# Patient Record
Sex: MALE | Race: BLACK OR AFRICAN AMERICAN | NOT HISPANIC OR LATINO | Employment: UNEMPLOYED | URBAN - METROPOLITAN AREA
[De-identification: names, ages, dates, MRNs, and addresses within clinical notes are randomized per-mention and may not be internally consistent; named-entity substitution may affect disease eponyms.]

---

## 2022-01-21 ENCOUNTER — HOSPITAL ENCOUNTER (INPATIENT)
Facility: CLINIC | Age: 32
LOS: 1 days | Discharge: HOME | End: 2022-01-23
Attending: EMERGENCY MEDICINE | Admitting: INTERNAL MEDICINE
Payer: COMMERCIAL

## 2022-01-21 DIAGNOSIS — U07.1 LAB TEST POSITIVE FOR DETECTION OF COVID-19 VIRUS: ICD-10-CM

## 2022-01-21 DIAGNOSIS — I50.20 HEART FAILURE WITH REDUCED EJECTION FRACTION, NYHA CLASS I (H): ICD-10-CM

## 2022-01-21 LAB
ALBUMIN SERPL-MCNC: 2.6 G/DL (ref 3.4–5)
ALP SERPL-CCNC: 119 U/L (ref 40–150)
ALT SERPL W P-5'-P-CCNC: 60 U/L (ref 0–70)
ANION GAP SERPL CALCULATED.3IONS-SCNC: 8 MMOL/L (ref 3–14)
AST SERPL W P-5'-P-CCNC: 35 U/L (ref 0–45)
BASE EXCESS BLDV CALC-SCNC: 4.9 MMOL/L (ref -7.7–1.9)
BASOPHILS # BLD AUTO: 0.1 10E3/UL (ref 0–0.2)
BASOPHILS NFR BLD AUTO: 1 %
BILIRUB SERPL-MCNC: 0.9 MG/DL (ref 0.2–1.3)
BUN SERPL-MCNC: 23 MG/DL (ref 7–30)
CALCIUM SERPL-MCNC: 8.6 MG/DL (ref 8.5–10.1)
CHLORIDE BLD-SCNC: 103 MMOL/L (ref 94–109)
CO2 SERPL-SCNC: 29 MMOL/L (ref 20–32)
CREAT SERPL-MCNC: 1.67 MG/DL (ref 0.66–1.25)
EOSINOPHIL # BLD AUTO: 0 10E3/UL (ref 0–0.7)
EOSINOPHIL NFR BLD AUTO: 0 %
ERYTHROCYTE [DISTWIDTH] IN BLOOD BY AUTOMATED COUNT: 15.8 % (ref 10–15)
GFR SERPL CREATININE-BSD FRML MDRD: 56 ML/MIN/1.73M2
GLUCOSE BLD-MCNC: 109 MG/DL (ref 70–99)
HCO3 BLDV-SCNC: 30 MMOL/L (ref 21–28)
HCT VFR BLD AUTO: 39.6 % (ref 40–53)
HGB BLD-MCNC: 11.8 G/DL (ref 13.3–17.7)
IMM GRANULOCYTES # BLD: 0 10E3/UL
IMM GRANULOCYTES NFR BLD: 0 %
LACTATE SERPL-SCNC: 1.5 MMOL/L (ref 0.7–2)
LYMPHOCYTES # BLD AUTO: 2.8 10E3/UL (ref 0.8–5.3)
LYMPHOCYTES NFR BLD AUTO: 35 %
MCH RBC QN AUTO: 22.4 PG (ref 26.5–33)
MCHC RBC AUTO-ENTMCNC: 29.8 G/DL (ref 31.5–36.5)
MCV RBC AUTO: 75 FL (ref 78–100)
MONOCYTES # BLD AUTO: 1 10E3/UL (ref 0–1.3)
MONOCYTES NFR BLD AUTO: 13 %
NEUTROPHILS # BLD AUTO: 4 10E3/UL (ref 1.6–8.3)
NEUTROPHILS NFR BLD AUTO: 51 %
NRBC # BLD AUTO: 0 10E3/UL
NRBC BLD AUTO-RTO: 1 /100
NT-PROBNP SERPL-MCNC: 3984 PG/ML (ref 0–450)
O2/TOTAL GAS SETTING VFR VENT: 96 %
PCO2 BLDV: 45 MM HG (ref 40–50)
PH BLDV: 7.43 [PH] (ref 7.32–7.43)
PLATELET # BLD AUTO: 300 10E3/UL (ref 150–450)
PO2 BLDV: 60 MM HG (ref 25–47)
POTASSIUM BLD-SCNC: 3.4 MMOL/L (ref 3.4–5.3)
PROT SERPL-MCNC: 6.8 G/DL (ref 6.8–8.8)
RBC # BLD AUTO: 5.26 10E6/UL (ref 4.4–5.9)
SODIUM SERPL-SCNC: 140 MMOL/L (ref 133–144)
TROPONIN I SERPL HS-MCNC: 141 NG/L
TSH SERPL DL<=0.005 MIU/L-ACNC: 1.71 MU/L (ref 0.4–4)
WBC # BLD AUTO: 7.9 10E3/UL (ref 4–11)

## 2022-01-21 PROCEDURE — 83880 ASSAY OF NATRIURETIC PEPTIDE: CPT | Performed by: STUDENT IN AN ORGANIZED HEALTH CARE EDUCATION/TRAINING PROGRAM

## 2022-01-21 PROCEDURE — C9803 HOPD COVID-19 SPEC COLLECT: HCPCS

## 2022-01-21 PROCEDURE — 85025 COMPLETE CBC W/AUTO DIFF WBC: CPT | Performed by: STUDENT IN AN ORGANIZED HEALTH CARE EDUCATION/TRAINING PROGRAM

## 2022-01-21 PROCEDURE — 96376 TX/PRO/DX INJ SAME DRUG ADON: CPT

## 2022-01-21 PROCEDURE — 99292 CRITICAL CARE ADDL 30 MIN: CPT

## 2022-01-21 PROCEDURE — 99291 CRITICAL CARE FIRST HOUR: CPT | Mod: 25 | Performed by: STUDENT IN AN ORGANIZED HEALTH CARE EDUCATION/TRAINING PROGRAM

## 2022-01-21 PROCEDURE — 87636 SARSCOV2 & INF A&B AMP PRB: CPT | Performed by: STUDENT IN AN ORGANIZED HEALTH CARE EDUCATION/TRAINING PROGRAM

## 2022-01-21 PROCEDURE — 258N000003 HC RX IP 258 OP 636: Performed by: STUDENT IN AN ORGANIZED HEALTH CARE EDUCATION/TRAINING PROGRAM

## 2022-01-21 PROCEDURE — 82803 BLOOD GASES ANY COMBINATION: CPT | Performed by: STUDENT IN AN ORGANIZED HEALTH CARE EDUCATION/TRAINING PROGRAM

## 2022-01-21 PROCEDURE — 84484 ASSAY OF TROPONIN QUANT: CPT | Performed by: STUDENT IN AN ORGANIZED HEALTH CARE EDUCATION/TRAINING PROGRAM

## 2022-01-21 PROCEDURE — 96366 THER/PROPH/DIAG IV INF ADDON: CPT

## 2022-01-21 PROCEDURE — 36415 COLL VENOUS BLD VENIPUNCTURE: CPT | Performed by: STUDENT IN AN ORGANIZED HEALTH CARE EDUCATION/TRAINING PROGRAM

## 2022-01-21 PROCEDURE — 93010 ELECTROCARDIOGRAM REPORT: CPT | Performed by: STUDENT IN AN ORGANIZED HEALTH CARE EDUCATION/TRAINING PROGRAM

## 2022-01-21 PROCEDURE — 250N000011 HC RX IP 250 OP 636: Performed by: STUDENT IN AN ORGANIZED HEALTH CARE EDUCATION/TRAINING PROGRAM

## 2022-01-21 PROCEDURE — 250N000013 HC RX MED GY IP 250 OP 250 PS 637: Performed by: STUDENT IN AN ORGANIZED HEALTH CARE EDUCATION/TRAINING PROGRAM

## 2022-01-21 PROCEDURE — 96368 THER/DIAG CONCURRENT INF: CPT

## 2022-01-21 PROCEDURE — 93005 ELECTROCARDIOGRAM TRACING: CPT

## 2022-01-21 PROCEDURE — 83605 ASSAY OF LACTIC ACID: CPT | Performed by: STUDENT IN AN ORGANIZED HEALTH CARE EDUCATION/TRAINING PROGRAM

## 2022-01-21 PROCEDURE — 96375 TX/PRO/DX INJ NEW DRUG ADDON: CPT

## 2022-01-21 PROCEDURE — 96365 THER/PROPH/DIAG IV INF INIT: CPT

## 2022-01-21 PROCEDURE — 250N000009 HC RX 250: Performed by: STUDENT IN AN ORGANIZED HEALTH CARE EDUCATION/TRAINING PROGRAM

## 2022-01-21 PROCEDURE — 99291 CRITICAL CARE FIRST HOUR: CPT | Mod: 25

## 2022-01-21 PROCEDURE — 80053 COMPREHEN METABOLIC PANEL: CPT | Performed by: STUDENT IN AN ORGANIZED HEALTH CARE EDUCATION/TRAINING PROGRAM

## 2022-01-21 PROCEDURE — 99292 CRITICAL CARE ADDL 30 MIN: CPT | Mod: 25 | Performed by: STUDENT IN AN ORGANIZED HEALTH CARE EDUCATION/TRAINING PROGRAM

## 2022-01-21 PROCEDURE — 84443 ASSAY THYROID STIM HORMONE: CPT | Performed by: STUDENT IN AN ORGANIZED HEALTH CARE EDUCATION/TRAINING PROGRAM

## 2022-01-21 PROCEDURE — 250N000011 HC RX IP 250 OP 636

## 2022-01-21 RX ORDER — ADENOSINE 3 MG/ML
6 INJECTION, SOLUTION INTRAVENOUS ONCE
Status: COMPLETED | OUTPATIENT
Start: 2022-01-21 | End: 2022-01-21

## 2022-01-21 RX ORDER — FUROSEMIDE 10 MG/ML
40 INJECTION INTRAMUSCULAR; INTRAVENOUS ONCE
Status: COMPLETED | OUTPATIENT
Start: 2022-01-21 | End: 2022-01-21

## 2022-01-21 RX ORDER — DILTIAZEM HYDROCHLORIDE 5 MG/ML
10 INJECTION INTRAVENOUS ONCE
Status: COMPLETED | OUTPATIENT
Start: 2022-01-21 | End: 2022-01-21

## 2022-01-21 RX ORDER — DILTIAZEM HYDROCHLORIDE 5 MG/ML
15 INJECTION INTRAVENOUS ONCE
Status: COMPLETED | OUTPATIENT
Start: 2022-01-21 | End: 2022-01-21

## 2022-01-21 RX ORDER — NITROGLYCERIN 0.4 MG/1
0.4 TABLET SUBLINGUAL EVERY 5 MIN PRN
Status: DISCONTINUED | OUTPATIENT
Start: 2022-01-21 | End: 2022-01-23 | Stop reason: HOSPADM

## 2022-01-21 RX ORDER — ADENOSINE 3 MG/ML
INJECTION, SOLUTION INTRAVENOUS
Status: COMPLETED
Start: 2022-01-21 | End: 2022-01-21

## 2022-01-21 RX ORDER — ASPIRIN 81 MG/1
324 TABLET, CHEWABLE ORAL ONCE
Status: COMPLETED | OUTPATIENT
Start: 2022-01-21 | End: 2022-01-21

## 2022-01-21 RX ORDER — NITROGLYCERIN 20 MG/100ML
10-200 INJECTION INTRAVENOUS CONTINUOUS
Status: DISCONTINUED | OUTPATIENT
Start: 2022-01-21 | End: 2022-01-23 | Stop reason: HOSPADM

## 2022-01-21 RX ORDER — ETOMIDATE 2 MG/ML
20 INJECTION INTRAVENOUS ONCE
Status: DISCONTINUED | OUTPATIENT
Start: 2022-01-21 | End: 2022-01-21

## 2022-01-21 RX ADMIN — ASPIRIN 81 MG CHEWABLE TABLET 324 MG: 81 TABLET CHEWABLE at 20:43

## 2022-01-21 RX ADMIN — ADENOSINE 6 MG: 3 INJECTION, SOLUTION INTRAVENOUS at 22:01

## 2022-01-21 RX ADMIN — ADENOSINE 12 MG: 3 INJECTION, SOLUTION INTRAVENOUS at 22:15

## 2022-01-21 RX ADMIN — DILTIAZEM HYDROCHLORIDE 15 MG: 5 INJECTION INTRAVENOUS at 23:31

## 2022-01-21 RX ADMIN — NITROGLYCERIN 10 MCG/MIN: 20 INJECTION INTRAVENOUS at 20:57

## 2022-01-21 RX ADMIN — DILTIAZEM HYDROCHLORIDE 10 MG: 5 INJECTION INTRAVENOUS at 22:58

## 2022-01-21 RX ADMIN — NITROGLYCERIN 0.4 MG: 0.4 TABLET SUBLINGUAL at 20:30

## 2022-01-21 RX ADMIN — FUROSEMIDE 40 MG: 10 INJECTION, SOLUTION INTRAVENOUS at 20:47

## 2022-01-21 RX ADMIN — FUROSEMIDE 40 MG: 10 INJECTION, SOLUTION INTRAVENOUS at 21:02

## 2022-01-21 RX ADMIN — Medication 2 MG/HR: at 21:28

## 2022-01-21 RX ADMIN — DILTIAZEM HYDROCHLORIDE 5 MG/HR: 5 INJECTION INTRAVENOUS at 22:59

## 2022-01-21 ASSESSMENT — ENCOUNTER SYMPTOMS
HEADACHES: 0
ARTHRALGIAS: 0
FEVER: 0
EYE REDNESS: 0
COLOR CHANGE: 0
SHORTNESS OF BREATH: 1
NECK STIFFNESS: 0
COUGH: 1
CHEST TIGHTNESS: 1
ABDOMINAL PAIN: 0
CONFUSION: 0
DIFFICULTY URINATING: 0

## 2022-01-21 NOTE — LETTER
[facility] 6D INTERMEDIATE CARE  [address] [address]  Phone: [phone]  Fax: [phone]    January 23, 2022        Miguel Ángel Wilson  [address] [place] [id number]          To whom it may concern:    RE: Miguel Ángel Wilson    Patient was hospitalized from 1/21-1/23/22 and under the care of the cardiology team.       Please contact me for questions or concerns.      Sincerely,        Marivel Willis PA-C  [facility]

## 2022-01-22 ENCOUNTER — APPOINTMENT (OUTPATIENT)
Dept: GENERAL RADIOLOGY | Facility: CLINIC | Age: 32
End: 2022-01-22
Attending: STUDENT IN AN ORGANIZED HEALTH CARE EDUCATION/TRAINING PROGRAM
Payer: COMMERCIAL

## 2022-01-22 ENCOUNTER — APPOINTMENT (OUTPATIENT)
Dept: CARDIOLOGY | Facility: CLINIC | Age: 32
End: 2022-01-22
Attending: NURSE PRACTITIONER
Payer: COMMERCIAL

## 2022-01-22 PROBLEM — I50.20 HEART FAILURE WITH REDUCED EJECTION FRACTION, NYHA CLASS I (H): Status: ACTIVE | Noted: 2022-01-22

## 2022-01-22 LAB
ANION GAP SERPL CALCULATED.3IONS-SCNC: 6 MMOL/L (ref 3–14)
ANION GAP SERPL CALCULATED.3IONS-SCNC: 8 MMOL/L (ref 3–14)
BUN SERPL-MCNC: 20 MG/DL (ref 7–30)
BUN SERPL-MCNC: 20 MG/DL (ref 7–30)
CALCIUM SERPL-MCNC: 8 MG/DL (ref 8.5–10.1)
CALCIUM SERPL-MCNC: 8.5 MG/DL (ref 8.5–10.1)
CHLORIDE BLD-SCNC: 94 MMOL/L (ref 94–109)
CHLORIDE BLD-SCNC: 96 MMOL/L (ref 94–109)
CHOLEST SERPL-MCNC: 112 MG/DL
CO2 SERPL-SCNC: 35 MMOL/L (ref 20–32)
CO2 SERPL-SCNC: 38 MMOL/L (ref 20–32)
CREAT SERPL-MCNC: 1.57 MG/DL (ref 0.66–1.25)
CREAT SERPL-MCNC: 1.64 MG/DL (ref 0.66–1.25)
CREAT SERPL-MCNC: 1.84 MG/DL (ref 0.66–1.25)
FLUAV RNA SPEC QL NAA+PROBE: NEGATIVE
FLUBV RNA RESP QL NAA+PROBE: NEGATIVE
GFR SERPL CREATININE-BSD FRML MDRD: 50 ML/MIN/1.73M2
GFR SERPL CREATININE-BSD FRML MDRD: 57 ML/MIN/1.73M2
GFR SERPL CREATININE-BSD FRML MDRD: 60 ML/MIN/1.73M2
GLUCOSE BLD-MCNC: 143 MG/DL (ref 70–99)
GLUCOSE BLD-MCNC: 159 MG/DL (ref 70–99)
HDLC SERPL-MCNC: 26 MG/DL
LDLC SERPL CALC-MCNC: 69 MG/DL
LVEF ECHO: NORMAL
MAGNESIUM SERPL-MCNC: 1.3 MG/DL (ref 1.6–2.3)
NONHDLC SERPL-MCNC: 86 MG/DL
POTASSIUM BLD-SCNC: 2.8 MMOL/L (ref 3.4–5.3)
POTASSIUM BLD-SCNC: 2.8 MMOL/L (ref 3.4–5.3)
POTASSIUM BLD-SCNC: 3 MMOL/L (ref 3.4–5.3)
SARS-COV-2 RNA RESP QL NAA+PROBE: POSITIVE
SODIUM SERPL-SCNC: 137 MMOL/L (ref 133–144)
SODIUM SERPL-SCNC: 140 MMOL/L (ref 133–144)
TRIGL SERPL-MCNC: 87 MG/DL
TROPONIN I SERPL HS-MCNC: 104 NG/L

## 2022-01-22 PROCEDURE — 258N000003 HC RX IP 258 OP 636: Performed by: STUDENT IN AN ORGANIZED HEALTH CARE EDUCATION/TRAINING PROGRAM

## 2022-01-22 PROCEDURE — 120N000002 HC R&B MED SURG/OB UMMC

## 2022-01-22 PROCEDURE — 93306 TTE W/DOPPLER COMPLETE: CPT | Mod: 26 | Performed by: INTERNAL MEDICINE

## 2022-01-22 PROCEDURE — 999N000208 ECHOCARDIOGRAM COMPLETE

## 2022-01-22 PROCEDURE — 250N000013 HC RX MED GY IP 250 OP 250 PS 637: Performed by: NURSE PRACTITIONER

## 2022-01-22 PROCEDURE — 71046 X-RAY EXAM CHEST 2 VIEWS: CPT

## 2022-01-22 PROCEDURE — 36415 COLL VENOUS BLD VENIPUNCTURE: CPT | Performed by: NURSE PRACTITIONER

## 2022-01-22 PROCEDURE — 84484 ASSAY OF TROPONIN QUANT: CPT | Performed by: STUDENT IN AN ORGANIZED HEALTH CARE EDUCATION/TRAINING PROGRAM

## 2022-01-22 PROCEDURE — 82565 ASSAY OF CREATININE: CPT | Performed by: NURSE PRACTITIONER

## 2022-01-22 PROCEDURE — 80061 LIPID PANEL: CPT | Performed by: NURSE PRACTITIONER

## 2022-01-22 PROCEDURE — 250N000009 HC RX 250: Performed by: STUDENT IN AN ORGANIZED HEALTH CARE EDUCATION/TRAINING PROGRAM

## 2022-01-22 PROCEDURE — 255N000002 HC RX 255 OP 636: Performed by: INTERNAL MEDICINE

## 2022-01-22 PROCEDURE — 250N000013 HC RX MED GY IP 250 OP 250 PS 637: Performed by: INTERNAL MEDICINE

## 2022-01-22 PROCEDURE — 36415 COLL VENOUS BLD VENIPUNCTURE: CPT | Performed by: STUDENT IN AN ORGANIZED HEALTH CARE EDUCATION/TRAINING PROGRAM

## 2022-01-22 PROCEDURE — 84132 ASSAY OF SERUM POTASSIUM: CPT | Performed by: NURSE PRACTITIONER

## 2022-01-22 PROCEDURE — 36415 COLL VENOUS BLD VENIPUNCTURE: CPT | Performed by: EMERGENCY MEDICINE

## 2022-01-22 PROCEDURE — 80048 BASIC METABOLIC PNL TOTAL CA: CPT | Performed by: EMERGENCY MEDICINE

## 2022-01-22 PROCEDURE — 250N000011 HC RX IP 250 OP 636: Performed by: NURSE PRACTITIONER

## 2022-01-22 PROCEDURE — 250N000011 HC RX IP 250 OP 636: Performed by: STUDENT IN AN ORGANIZED HEALTH CARE EDUCATION/TRAINING PROGRAM

## 2022-01-22 PROCEDURE — 83735 ASSAY OF MAGNESIUM: CPT | Performed by: NURSE PRACTITIONER

## 2022-01-22 PROCEDURE — 99223 1ST HOSP IP/OBS HIGH 75: CPT | Mod: 25 | Performed by: INTERNAL MEDICINE

## 2022-01-22 RX ORDER — LISINOPRIL 40 MG/1
40 TABLET ORAL DAILY
Status: DISCONTINUED | OUTPATIENT
Start: 2022-01-23 | End: 2022-01-23 | Stop reason: HOSPADM

## 2022-01-22 RX ORDER — MAGNESIUM SULFATE HEPTAHYDRATE 40 MG/ML
4 INJECTION, SOLUTION INTRAVENOUS ONCE
Status: COMPLETED | OUTPATIENT
Start: 2022-01-22 | End: 2022-01-22

## 2022-01-22 RX ORDER — LISINOPRIL 5 MG/1
20 TABLET ORAL ONCE
Status: COMPLETED | OUTPATIENT
Start: 2022-01-22 | End: 2022-01-22

## 2022-01-22 RX ORDER — LIDOCAINE 40 MG/G
CREAM TOPICAL
Status: DISCONTINUED | OUTPATIENT
Start: 2022-01-22 | End: 2022-01-23 | Stop reason: HOSPADM

## 2022-01-22 RX ORDER — ONDANSETRON 2 MG/ML
4 INJECTION INTRAMUSCULAR; INTRAVENOUS EVERY 6 HOURS PRN
Status: DISCONTINUED | OUTPATIENT
Start: 2022-01-22 | End: 2022-01-23 | Stop reason: HOSPADM

## 2022-01-22 RX ORDER — POTASSIUM CHLORIDE 750 MG/1
40 TABLET, EXTENDED RELEASE ORAL ONCE
Status: COMPLETED | OUTPATIENT
Start: 2022-01-22 | End: 2022-01-22

## 2022-01-22 RX ORDER — ACETAMINOPHEN 650 MG/1
650 SUPPOSITORY RECTAL EVERY 4 HOURS PRN
Status: DISCONTINUED | OUTPATIENT
Start: 2022-01-22 | End: 2022-01-23 | Stop reason: HOSPADM

## 2022-01-22 RX ORDER — ACETAMINOPHEN 325 MG/1
650 TABLET ORAL EVERY 4 HOURS PRN
Status: DISCONTINUED | OUTPATIENT
Start: 2022-01-22 | End: 2022-01-23 | Stop reason: HOSPADM

## 2022-01-22 RX ORDER — METOPROLOL SUCCINATE 25 MG/1
25 TABLET, EXTENDED RELEASE ORAL DAILY
Status: DISCONTINUED | OUTPATIENT
Start: 2022-01-22 | End: 2022-01-23 | Stop reason: HOSPADM

## 2022-01-22 RX ORDER — AMOXICILLIN 250 MG
1 CAPSULE ORAL 2 TIMES DAILY PRN
Status: DISCONTINUED | OUTPATIENT
Start: 2022-01-22 | End: 2022-01-23 | Stop reason: HOSPADM

## 2022-01-22 RX ORDER — CARVEDILOL 3.12 MG/1
6.25 TABLET ORAL 2 TIMES DAILY WITH MEALS
Status: DISCONTINUED | OUTPATIENT
Start: 2022-01-22 | End: 2022-01-22

## 2022-01-22 RX ORDER — MAGNESIUM HYDROXIDE/ALUMINUM HYDROXICE/SIMETHICONE 120; 1200; 1200 MG/30ML; MG/30ML; MG/30ML
30 SUSPENSION ORAL EVERY 4 HOURS PRN
Status: DISCONTINUED | OUTPATIENT
Start: 2022-01-22 | End: 2022-01-23 | Stop reason: HOSPADM

## 2022-01-22 RX ORDER — POTASSIUM CHLORIDE 750 MG/1
20 TABLET, EXTENDED RELEASE ORAL ONCE
Status: COMPLETED | OUTPATIENT
Start: 2022-01-22 | End: 2022-01-22

## 2022-01-22 RX ORDER — PANTOPRAZOLE SODIUM 40 MG/1
40 TABLET, DELAYED RELEASE ORAL
Status: DISCONTINUED | OUTPATIENT
Start: 2022-01-23 | End: 2022-01-23 | Stop reason: HOSPADM

## 2022-01-22 RX ORDER — ONDANSETRON 4 MG/1
4 TABLET, ORALLY DISINTEGRATING ORAL EVERY 6 HOURS PRN
Status: DISCONTINUED | OUTPATIENT
Start: 2022-01-22 | End: 2022-01-23 | Stop reason: HOSPADM

## 2022-01-22 RX ORDER — LISINOPRIL 5 MG/1
20 TABLET ORAL DAILY
Status: DISCONTINUED | OUTPATIENT
Start: 2022-01-22 | End: 2022-01-22

## 2022-01-22 RX ORDER — NITROGLYCERIN 0.4 MG/1
0.4 TABLET SUBLINGUAL EVERY 5 MIN PRN
Status: DISCONTINUED | OUTPATIENT
Start: 2022-01-22 | End: 2022-01-22

## 2022-01-22 RX ORDER — PROCHLORPERAZINE MALEATE 10 MG
10 TABLET ORAL EVERY 6 HOURS PRN
Status: DISCONTINUED | OUTPATIENT
Start: 2022-01-22 | End: 2022-01-23 | Stop reason: HOSPADM

## 2022-01-22 RX ORDER — POTASSIUM CHLORIDE 7.45 MG/ML
10 INJECTION INTRAVENOUS
Status: DISCONTINUED | OUTPATIENT
Start: 2022-01-22 | End: 2022-01-22

## 2022-01-22 RX ORDER — AMOXICILLIN 250 MG
2 CAPSULE ORAL 2 TIMES DAILY PRN
Status: DISCONTINUED | OUTPATIENT
Start: 2022-01-22 | End: 2022-01-23 | Stop reason: HOSPADM

## 2022-01-22 RX ORDER — PROCHLORPERAZINE 25 MG
25 SUPPOSITORY, RECTAL RECTAL EVERY 12 HOURS PRN
Status: DISCONTINUED | OUTPATIENT
Start: 2022-01-22 | End: 2022-01-23 | Stop reason: HOSPADM

## 2022-01-22 RX ORDER — LIDOCAINE 4 G/G
1 PATCH TOPICAL
Status: DISCONTINUED | OUTPATIENT
Start: 2022-01-22 | End: 2022-01-23 | Stop reason: HOSPADM

## 2022-01-22 RX ADMIN — POTASSIUM CHLORIDE 40 MEQ: 750 TABLET, EXTENDED RELEASE ORAL at 10:29

## 2022-01-22 RX ADMIN — POTASSIUM CHLORIDE 40 MEQ: 750 TABLET, EXTENDED RELEASE ORAL at 18:33

## 2022-01-22 RX ADMIN — APIXABAN 5 MG: 5 TABLET, FILM COATED ORAL at 20:35

## 2022-01-22 RX ADMIN — MAGNESIUM SULFATE IN WATER 4 G: 40 INJECTION, SOLUTION INTRAVENOUS at 15:31

## 2022-01-22 RX ADMIN — Medication 2 MG/HR: at 06:44

## 2022-01-22 RX ADMIN — ENOXAPARIN SODIUM 40 MG: 40 INJECTION SUBCUTANEOUS at 10:29

## 2022-01-22 RX ADMIN — POTASSIUM CHLORIDE 20 MEQ: 750 TABLET, EXTENDED RELEASE ORAL at 11:54

## 2022-01-22 RX ADMIN — LISINOPRIL 20 MG: 5 TABLET ORAL at 10:29

## 2022-01-22 RX ADMIN — METOPROLOL SUCCINATE 25 MG: 25 TABLET, FILM COATED, EXTENDED RELEASE ORAL at 11:55

## 2022-01-22 RX ADMIN — ACETAMINOPHEN 650 MG: 325 TABLET, FILM COATED ORAL at 11:15

## 2022-01-22 RX ADMIN — CARVEDILOL 6.25 MG: 3.12 TABLET, FILM COATED ORAL at 10:29

## 2022-01-22 RX ADMIN — HUMAN ALBUMIN MICROSPHERES AND PERFLUTREN 5 ML: 10; .22 INJECTION, SOLUTION INTRAVENOUS at 10:40

## 2022-01-22 RX ADMIN — NITROGLYCERIN 130 MCG/MIN: 20 INJECTION INTRAVENOUS at 06:44

## 2022-01-22 RX ADMIN — LISINOPRIL 20 MG: 5 TABLET ORAL at 11:55

## 2022-01-22 RX ADMIN — ACETAMINOPHEN 650 MG: 325 TABLET, FILM COATED ORAL at 20:46

## 2022-01-22 RX ADMIN — DILTIAZEM HYDROCHLORIDE 15 MG/HR: 5 INJECTION INTRAVENOUS at 07:47

## 2022-01-22 RX ADMIN — POTASSIUM CHLORIDE 20 MEQ: 750 TABLET, EXTENDED RELEASE ORAL at 20:35

## 2022-01-22 ASSESSMENT — ENCOUNTER SYMPTOMS
NAUSEA: 0
CHILLS: 0
BACK PAIN: 0
HEMATURIA: 0
NECK PAIN: 0
SORE THROAT: 0
RHINORRHEA: 0
ABDOMINAL DISTENTION: 0
WOUND: 0
EYE PAIN: 0
DIZZINESS: 0
WHEEZING: 0
DIARRHEA: 0
NUMBNESS: 0
VOMITING: 0
EYE DISCHARGE: 0
WEAKNESS: 0

## 2022-01-22 ASSESSMENT — ACTIVITIES OF DAILY LIVING (ADL)
ADLS_ACUITY_SCORE: 7
ADLS_ACUITY_SCORE: 6
ADLS_ACUITY_SCORE: 6
DIFFICULTY_COMMUNICATING: NO
ADLS_ACUITY_SCORE: 6
ADLS_ACUITY_SCORE: 7
DIFFICULTY_EATING/SWALLOWING: NO
ADLS_ACUITY_SCORE: 7
ADLS_ACUITY_SCORE: 7
ADLS_ACUITY_SCORE: 6
ADLS_ACUITY_SCORE: 7
ADLS_ACUITY_SCORE: 7
ADLS_ACUITY_SCORE: 6
ADLS_ACUITY_SCORE: 5
ADLS_ACUITY_SCORE: 6
ADLS_ACUITY_SCORE: 7
ADLS_ACUITY_SCORE: 7
CONCENTRATING,_REMEMBERING_OR_MAKING_DECISIONS_DIFFICULTY: NO
TOILETING_ISSUES: NO
ADLS_ACUITY_SCORE: 6
ADLS_ACUITY_SCORE: 6
ADLS_ACUITY_SCORE: 5
WALKING_OR_CLIMBING_STAIRS_DIFFICULTY: NO
FALL_HISTORY_WITHIN_LAST_SIX_MONTHS: NO
DOING_ERRANDS_INDEPENDENTLY_DIFFICULTY: NO
DRESSING/BATHING_DIFFICULTY: NO
WEAR_GLASSES_OR_BLIND: NO
ADLS_ACUITY_SCORE: 6
ADLS_ACUITY_SCORE: 7

## 2022-01-22 ASSESSMENT — MIFFLIN-ST. JEOR: SCORE: 2480.25

## 2022-01-22 NOTE — ED PROVIDER NOTES
[facility] [place], [place] EMERGENCY DEPARTMENT ([facility])       1/21/22  History     Chief Complaint   Patient presents with     Hypertension     Patient states that he is swollen and his water pills arent working      The history is provided by the patient and medical records.     Miguel Ángel Wilson is a 31 year old male with a past medical history significant for CHF (11/28/21), medication non-adherence, and hypertension who presents to the Emergency Department for evaluation of diffuse body swelling and shortness of breath.    Patient reports that he has history of medication nonadherence.  He states that he has been experiencing diffuse body swelling along with left-sided chest pain.  He describes his chest pain as a pressure-like sensation that worsens with coughing.  He endorses that his most bothersome symptom today is shortness of breath.    Per [facility] records, patient is not vaccinated for COVID.     Per chart review, patient was admitted to [facility] from 11/27/2021 to 11/30/2021 for evaluation of congestive heart failure.  Patient reported to the ED for evaluation of shortness of breath and bilateral leg swelling edema for the past 2 to 3 months in the setting of medication nonadherence and was found to have new onset HFrEF. TTE with mild LV dysfunction, signficicant concentic LVH, mildly reduced EF. Had Acute Kidney Injury on admission, in the setting of HTN and HTNsive kidney disease, raised the concern for secondary HTN. Patient elected to leave and follow pu as outpatient for afterload reduction, good blood pressure control, and evaluation for secondary causes of hypertension.  Patient was prescribed increased dosage of carvedilol to 12.5 mg twice daily.  Patient was also prescribed lisinopril 20 mg daily. Additionally, patient was prescribed Lasix 40 mg on 01/12/22.     Past Medical History:   Diagnosis Date     Hypertension        No past surgical history on file.    No family history on file.    Social History      Tobacco Use     Smoking status: Current Some Day Smoker     Smokeless tobacco: Not on file   Substance Use Topics     Alcohol use: No       Current Facility-Administered Medications   Medication     bumetanide (BUMEX) 0.25 mg/mL infusion     diltiazem (CARDIZEM) 125 mg in sodium chloride 0.9 % 125 mL infusion     nitroGLYcerin (NITROSTAT) sublingual tablet 0.4 mg     nitroGLYcerin 50 mg in D5W 250 mL (adult std) infusion CENTRAL     Current Outpatient Medications   Medication     gabapentin (NEURONTIN) 300 MG capsule     lisinopril (PRINIVIL,ZESTRIL) 20 MG tablet     oxyCODONE (ROXICODONE) 5 MG immediate release tablet      No Known Allergies     I have reviewed the Medications, Allergies, Past Medical and Surgical History, and Social History in the Epic system.    Review of Systems   Constitutional: Negative for chills and fever.   HENT: Negative for congestion, rhinorrhea and sore throat.    Eyes: Negative for pain, discharge and redness.   Respiratory: Positive for cough, chest tightness (pressure like sensation) and shortness of breath. Negative for wheezing.    Cardiovascular: Positive for chest pain (left sided ) and leg swelling.        Positive for diffuse body swelling    Gastrointestinal: Negative for abdominal distention, abdominal pain, diarrhea, nausea and vomiting.   Genitourinary: Negative for difficulty urinating, hematuria and urgency.   Musculoskeletal: Negative for arthralgias, back pain, neck pain and neck stiffness.   Skin: Negative for color change, rash and wound.   Neurological: Negative for dizziness, weakness, numbness and headaches.   Psychiatric/Behavioral: Negative for confusion.   All other systems reviewed and are negative.    A complete review of systems was performed with pertinent positives and negatives noted in the HPI, and all other systems negative.    Physical Exam   BP: (!) 198/156  Pulse: (!) 168  Temp: 98.2  F (36.8  C)  Resp: 22  Weight: (!) 157.9 kg (348 lb)  SpO2: 100  %      Physical Exam  Constitutional:       General: He is awake. He is not in acute distress.     Appearance: Normal appearance.   HENT:      Head: Normocephalic and atraumatic.      Nose: Nose normal.      Mouth/Throat:      Mouth: Mucous membranes are dry.      Pharynx: Oropharynx is clear.   Eyes:      General: Lids are normal.      Extraocular Movements: Extraocular movements intact.      Conjunctiva/sclera: Conjunctivae normal.      Pupils: Pupils are equal, round, and reactive to light.   Cardiovascular:      Rate and Rhythm: Regular rhythm. Tachycardia present.      Pulses: Normal pulses.      Heart sounds: Normal heart sounds. No murmur heard.  No friction rub. No gallop.       Comments: Pitting edema extends to the nipples  Pulmonary:      Effort: Pulmonary effort is normal. Tachypnea present. No respiratory distress.      Breath sounds: No stridor. Examination of the right-upper field reveals decreased breath sounds and rales. Examination of the left-upper field reveals decreased breath sounds and rales. Examination of the right-middle field reveals decreased breath sounds and rales. Examination of the left-middle field reveals decreased breath sounds and rales. Examination of the right-lower field reveals decreased breath sounds and rales. Examination of the left-lower field reveals decreased breath sounds and rales. Decreased breath sounds and rales present. No wheezing or rhonchi.   Abdominal:      General: Abdomen is flat.      Palpations: Abdomen is soft.   Musculoskeletal:         General: Normal range of motion.      Cervical back: Full passive range of motion without pain, normal range of motion and neck supple.      Right lower leg: 3+ Pitting Edema present.      Left lower leg: 3+ Pitting Edema present.   Skin:     General: Skin is warm and dry.      Capillary Refill: Capillary refill takes less than 2 seconds.   Neurological:      General: No focal deficit present.      Mental Status: He is  alert and oriented to person, place, and time. Mental status is at baseline.   Psychiatric:         Attention and Perception: Attention normal.         Mood and Affect: Mood normal.         Behavior: Behavior normal. Behavior is cooperative.         ED Course     At 8:12 PM the patient was seen and examined by Salvador Boswell MD in Room ED01.        Procedures            EKG Interpretation:      Interpreted by Salvador Boswell MD  Time reviewed: 20:34  Symptoms at time of EKG: SOB and chest pressure   Rhythm: atrial flutter  Rate: >160  Axis: Normal  Ectopy: none  Conduction: normal  ST Segments/ T Waves: Non-specific ST-T wave changes  Q Waves: none  Comparison to prior: Significant changes from prior    Clinical Impression: atrial flutter (new onset)    Critical Care Addendum    My initial assessment, based on my review of nursing observations, review of vital signs, focused history, physical exam, review of cardiac rhythm monitor, 12 lead ECG analysis, discussion with cardiology and interpretation of rhythm strip, labs, and images , established that Miguel Ángel Wilson has respiratory insufficiency and severe tachycardia, which requires immediate intervention, and therefore he is critically ill.     After the initial assessment, the care team initiated multiple lab tests, initiated IV fluid administration, initiated medication therapy with lasix, bumex, nitroglycerin, diltiazem and consulted with cardiology to provide stabilization care. Due to the critical nature of this patient, I reassessed nursing observations, vital signs, physical exam, review of cardiac rhythm monitor, 12 lead ECG analysis, interpretation of labs and images, mental status, neurologic status and respiratory status multiple times prior to his disposition.     Time also spent performing documentation, reviewing test results, discussion with consultants and coordination of care.     Critical care time (excluding teaching time and procedures):  75 minutes.        Results for orders placed or performed during the hospital encounter of 01/21/22 (from the past 24 hour(s))   EKG 12-lead, tracing only   Result Value Ref Range    Systolic Blood Pressure  mmHg    Diastolic Blood Pressure  mmHg    Ventricular Rate 169 BPM    Atrial Rate 169 BPM    DC Interval 136 ms    QRS Duration 94 ms     ms    QTc 412 ms    P Axis 21 degrees    R AXIS 27 degrees    T Axis 254 degrees    Interpretation ECG       Sinus tachycardia  ST & T wave abnormality, consider inferior ischemia  Abnormal ECG     CBC with platelets differential    Narrative    The following orders were created for panel order CBC with platelets differential.  Procedure                               Abnormality         Status                     ---------                               -----------         ------                     CBC with platelets and d...[[id number]]  Abnormal            Final result                 Please view results for these tests on the individual orders.   Comprehensive metabolic panel   Result Value Ref Range    Sodium 140 133 - 144 mmol/L    Potassium 3.4 3.4 - 5.3 mmol/L    Chloride 103 94 - 109 mmol/L    Carbon Dioxide (CO2) 29 20 - 32 mmol/L    Anion Gap 8 3 - 14 mmol/L    Urea Nitrogen 23 7 - 30 mg/dL    Creatinine 1.67 (H) 0.66 - 1.25 mg/dL    Calcium 8.6 8.5 - 10.1 mg/dL    Glucose 109 (H) 70 - 99 mg/dL    Alkaline Phosphatase 119 40 - 150 U/L    AST 35 0 - 45 U/L    ALT 60 0 - 70 U/L    Protein Total 6.8 6.8 - 8.8 g/dL    Albumin 2.6 (L) 3.4 - 5.0 g/dL    Bilirubin Total 0.9 0.2 - 1.3 mg/dL    GFR Estimate 56 (L) >60 mL/min/1.73m2   Lactic acid whole blood   Result Value Ref Range    Lactic Acid 1.5 0.7 - 2.0 mmol/L   Troponin I   Result Value Ref Range    Troponin I High Sensitivity 141 (HH) <79 ng/L   TSH with free T4 reflex   Result Value Ref Range    TSH 1.71 0.40 - 4.00 mU/L   BNP   Result Value Ref Range    N terminal Pro BNP Inpatient 3,984 (H) 0 - 450 pg/mL    Blood gas venous   Result Value Ref Range    pH Venous 7.43 7.32 - 7.43    pCO2 Venous 45 40 - 50 mm Hg    pO2 Venous 60 (H) 25 - 47 mm Hg    Bicarbonate Venous 30 (H) 21 - 28 mmol/L    Base Excess/Deficit (+/-) 4.9 (H) -7.7 - 1.9 mmol/L    FIO2 96    CBC with platelets and differential   Result Value Ref Range    WBC Count 7.9 4.0 - 11.0 10e3/uL    RBC Count 5.26 4.40 - 5.90 10e6/uL    Hemoglobin 11.8 (L) 13.3 - 17.7 g/dL    Hematocrit 39.6 (L) 40.0 - 53.0 %    MCV 75 (L) 78 - 100 fL    MCH 22.4 (L) 26.5 - 33.0 pg    MCHC 29.8 (L) 31.5 - 36.5 g/dL    RDW 15.8 (H) 10.0 - 15.0 %    Platelet Count 300 150 - 450 10e3/uL    % Neutrophils 51 %    % Lymphocytes 35 %    % Monocytes 13 %    % Eosinophils 0 %    % Basophils 1 %    % Immature Granulocytes 0 %    NRBCs per 100 WBC 1 (H) <1 /100    Absolute Neutrophils 4.0 1.6 - 8.3 10e3/uL    Absolute Lymphocytes 2.8 0.8 - 5.3 10e3/uL    Absolute Monocytes 1.0 0.0 - 1.3 10e3/uL    Absolute Eosinophils 0.0 0.0 - 0.7 10e3/uL    Absolute Basophils 0.1 0.0 - 0.2 10e3/uL    Absolute Immature Granulocytes 0.0 <=0.4 10e3/uL    Absolute NRBCs 0.0 10e3/uL   Symptomatic; Unknown Influenza A/B & SARS-CoV2 (COVID-19) Virus PCR Multiplex Nasopharyngeal    Specimen: Nasopharyngeal; Swab   Result Value Ref Range    Influenza A PCR Negative Negative    Influenza B PCR Negative Negative    SARS CoV2 PCR Positive (A) Negative, Testing sent to reference lab. Results will be returned via unsolicited result    Narrative    Testing was performed using the marlyn SARS-CoV-2 & Influenza A/B Assay on the marlyn Yoon System. This test should be ordered for the detection of SARS-CoV-2 and influenza viruses in individuals who meet clinical and/or epidemiological criteria. Test performance is unknown in asymptomatic patients. This test is for in vitro diagnostic use under the FDA EUA for laboratories certified under CLIA to perform moderate and/or high complexity testing. This test has not been FDA  cleared or approved. A negative result does not rule out the presence of PCR inhibitors in the specimen or target RNA in concentration below the limit of detection for the assay. If only one viral target is positive but coinfection with multiple targets is suspected, the sample should be re-tested with another FDA cleared, approved or authorized test, if coinfection would change clinical management. [facility] are certified under the Clinical Laboratory Improvement Amendments of 1988 (CLIA-88) as  qualified to perform moderate and/or high complexity laboratory testing.     Medications   nitroGLYcerin (NITROSTAT) sublingual tablet 0.4 mg (0.4 mg Sublingual Given 1/21/22 2030)   nitroGLYcerin 50 mg in D5W 250 mL (adult std) infusion CENTRAL (130 mcg/min Intravenous Rate/Dose Change 1/22/22 0100)   bumetanide (BUMEX) 0.25 mg/mL infusion (2 mg/hr Intravenous Rate/Dose Verify 1/22/22 0100)   diltiazem (CARDIZEM) 125 mg in sodium chloride 0.9 % 125 mL infusion (15 mg/hr Intravenous Rate/Dose Verify 1/22/22 0100)   furosemide (LASIX) injection 40 mg (40 mg Intravenous Given 1/21/22 2047)   aspirin (ASA) chewable tablet 324 mg (324 mg Oral Given 1/21/22 2043)   furosemide (LASIX) injection 40 mg (40 mg Intravenous Given 1/21/22 2102)   adenosine (ADENOCARD) injection 6 mg (6 mg Intravenous Given 1/21/22 2201)   adenosine (ADENOCARD) 6 MG/2ML injection (12 mg  Given 1/21/22 2215)   adenosine (ADENOCARD) 6 MG/2ML injection (12 mg  Given 1/21/22 2215)   diltiazem (CARDIZEM) injection 10 mg (10 mg Intravenous Given 1/21/22 2258)   diltiazem (CARDIZEM) injection 15 mg (15 mg Intravenous Given 1/21/22 2331)           Assessments & Plan (with Medical Decision Making)   This is a 31 year old male who presents to the ED for evaluation of shortness of breath. Given the patient's history and examination, my suspicion is that the shortness of breath is likely due to acute heart failure exacerbation. While less  likely, other possible etiologies include ACS, pulmonary embolism, non-cardiac pulmonary edema, CHF, pneumonia, pneumothorax, pleural effusion. Evaluation and management will include CBC, CMP, troponin, VBG, lactate EKG, chest x-ray, oxygen monitoring and treatment with supplemental oxygen as needed, albuterol/ipratropium, and steroids as well as antibiotics pending identification of pneumonia or absence thereof. Will re-evaluate following these interventions and results to determine disposition.     Patient had significantly elevated blood pressure, given nitroglycerin on arrival, which did result in mild improvement, immediately initiated nitroglycerin drip. Bedside ultrasound showed reduced ejection fraction, consistent with patient's prior cardiac ultrasound showing ejection fraction of 47%. Patient requiring diuresis, 80 mg of Lasix given while awaiting Bumex to be delivered from pharmacy, patient started on a drip of 2 mg/h. Attempted to further evaluate patient's rhythm with adenosine, was able to slow rate enough to determine atrial flutter, given unclear duration of the atrial flutter, started on diltiazem to achieve rate control.    Blood work shows mild anemia, no transfusion indicated, patient does have an DORY, unclear if troponin is secondary to DORY as well as slight demand ischemia given his significant tachycardia, no acute ischemic changes noted on EKG. BNP is elevated consistent with heart failure which is noted on clinical exam, no noted acidosis, lactate is within normal limits.    Continue to monitor patient, symptoms markedly improved with initiation of diuresis, as well as nitroglycerin and diltiazem.    Spoke with cardiology, patient will require inpatient admission for further monitoring, aggressive diuresis, and ultimately developing a better outpatient regimen.    Patient continues to await admission, signed out to Dr. Webster for further care at the conclusion of my shift.    I have  reviewed the nursing notes.    I have reviewed the findings, diagnosis, plan and need for follow up with the patient.    New Prescriptions    No medications on file       Final diagnoses:   Heart failure with reduced ejection fraction, NYHA class I (H)       I, Christina De Leon, am serving as a trained medical scribe to document services personally performed by Salvador Boswell MD, based on the provider's statements to me.      I,Salvador Boswell MD, was physically present and have reviewed and verified the accuracy of this note documented by Christina Boswell MD  1/21/2022   [facility] EMERGENCY DEPARTMENT     Salvador Boswell MD  01/22/22 0157

## 2022-01-22 NOTE — ED NOTES
[facility]   ED Nurse to Floor Handoff     Miguel Ángel Wilson is a 31 year old male who speaks English and lives with family members,  in a home  They arrived in the ED by car from home    ED Chief Complaint: Hypertension (Patient states that he is swollen and his water pills arent working )    ED Dx;   Final diagnoses:   Heart failure with reduced ejection fraction, NYHA class I (H)         Needed?: No    Allergies: No Known Allergies.  Past Medical Hx:   Past Medical History:   Diagnosis Date     Hypertension       Baseline Mental status: WDL  Current Mental Status changes: at basesline    Infection present or suspected this encounter: yes COVID r/o and special precautions  Sepsis suspected: No  Isolation type: Special Precautions-COVID-19  Patient tested for COVID 19 prior to admission: YES     Activity level - Baseline/Home:  Independent  Activity Level - Current:   Stand with Assist    Bariatric equipment needed?: Yes    In the ED these meds were given:   Medications   nitroGLYcerin (NITROSTAT) sublingual tablet 0.4 mg (0.4 mg Sublingual Given 1/21/22 2030)   nitroGLYcerin 50 mg in D5W 250 mL (adult std) infusion CENTRAL (130 mcg/min Intravenous Rate/Dose Verify 1/22/22 0700)   bumetanide (BUMEX) 0.25 mg/mL infusion (2 mg/hr Intravenous Rate/Dose Verify 1/22/22 0700)   diltiazem (CARDIZEM) 125 mg in sodium chloride 0.9 % 125 mL infusion (15 mg/hr Intravenous Rate/Dose Verify 1/22/22 0700)   furosemide (LASIX) injection 40 mg (40 mg Intravenous Given 1/21/22 2047)   aspirin (ASA) chewable tablet 324 mg (324 mg Oral Given 1/21/22 2043)   furosemide (LASIX) injection 40 mg (40 mg Intravenous Given 1/21/22 2102)   adenosine (ADENOCARD) injection 6 mg (6 mg Intravenous Given 1/21/22 2201)   adenosine (ADENOCARD) 6 MG/2ML injection (12 mg  Given 1/21/22 2215)   adenosine (ADENOCARD) 6 MG/2ML injection (12 mg  Given 1/21/22 2215)   diltiazem (CARDIZEM) injection 10 mg  (10 mg Intravenous Given 1/21/22 4189)   diltiazem (CARDIZEM) injection 15 mg (15 mg Intravenous Given 1/21/22 2331)       Drips running?  Yes    Home pump  No    Current LDAs  Peripheral IV 01/21/22 Anterior;Right Upper forearm (Active)   Number of days: 1       Peripheral IV 01/21/22 Left Lower forearm (Active)   Site Assessment WDL 01/21/22 2148   Line Status Saline locked 01/21/22 2148   Phlebitis Scale 0-->no symptoms 01/21/22 2148   Number of days: 1       Labs results:   Labs Ordered and Resulted from Time of ED Arrival to Time of ED Departure   COMPREHENSIVE METABOLIC PANEL - Abnormal       Result Value    Sodium 140      Potassium 3.4      Chloride 103      Carbon Dioxide (CO2) 29      Anion Gap 8      Urea Nitrogen 23      Creatinine 1.67 (*)     Calcium 8.6      Glucose 109 (*)     Alkaline Phosphatase 119      AST 35      ALT 60      Protein Total 6.8      Albumin 2.6 (*)     Bilirubin Total 0.9      GFR Estimate 56 (*)    TROPONIN I - Abnormal    Troponin I High Sensitivity 141 (*)    NT PROBNP INPATIENT - Abnormal    N terminal Pro BNP Inpatient 3,984 (*)    BLOOD GAS VENOUS - Abnormal    pH Venous 7.43      pCO2 Venous 45      pO2 Venous 60 (*)     Bicarbonate Venous 30 (*)     Base Excess/Deficit (+/-) 4.9 (*)     FIO2 96     CBC WITH PLATELETS AND DIFFERENTIAL - Abnormal    WBC Count 7.9      RBC Count 5.26      Hemoglobin 11.8 (*)     Hematocrit 39.6 (*)     MCV 75 (*)     MCH 22.4 (*)     MCHC 29.8 (*)     RDW 15.8 (*)     Platelet Count 300      % Neutrophils 51      % Lymphocytes 35      % Monocytes 13      % Eosinophils 0      % Basophils 1      % Immature Granulocytes 0      NRBCs per 100 WBC 1 (*)     Absolute Neutrophils 4.0      Absolute Lymphocytes 2.8      Absolute Monocytes 1.0      Absolute Eosinophils 0.0      Absolute Basophils 0.1      Absolute Immature Granulocytes 0.0      Absolute NRBCs 0.0     INFLUENZA A/B & SARS-COV2 PCR MULTIPLEX - Abnormal    Influenza A PCR Negative       Influenza B PCR Negative      SARS CoV2 PCR Positive (*)    TROPONIN I - Abnormal    Troponin I High Sensitivity 104 (*)    LACTIC ACID WHOLE BLOOD - Normal    Lactic Acid 1.5     TSH WITH FREE T4 REFLEX - Normal    TSH 1.71         Imaging Studies:   Recent Results (from the past 24 hour(s))   XR Chest 2 Views    Narrative    EXAM: XR CHEST 2 VW  LOCATION: [facility]  DATE/TIME: 1/22/2022 2:25 AM    INDICATION: Swelling and left chest pain.  COMPARISON: None.      Impression    IMPRESSION: Cardiac enlargement. No focal consolidation or pleural effusion.       Recent vital signs:   /80   Pulse 106   Temp 98.2  F (36.8  C) (Oral)   Resp 27   Wt (!) 157.9 kg (348 lb)   SpO2 96%             Cardiac Rhythm: Tachycardia  Pt needs tele? Yes  Skin/wound Issues: None    Code Status: Full Code    Pain control: pt had none    Nausea control: pt had none    Abnormal labs/tests/findings requiring intervention: See above    Family present during ED course? No   Family Comments/Social Situation comments:     Tasks needing completion: Continue monitor cardiac status and titrate drips    Prakash Desai RN  ascom --   [phone] [place] ED  [phone] [facility] ED

## 2022-01-22 NOTE — ED NOTES
Handoff report given to JACK Rivera.  Informed of course of ED stay and plan of care.  Miguel verbalized understanding.

## 2022-01-22 NOTE — H&P
[facility]     History and Physicial  Miguel Ángel Wilson MRN: [id number]  1990  Date of Admission:1/21/2022  Primary care provider: No Ref-Primary, Physician      Assessment and Plan:     Miguel Ángel Wilson is a pleasant 31 year old with past medical history of HTN, HFrEF (EF 45%), and hx tobacco use who presented to [place] ED with worsening SOB and lower extremity edema. He was transferred to [place] for ongoing management of decompensated heart failure.      #Acute on chronic HFrEF exacerbation (40-45%)   Recent [place] [facility] in 11/2021 for similar symptoms. TTE at that time showed LVEF 45% and LVH. Discharged on ACE and BB, lasix started by PCP in 12/21. He now presents with ongoing SOB since November with worsening edema. Initial EKG in ED showed Aflutter with . NTpBNP 6200. CXR nml. HS troponin 141 >104. Patient hypervolemic on exam with elevated JVP, EMILY, and abdominal distention. Admission weight 348 lbs, -288 lbs. Troponin elevation likely demand in the setting of decompensated heart failure and hypertension. Patient states he has been compliant with all medications except carvedilol as this makes him feel sluggish. He was placed on a bumex infusion in ED for diuresis, net negative 9L upon transfer. K 2.9, Mg 1.4. Unknown etiology of heart failure, differential dx includes uncontrolled HTN vs ischemic vs tachy-mediated. Denies any CP, dizziness, palpitations, PND, or orthopnea.   - Admit to telemetry   - Volume status : remains hypervolemic, although stop bumex infusion due to cramps and electrolyte abnormalities, likely resume IV diuresis tomorrow  - BB: stop PTA carvedilol due to side effects, start Toprol XL 25 mg daily   - ACE: increase lisinopril to 40 mg daily due to HTN  - Aldosterone antagonist: consider addition spironolactone pending BP   - SGLT2i: none, consider prior to discharge   - Strict I&O's, daily weights   - BID BMP   - Replace lytes per protocol   - 2G Na  diet   - Echo today   - Will need ischemic evaluation as outpatient   - Recommend OP sleep study     # Hypertensive emergency   Longstanding hx of hypertension as evidence by LVH on TTE. PTA carvedilol and lisinopril. Initial /156, now improved on nitroglycerin gtt.   - Increase PTA lisinopril, as above  - Start Toprol XL, as above  - Consider addition of spironolactone, as above  - Wean nitroglycerin gtt     #Atrial flutter  Initial EKG showed Aflutter with HR > 160, symptomatic. Initially place on diltiazem gtt in ED, stopped upon transfer. Unsure of chronicity. [id number] (CHF, HTN). Remains in Aflutter with -110's. Volume overload likely a possible trigger.   - AC: Start apixaban 5 mg BID   - IF patient remains in Aflutter tomorrow, will consult EP   - Possible AMIRA/DCCV on Monday, 1/24    #DORY on CKD   #Hypokalemia  #Hypomagnesemia   Cr 1.67. Upon chart review, this may be patients baseline. DORY likely 2/2 uncontrolled HTN vs congestion. K 2.9, Mg 1.3 likely 2/2 aggressive diuresis.   - Hold diuresis, as above  - Replace lytes per protocol   - BID BMP     # COVID positive   Unvaccinated COVID positive. CXR w/o signs of pneumonia. Stable on RA.   - Continue to monitor   - No indication to treat at this time     #Hx of tobacco use  Patient reports longstanding hx of tobacco use since age 15, 1 ppd. Recently quit in November of last year.   - NRT prn     FEN: Low sodium diet   Disposition: Admit to cards 1   Code Status: FULL CODE    Frieda Miguel DNP, APRN, CNP  [facility] Cardiology Team  [phone]           Chief Complaint:   SOB and edema          History of Present Illness:   Miguel Ángel Wilson is a 31 year old male with a past medical history significant for HFrEF (11/28/21), medication non-adherence, and hypertension who presents to the Emergency Department for evaluation of lower extremity edema and SOB.       Patient reports that he has history of medication nonadherence.  He states that he has  been experiencing diffuse body swelling along with left-sided chest pain.  He describes his chest pain as a pressure-like sensation that worsens with coughing.  He endorses that his most bothersome symptom today is shortness of breath.    In the ED, EKG shows Aflutter with HR > 160's. /156. NTpBNP 3900. Cr 1.67. K 2.8. HS troponin 141 > 104. COVID positive.            Review of Systems:    10 point review of systems negative except for stated above in HPI.          Past Medical History:   Medical History reviewed.   Past Medical History:   Diagnosis Date     Hypertension              Past Surgical History:   Surgical History reviewed.   No past surgical history on file.          Social History:   Social History reviewed.  Social History     Tobacco Use     Smoking status: Current Some Day Smoker     Smokeless tobacco: Not on file   Substance Use Topics     Alcohol use: No             Family History:   Family History reviewed.   No family history on file.          Allergies:   No Known Allergies          Medications:   Medications Reviewed.   Current Facility-Administered Medications   Medication     acetaminophen (TYLENOL) Suppository 650 mg     acetaminophen (TYLENOL) tablet 650 mg     alum & mag hydroxide-simethicone (MAALOX) suspension 30 mL     [Held by provider] bumetanide (BUMEX) 0.25 mg/mL infusion     enoxaparin ANTICOAGULANT (LOVENOX) injection 40 mg     lidocaine (LMX4) cream     lidocaine 1 % 0.1-1 mL     [START ON 1/23/2022] lisinopril (ZESTRIL) tablet 40 mg     medication instruction     metoprolol succinate ER (TOPROL-XL) 24 hr tablet 25 mg     nitroGLYcerin (NITROSTAT) sublingual tablet 0.4 mg     nitroGLYcerin 50 mg in D5W 250 mL (adult std) infusion CENTRAL     ondansetron (ZOFRAN-ODT) ODT tab 4 mg    Or     ondansetron (ZOFRAN) injection 4 mg     [START ON 1/23/2022] pantoprazole (PROTONIX) EC tablet 40 mg     prochlorperazine (COMPAZINE) injection 10 mg    Or     prochlorperazine  "(COMPAZINE) tablet 10 mg    Or     prochlorperazine (COMPAZINE) suppository 25 mg     senna-docusate (SENOKOT-S/PERICOLACE) 8.6-50 MG per tablet 1 tablet    Or     senna-docusate (SENOKOT-S/PERICOLACE) 8.6-50 MG per tablet 2 tablet     sodium chloride (PF) 0.9% PF flush 3 mL     sodium chloride (PF) 0.9% PF flush 3 mL             Physical Exam:   Vitals were reviewed.  Blood pressure 109/66, pulse 103, temperature 98.2  F (36.8  C), temperature source Oral, resp. rate (!) 32, height 1.87 m (6' 1.62\"), weight 145.7 kg (321 lb 1.6 oz), SpO2 90 %.    General: AAOx3, NAD  Skin: Not jaundiced, no rash, no ecchymoses  HEENT: MMM, PERRLA, EOM intact, elevated JVP   CV: Aflutter with HR > 100, normal S1S2, no murmur, clicks, rubs  Resp: Clear to auscultation bilaterally, no wheezes, rhonchi  Abd: distended, non-tender, BS+, no masses appreciated  Extremities: warm and well perfused, palpable pulses, 2-3+ EMILY  Neuro: No lateralizing symptoms or focal neurologic deficits        Labs:   Routine Labs:  No results found for: TROPI, TROPONIN, TROPR, TROPN  CMP  Recent Labs   Lab 01/22/22  0752 01/21/22 2045    140   POTASSIUM 2.8* 3.4   CHLORIDE 96 103   CO2 38* 29   ANIONGAP 6 8   * 109*   BUN 20 23   CR 1.64* 1.67*   GFRESTIMATED 57* 56*   EFREM 8.5 8.6   PROTTOTAL  --  6.8   ALBUMIN  --  2.6*   BILITOTAL  --  0.9   ALKPHOS  --  119   AST  --  35   ALT  --  60     CBC  Recent Labs   Lab 01/21/22 2045   WBC 7.9   RBC 5.26   HGB 11.8*   HCT 39.6*   MCV 75*   MCH 22.4*   MCHC 29.8*   RDW 15.8*        INRNo lab results found in last 7 days.        Diagnostics:    EKG 12Lead: 1/21/22      Echo: 11/2021 ([facility])  CONCLUSIONS     SUMMARY     Technically difficult imaging.   Decreased left ventricular systolic performance-mild.   The estimated left ventricular ejection fraction is 47 %.   No regional wall motion abnormality, probable.   Left ventricular hypertrophy, concentric-marked.   Normal right ventricular size " with borderline function.   No evidence for any significant structural valvular abnormalities.   The estimated PA systolic pressure is 34 mmHg + RA pressure.   Based on IVC geometry, the right atrial pressure is borderline high.   Pericardial effusion-small, posterior.     ADDITIONAL REMARKS     Doppler parameters are not diagnostic for elevation in LV filling   pressure. No prior study for comparison.

## 2022-01-22 NOTE — PROGRESS NOTES
Pt admitted to room 6515 at 0940. Pt tolerated transfer, drips taken over from EMS. nitroglycerin at 130, cardizem at 15, and bumex at 2. Pt oriented to room, connected to tele, spo2 monitor and cycling BP's every 15 minutes. No complaints of any chest pain, just cramping in lower legs.     See flowsheets.

## 2022-01-22 NOTE — ED NOTES
Sign out note: Miguel Ángel Wilson is a 31 year old male was signed out to me by Dr. Webster at 0700 am.  Please see initial dictation for full details and history.  Patient has history of congestive heart failure, medication noncompliance and hypertension who presented with diffuse body swelling and shortness of breath.  He was treated for CHF exacerbation and is currently on a Cardizem drip, nitro drip, Bumex drip.  He is COVID-positive.  EKG showed atrial flutter.  Plan at time of sign out is admit the patient to the cardiology service on the [facility]..       Course in the ED:  Review of laboratory studies show that the patient is COVID-positive.  Initial troponin was 141.  Repeat at 3 AM was 104.  BNP is elevated at 3984.  The patient does have mild renal insufficiency, with creatinine 1.67 and GFR 56.  He is hypokalemic, with a potassium of 2.8.      The patient was assigned a bed on 6D on the [facility] at approximately 8 AM.  He was transferred there by ambulance      Addendum: I spoke with Dr. Burden of Cardiology at about 0945 am.  Apparently, the cardiology team was not aware that the patient was coming to 6D.  I reviewed the chart.  Dr. Boswell, who initially evaluated the patient on the evening shift yesterday, has noted in his chart that he spoke with the cardiology service and the patient was accepted to the cardiology service during the evening shift.  Patient placement has Dr. Faust as accepting physician.  Apparently the process of admission should also include the cardiology resident.    This note was created in part by the use of Dragon voice recognition dictation system. Inadvertent grammatical errors and typographical errors may still exist.  MD Miladis Santamaria Alda L, MD  01/22/22 2697

## 2022-01-22 NOTE — ED TRIAGE NOTES
Patient states that his entire body is swollen, and that is started about two days ago. He states that this is new, and has only had his feet before. He states he went to [facility] about a month ago and that he had heart failure.

## 2022-01-22 NOTE — PROGRESS NOTES
At 1315 writer went into pt room after lunch break to round on patient. Pt had visitor in room on recliner and both visitor and patient were very sleepy. The room smelt like marijuana. Paged MD about situation and possibly drug testing. Security called for show of support when asking visitor to leave room. Pt is COVID positive, so visitor educated on hospital policy with visitors. Left without any issues.

## 2022-01-22 NOTE — PROGRESS NOTES
Major Shift Events: Pt admitted from ED for hypertension. Arrived on Bumex, nitroglycerin and Cardizem. All of those had been weaned off throughout the day. Home meds restarted. Pt in sinus tachy throughout the day, no flip into aflutter. NC placed on patient while sleeping, just 2L-probably sleep apnea. Continuous replacement of electrolytes. Total output today around 9L. No chest pain from patient, BP's on the softer side now, pt feeling well.       Plan: ECHO today, possible cardioversion on Monday if going into a flutter again. Drug testing. Strict I/O's.

## 2022-01-23 VITALS
HEIGHT: 74 IN | TEMPERATURE: 98.5 F | BODY MASS INDEX: 40.43 KG/M2 | HEART RATE: 116 BPM | WEIGHT: 315 LBS | RESPIRATION RATE: 12 BRPM | OXYGEN SATURATION: 97 % | DIASTOLIC BLOOD PRESSURE: 99 MMHG | SYSTOLIC BLOOD PRESSURE: 113 MMHG

## 2022-01-23 LAB
ANION GAP SERPL CALCULATED.3IONS-SCNC: 8 MMOL/L (ref 3–14)
ATRIAL RATE - MUSE: 169 BPM
BUN SERPL-MCNC: 21 MG/DL (ref 7–30)
CALCIUM SERPL-MCNC: 8.2 MG/DL (ref 8.5–10.1)
CHLORIDE BLD-SCNC: 95 MMOL/L (ref 94–109)
CO2 SERPL-SCNC: 34 MMOL/L (ref 20–32)
CREAT SERPL-MCNC: 1.86 MG/DL (ref 0.66–1.25)
DIASTOLIC BLOOD PRESSURE - MUSE: NORMAL MMHG
ERYTHROCYTE [DISTWIDTH] IN BLOOD BY AUTOMATED COUNT: 15.8 % (ref 10–15)
GFR SERPL CREATININE-BSD FRML MDRD: 49 ML/MIN/1.73M2
GLUCOSE BLD-MCNC: 92 MG/DL (ref 70–99)
HCT VFR BLD AUTO: 38.9 % (ref 40–53)
HGB BLD-MCNC: 11.2 G/DL (ref 13.3–17.7)
HOLD SPECIMEN: NORMAL
INTERPRETATION ECG - MUSE: NORMAL
MAGNESIUM SERPL-MCNC: 2 MG/DL (ref 1.6–2.3)
MCH RBC QN AUTO: 22.1 PG (ref 26.5–33)
MCHC RBC AUTO-ENTMCNC: 28.8 G/DL (ref 31.5–36.5)
MCV RBC AUTO: 77 FL (ref 78–100)
P AXIS - MUSE: 21 DEGREES
PLATELET # BLD AUTO: 264 10E3/UL (ref 150–450)
POTASSIUM BLD-SCNC: 3.1 MMOL/L (ref 3.4–5.3)
POTASSIUM BLD-SCNC: 3.5 MMOL/L (ref 3.4–5.3)
PR INTERVAL - MUSE: 136 MS
QRS DURATION - MUSE: 94 MS
QT - MUSE: 246 MS
QTC - MUSE: 412 MS
R AXIS - MUSE: 27 DEGREES
RBC # BLD AUTO: 5.06 10E6/UL (ref 4.4–5.9)
SODIUM SERPL-SCNC: 137 MMOL/L (ref 133–144)
SYSTOLIC BLOOD PRESSURE - MUSE: NORMAL MMHG
T AXIS - MUSE: 254 DEGREES
VENTRICULAR RATE- MUSE: 169 BPM
WBC # BLD AUTO: 7.7 10E3/UL (ref 4–11)

## 2022-01-23 PROCEDURE — 250N000013 HC RX MED GY IP 250 OP 250 PS 637: Performed by: PHYSICIAN ASSISTANT

## 2022-01-23 PROCEDURE — 99239 HOSP IP/OBS DSCHRG MGMT >30: CPT | Mod: FS | Performed by: INTERNAL MEDICINE

## 2022-01-23 PROCEDURE — 250N000013 HC RX MED GY IP 250 OP 250 PS 637: Performed by: STUDENT IN AN ORGANIZED HEALTH CARE EDUCATION/TRAINING PROGRAM

## 2022-01-23 PROCEDURE — 93005 ELECTROCARDIOGRAM TRACING: CPT

## 2022-01-23 PROCEDURE — 93010 ELECTROCARDIOGRAM REPORT: CPT | Performed by: INTERNAL MEDICINE

## 2022-01-23 PROCEDURE — 84132 ASSAY OF SERUM POTASSIUM: CPT | Performed by: INTERNAL MEDICINE

## 2022-01-23 PROCEDURE — 36415 COLL VENOUS BLD VENIPUNCTURE: CPT | Performed by: NURSE PRACTITIONER

## 2022-01-23 PROCEDURE — 83735 ASSAY OF MAGNESIUM: CPT | Performed by: NURSE PRACTITIONER

## 2022-01-23 PROCEDURE — 85027 COMPLETE CBC AUTOMATED: CPT | Performed by: NURSE PRACTITIONER

## 2022-01-23 PROCEDURE — 250N000013 HC RX MED GY IP 250 OP 250 PS 637: Performed by: INTERNAL MEDICINE

## 2022-01-23 PROCEDURE — 36415 COLL VENOUS BLD VENIPUNCTURE: CPT | Performed by: INTERNAL MEDICINE

## 2022-01-23 PROCEDURE — 250N000013 HC RX MED GY IP 250 OP 250 PS 637: Performed by: NURSE PRACTITIONER

## 2022-01-23 PROCEDURE — 84132 ASSAY OF SERUM POTASSIUM: CPT | Performed by: NURSE PRACTITIONER

## 2022-01-23 RX ORDER — POTASSIUM CHLORIDE 750 MG/1
20 TABLET, EXTENDED RELEASE ORAL ONCE
Status: COMPLETED | OUTPATIENT
Start: 2022-01-23 | End: 2022-01-23

## 2022-01-23 RX ORDER — TORSEMIDE 20 MG/1
20 TABLET ORAL
Qty: 60 TABLET | Refills: 0 | Status: SHIPPED | OUTPATIENT
Start: 2022-01-23 | End: 2023-04-23

## 2022-01-23 RX ORDER — POTASSIUM CHLORIDE 750 MG/1
40 TABLET, EXTENDED RELEASE ORAL ONCE
Status: COMPLETED | OUTPATIENT
Start: 2022-01-23 | End: 2022-01-23

## 2022-01-23 RX ORDER — LISINOPRIL 40 MG/1
40 TABLET ORAL DAILY
Qty: 30 TABLET | Refills: 0 | Status: ON HOLD | OUTPATIENT
Start: 2022-01-24 | End: 2023-04-21

## 2022-01-23 RX ORDER — METOPROLOL SUCCINATE 25 MG/1
25 TABLET, EXTENDED RELEASE ORAL DAILY
Qty: 30 TABLET | Refills: 0 | Status: SHIPPED | OUTPATIENT
Start: 2022-01-24 | End: 2023-04-23

## 2022-01-23 RX ORDER — TORSEMIDE 20 MG/1
20 TABLET ORAL
Status: DISCONTINUED | OUTPATIENT
Start: 2022-01-23 | End: 2022-01-23 | Stop reason: HOSPADM

## 2022-01-23 RX ADMIN — POTASSIUM CHLORIDE 40 MEQ: 750 TABLET, EXTENDED RELEASE ORAL at 09:00

## 2022-01-23 RX ADMIN — DICLOFENAC SODIUM 2 G: 10 GEL TOPICAL at 00:06

## 2022-01-23 RX ADMIN — APIXABAN 5 MG: 5 TABLET, FILM COATED ORAL at 09:01

## 2022-01-23 RX ADMIN — METOPROLOL SUCCINATE 25 MG: 25 TABLET, FILM COATED, EXTENDED RELEASE ORAL at 09:01

## 2022-01-23 RX ADMIN — TORSEMIDE 20 MG: 20 TABLET ORAL at 11:45

## 2022-01-23 RX ADMIN — LIDOCAINE 1 PATCH: 560 PATCH PERCUTANEOUS; TOPICAL; TRANSDERMAL at 00:06

## 2022-01-23 RX ADMIN — LISINOPRIL 40 MG: 40 TABLET ORAL at 09:01

## 2022-01-23 RX ADMIN — POTASSIUM CHLORIDE 20 MEQ: 750 TABLET, EXTENDED RELEASE ORAL at 00:59

## 2022-01-23 RX ADMIN — PANTOPRAZOLE SODIUM 40 MG: 40 TABLET, DELAYED RELEASE ORAL at 09:01

## 2022-01-23 ASSESSMENT — ACTIVITIES OF DAILY LIVING (ADL)
ADLS_ACUITY_SCORE: 6

## 2022-01-23 NOTE — PLAN OF CARE
Major Shift Events:  A & O X 4, VSS except HR ST but under 110 all night and no aflutter. K+ still low w/recheck at 0000 so replaced again w/rechk this AM. C/o abdominal pain w/3 spots around umbilibus hardened and painful w/breathing so gave prn tylenol and MD ordered voltaren gel and lidocaine patch placed over umbilicus. On RA at beginning of shift, but de-sat into low 80's so put 2L O2 via NC, later in shift dipped down again so increased O2 to 3L.   Plan: continue w/POC and notify team if pt goes back into aflutter.  For vital signs and complete assessments, please see documentation flowsheets.

## 2022-01-23 NOTE — PLAN OF CARE
Vss and afebrile. This morning told RN  he was going to leave today due to prior commitments and no one available to /watch his children tonight. Stated he would come back tomorrow for any appointments. Primary team paged and came to bedside, discharge order placed and patient verbalized agreement to get follow up labs tomorrow and is awaiting cardiology follow up call. Escorted to front door via wheelchair with nursing staff. Aware of Covid-19 Positive result.   Sola Le RN CCRN on 1/23/2022 at 12:46 PM

## 2022-01-23 NOTE — DISCHARGE SUMMARY
[address] [address]  p: [phone]    Discharge Summary: Cardiology Service    Miguel Ángel Wilson MRN# [id number]   YOB: 1990 Age: 31 year old       Admission Date: 1/21/2022  Discharge Date: 01/23/22    Discharge Diagnoses:  # Acute on chronic HFrEF exacerbation  # Atrial flutter with RVR  # Elevated troponin, 2/2 tachycardia and HF  # Hypertensive urgency vs emergency   # Longstanding HTN  # New atrial flutter   # DORY on CKD   # Hypokalemia  # Hypomagnesemia   # COVID positive   # Hx of tobacco use  # Morbid obesity, BMI 42    Brief HPI:  Miguel Ángel Wilson is a 31 year old male with a history of HTN, HFrEF (45%), morbid obesity, and tobacco use who presented to [place] ED on 1/21/22 with worsening SOB and lower extremity edema. He was transferred to [facility] for management of decompensated HF.     Hospital Course by Diagnosis:  # Acute on chronic HFrEF exacerbation  # Atrial flutter with RVR  # Elevated troponin, 2/2 tachycardia and HF  Recently admitted at [facility] in 11/2021 with symptoms of SOB And lower extremity edema. TTE at that time showed LVEF 45% and LVH. Outpatient ischemic evaluation was recommended but has not yet been performed. Discharged on lisinopril 20 mg and carvedilol. Lasix started by PCP in 12/21. He presented with ongoing dyspnea since November and worsening lower extremity edema. Initial EKG in ED showed Aflutter with . NTpBNP 6200. CXR nml. HS troponin 141 >104. Troponin elevation likely demand in the setting of decompensated heart failure, hypertension, and tachycardia. Underwent aggressive diuresis with IV bumex gtt with resolution of dyspnea and improvement of lower extremity edema. Repeat echo on 1/22/22 with LVEF of 30-35% with diffuse hypokinesis and mild to moderate concentric thickening consistent with LVH. Unknown etiology of heart failure, differential dx includes uncontrolled HTN vs ischemic  vs tachy-mediated. Less likely ischemic given young age and diffuse nature of hypokinesis. Admit weight 348 lb. Discharge weight 321 lb. Patient requested to leave due to personal obligations before initiation of AA and SGLT2-inhibitors. He will require close follow up in [facility] and Cardiology.   - volume status: mild hypervolemia, 1+ EMILY but clear lungs  - diuretics: start po torsemide 20 mg BID  - BB: stopped carvedilol due to fatigue and started Toprol XL 25 mg daily  - AA: consider starting in OP setting   - SGLT2-inhibitor: consider starting in OP setting.   - [facility] consult placed  - follow up with Cardiology   - BMP on 1/24/22 to follow K and Cr  - Follow up with PCP in 1 week   - consider outpatient ischemic evaluation.     # Hypertensive urgency vs emergency   # Longstanding HTN  Longstanding hx of hypertension as evidence by LVH on TTE. PTA carvedilol and lisinopril. Initial /156. Pt reported noncompliance with PTA carvedilol due to associated fatigue. BP initially controlled with nitroglycerin gtt.   - increased PTA lisinopril from 20 mg to 40 mg daily   - stopped carvedilol and started Toprol XL 25 mg daily   - follow up with PCP in 1 week for further BP monitoring   - goal BP < 130/80     # New atrial flutter   Initial EKG showed Aflutter with HR > 160, symptomatic. Initially place on diltiazem gtt in ED, stopped upon transfer. Unsure of chronicity. [id number] 2 (CHF, HTN). Spontaneously converted to sinus rhythm.   - started on apixaban 5 mg BID   - rate controlled with Toprol XL     # DORY on CKD   # Hypokalemia  # Hypomagnesemia   Baseline Cr uncertain. Cr 1.7 on 11/30/21 at [facility]. Pt likely has CKD due to uncontrolled HTN. Cr 1.57 on admission. Acute rise in Cr/BUN/Bicarb with aggressive diuresis to 1.86/21/34 at time of discharge. Likely pre-renal due to diuresis. Hypokalemia and hypomagnesemia 2/2 diuresis, s/p repletion.   - torsemide 20 mg BID   - repeat BMP on 1/24/22  - follow up with  PCP     # COVID positive   Unvaccinated, COVID positive on 1/21/22. CXR w/o signs of pneumonia. Stable on RA.   - No indication to treat at this time   -  5 day quarantine followed by 5 days of masking recommended upon discharge     # Hx of tobacco use  Patient reports longstanding hx of tobacco use since age 15, 1 ppd. Recently quit in November of last year.     # Morbid obesity, BMI 42       Pertinent Procedures:  None    Consults:  None      Discharge medications:   Discharge Medication List as of 1/23/2022 12:03 PM      START taking these medications    Details   apixaban ANTICOAGULANT (ELIQUIS) 5 MG tablet Take 1 tablet (5 mg) by mouth 2 times daily, Disp-60 tablet, R-0, E-Prescribe      metoprolol succinate ER (TOPROL-XL) 25 MG 24 hr tablet Take 1 tablet (25 mg) by mouth daily, Disp-30 tablet, R-0, E-Prescribe      torsemide (DEMADEX) 20 MG tablet Take 1 tablet (20 mg) by mouth 2 times daily, Disp-60 tablet, R-0, E-Prescribe         CONTINUE these medications which have CHANGED    Details   lisinopril (ZESTRIL) 40 MG tablet Take 1 tablet (40 mg) by mouth daily, Disp-30 tablet, R-0, E-Prescribe         CONTINUE these medications which have NOT CHANGED    Details   gabapentin (NEURONTIN) 300 MG capsule Take 1 capsule (300 mg) by mouth 3 times daily, Disp-90 capsule, R-0, Local Print      oxyCODONE (ROXICODONE) 5 MG immediate release tablet Take 1 tablet (5 mg) by mouth every 6 hours as needed for pain, Disp-20 tablet, R-0, Local Print             Follow-up:  [facility]   Cardiology   PCP in 1 week     Labs or imaging requiring follow-up after discharge:  BMP on 1/24/22    Code status:  Full    Condition on discharge  Temp:  [97.4  F (36.3  C)-98.5  F (36.9  C)] 98.5  F (36.9  C)  Pulse:  [101-116] 116  Resp:  [8-37] 12  BP: ()/(58-99) 113/99  SpO2:  [80 %-98 %] 97 %  General: Alert, interactive, no acute distress, morbidly obese male.   HEENT: Normocephalic, atraumatic. Sclera anicteric.   Neck: JVP not  elevated.   Cardiovascular: mildly tachycardic, Regular rate and rhythm, normal S1 and S2, no murmurs, gallops, or rubs. Radial pulses palpable bilaterally.   Resp: Regular work of breathing on room air. Clear to auscultation bilaterally, no rales, wheezes, or rhonchi  GI: obese abdomen with tattoos.   Extremities: 1+ bilateral lower extremity edema, no cyanosis or clubbing, warm and well perfused.  Skin: Warm and dry, no jaundice or rash, tattoos across arms, chest, and abdomen  Neuro: Alert and oriented x 3, moves all extremities equally, normal speech  Psych: Mood and affect are appropriate    Imaging with results:  Echocardiogram 1/22/22:  Interpretation Summary  Left ventricular function is decreased. The ejection fraction is 30-35%  (moderately reduced).  Moderate diffuse hypokinesis is present. Mild to moderate concentric wall  thickening consistent with left ventricular hypertrophy is present.  Global right ventricular function is mildly reduced.  No significant valvular abnormalities present.  No pericardial effusion is present.  IVC diameter >2.1 cm collapsing <50% with sniff suggests a high RA pressure  estimated at 15 mmHg or greater.  There is no prior study for direct comparison.    EKG 1/23/22    Recent Labs   Lab 01/23/22  0700 01/23/22  0002 01/22/22  1740 01/22/22  1203 01/22/22  0752 01/21/22  2045     --  137  --  140 140   POTASSIUM 3.1* 3.5 3.0* 2.8* 2.8* 3.4   CHLORIDE 95  --  94  --  96 103   CO2 34*  --  35*  --  38* 29   ANIONGAP 8  --  8  --  6 8   GLC 92  --  159*  --  143* 109*   BUN 21  --  20  --  20 23   CR 1.86*  --  1.84* 1.57* 1.64* 1.67*   GFRESTIMATED 49*  --  50* 60* 57* 56*   EFREM 8.2*  --  8.0*  --  8.5 8.6   MAG 2.0  --   --  1.3*  --   --          Patient Care Team:  No Ref-Primary, Physician as PCP - General    >30 minutes spent in discharge, including >50% in counseling and coordination of care, medication review and plan of care recommended on follow up. Questions  were answered.   It was our pleasure to care for Miguel Ángel Wilson during this hospitalization. Please do not hesitate to contact me should there be questions regarding the hospital course or discharge plan.    Patient discussed with staff cardiologist, Dr. Burden.     Marivel Willis PA-C  [facility] Cardiology

## 2022-01-24 ENCOUNTER — CARE COORDINATION (OUTPATIENT)
Dept: CARDIOLOGY | Facility: CLINIC | Age: 32
End: 2022-01-24
Payer: COMMERCIAL

## 2022-01-24 DIAGNOSIS — E66.01 MORBID OBESITY (H): ICD-10-CM

## 2022-01-24 DIAGNOSIS — I10 ESSENTIAL HYPERTENSION: ICD-10-CM

## 2022-01-24 DIAGNOSIS — Z72.0 TOBACCO USE: ICD-10-CM

## 2022-01-24 DIAGNOSIS — I48.92 ATRIAL FLUTTER WITH RAPID VENTRICULAR RESPONSE (H): ICD-10-CM

## 2022-01-24 LAB
ATRIAL RATE - MUSE: 117 BPM
DIASTOLIC BLOOD PRESSURE - MUSE: NORMAL MMHG
INTERPRETATION ECG - MUSE: NORMAL
P AXIS - MUSE: 42 DEGREES
PR INTERVAL - MUSE: 162 MS
QRS DURATION - MUSE: 78 MS
QT - MUSE: 344 MS
QTC - MUSE: 479 MS
R AXIS - MUSE: 24 DEGREES
SYSTOLIC BLOOD PRESSURE - MUSE: NORMAL MMHG
T AXIS - MUSE: 200 DEGREES
VENTRICULAR RATE- MUSE: 117 BPM

## 2022-01-24 NOTE — PROGRESS NOTES
S/B: received referral but needs further investigation. Referred by Marivel Willis PA-C Phone: [phone]; Fax: [phone]      Plan: scheduled with Christen on 2/10.

## 2022-01-26 NOTE — PROGRESS NOTES
Addendum to Discharge Summary dated 1/23/22    # CKD, undetermined  Baseline Cr unknown.     Marivel Willis PA-C

## 2022-01-28 DIAGNOSIS — I50.20 HEART FAILURE WITH REDUCED EJECTION FRACTION, NYHA CLASS I (H): Primary | ICD-10-CM

## 2022-01-31 PROBLEM — E66.01 MORBID OBESITY (H): Status: ACTIVE | Noted: 2022-01-31

## 2022-01-31 PROBLEM — Z72.0 TOBACCO USE: Status: ACTIVE | Noted: 2022-01-31

## 2022-01-31 PROBLEM — I48.92 ATRIAL FLUTTER WITH RAPID VENTRICULAR RESPONSE (H): Status: ACTIVE | Noted: 2022-01-31

## 2022-01-31 PROBLEM — I10 ESSENTIAL HYPERTENSION: Status: ACTIVE | Noted: 2022-01-31

## 2022-01-31 NOTE — PROGRESS NOTES
Received referral for HF from inpatient service.  Patient was recently hospitalized at [facility] for HF exacerbation. He has also been followed at [facility]. I spoke to him last week and he reported he wished to follow up with a cardiologist at the  instead of [facility].  I called him back and left 2 messages that we have scheduled him for labs and cheri be seen on Feb 10.  Of note, he tested positive for Covid on 1/21 when he left the hospital. This appt will be 3 weeks out from the positive test.

## 2022-03-10 ENCOUNTER — HOSPITAL ENCOUNTER (EMERGENCY)
Facility: CLINIC | Age: 32
Discharge: HOME | End: 2022-03-10
Attending: EMERGENCY MEDICINE | Admitting: EMERGENCY MEDICINE
Payer: COMMERCIAL

## 2022-03-10 VITALS
DIASTOLIC BLOOD PRESSURE: 112 MMHG | HEART RATE: 124 BPM | BODY MASS INDEX: 40.08 KG/M2 | SYSTOLIC BLOOD PRESSURE: 153 MMHG | WEIGHT: 309 LBS | OXYGEN SATURATION: 99 % | TEMPERATURE: 98.5 F | RESPIRATION RATE: 16 BRPM

## 2022-03-10 DIAGNOSIS — K04.7 TOOTH ABSCESS: ICD-10-CM

## 2022-03-10 PROCEDURE — 41800 DRAINAGE OF GUM LESION: CPT | Performed by: EMERGENCY MEDICINE

## 2022-03-10 PROCEDURE — 99284 EMERGENCY DEPT VISIT MOD MDM: CPT | Mod: 25 | Performed by: EMERGENCY MEDICINE

## 2022-03-10 PROCEDURE — 250N000013 HC RX MED GY IP 250 OP 250 PS 637: Performed by: EMERGENCY MEDICINE

## 2022-03-10 PROCEDURE — 99283 EMERGENCY DEPT VISIT LOW MDM: CPT | Mod: 25 | Performed by: EMERGENCY MEDICINE

## 2022-03-10 RX ORDER — OXYCODONE HYDROCHLORIDE 5 MG/1
5 TABLET ORAL EVERY 4 HOURS PRN
Qty: 18 TABLET | Refills: 0 | Status: SHIPPED | OUTPATIENT
Start: 2022-03-10 | End: 2022-03-13

## 2022-03-10 RX ORDER — LIDOCAINE HYDROCHLORIDE AND EPINEPHRINE 10; 10 MG/ML; UG/ML
30 INJECTION, SOLUTION INFILTRATION; PERINEURAL ONCE
Status: DISCONTINUED | OUTPATIENT
Start: 2022-03-10 | End: 2022-03-11 | Stop reason: HOSPADM

## 2022-03-10 RX ORDER — ACETAMINOPHEN 500 MG
500-1000 TABLET ORAL EVERY 6 HOURS PRN
Status: ON HOLD | COMMUNITY
End: 2023-04-19

## 2022-03-10 RX ORDER — OXYCODONE HYDROCHLORIDE 5 MG/1
10 TABLET ORAL ONCE
Status: COMPLETED | OUTPATIENT
Start: 2022-03-10 | End: 2022-03-10

## 2022-03-10 RX ADMIN — OXYCODONE HYDROCHLORIDE 10 MG: 5 TABLET ORAL at 23:01

## 2022-03-10 ASSESSMENT — ENCOUNTER SYMPTOMS: FEVER: 0

## 2022-03-11 NOTE — DISCHARGE INSTRUCTIONS
Follow-up with your primary care provider and your dentist.  Return to the emergency department as needed for any new or worsening symptoms.

## 2022-03-11 NOTE — ED PROVIDER NOTES
ED Provider Note  [facility]      History     Chief Complaint   Patient presents with     Dental Pain     reports has worsening dental pain since two days ago, possible abscess, denies fevers     The history is provided by the patient and medical records.   Dental Pain  Associated symptoms: no fever      Miguel Ángel Wilson is a 31 year old male with history of HFrEF (45%), atrial flutter w/ RVR, HTN, anticoagulated on Eliquis presenting to the ED with dental pain. Per review of chart patient was seen by dentistry 2 days ago for pain and marble size swelling on lingual to tooth #7. XR revealed extensive caries to pulp with #8 drifted into the M of #7 and large PARL. Extraction was recommended, but patient's BP was too high so he was placed on Amoxicillin 500 mg TID x 10 days and Tylenol-codeine #3 to control infection and to return for extraction when BP controlled and he has taken his BP meds.     Patient presents today complaining of worsening pain for the last 2 days. He rates the pain a 20/10. He states that he has been taking all of his meds including the antibiotics, antihypertensives, and diuretics. He has been unable to see his PCP to address his HTN, was able to make an appointment next week. No fevers.     Past Medical History  Past Medical History:   Diagnosis Date     Atrial flutter with rapid ventricular response (H) 1/31/2022     Essential hypertension 1/31/2022     Hypertension      Morbid obesity (H) 1/31/2022     Tobacco use 1/31/2022     History reviewed. No pertinent surgical history.  acetaminophen (TYLENOL) 500 MG tablet  oxyCODONE (ROXICODONE) 5 MG tablet  apixaban ANTICOAGULANT (ELIQUIS) 5 MG tablet  gabapentin (NEURONTIN) 300 MG capsule  lisinopril (ZESTRIL) 40 MG tablet  metoprolol succinate ER (TOPROL-XL) 25 MG 24 hr tablet  torsemide (DEMADEX) 20 MG tablet      No Known Allergies  Family History  History reviewed. No pertinent family history.  Social History   Social  History     Tobacco Use     Smoking status: Current Some Day Smoker     Smokeless tobacco: Never Used   Substance Use Topics     Alcohol use: No     Drug use: No      Past medical history, past surgical history, medications, allergies, family history, and social history were reviewed with the patient. No additional pertinent items.       Review of Systems   Constitutional: Negative for fever.   HENT: Positive for dental problem.      A complete review of systems was performed with pertinent positives and negatives noted in the HPI, and all other systems negative.    Physical Exam   BP: (!) 153/112  Pulse: (!) 124  Temp: 98.5  F (36.9  C)  Resp: 16  Weight: 140.2 kg (309 lb)  SpO2: 99 %  Physical Exam  Vitals and nursing note reviewed.   Constitutional:       General: He is not in acute distress.     Appearance: He is well-developed.   HENT:      Head: Normocephalic.      Right Ear: External ear normal.      Left Ear: External ear normal.      Nose: Nose normal.      Mouth/Throat:      Comments: Poor dentition, tenderness right upper tooth with abscess on the gum tissue of the upper palate  Eyes:      Extraocular Movements: Extraocular movements intact.      Conjunctiva/sclera: Conjunctivae normal.   Cardiovascular:      Rate and Rhythm: Tachycardia present.   Pulmonary:      Effort: Pulmonary effort is normal.   Abdominal:      General: There is no distension.   Musculoskeletal:         General: No deformity. Normal range of motion.      Cervical back: Normal range of motion and neck supple.   Skin:     General: Skin is warm and dry.   Neurological:      Mental Status: He is alert. Mental status is at baseline.      Comments: Oriented   Psychiatric:         Mood and Affect: Mood normal.         Behavior: Behavior normal.         ED Course      Procedures          No results found for any visits on 03/10/22.  Medications   lidocaine 1% with EPINEPHrine 1:100,000 injection 30 mL (has no administration in time range)    oxyCODONE (ROXICODONE) tablet 10 mg (10 mg Oral Given 3/10/22 6824)           Procedure: Incision and Drainage   LOCATIONS: Mouth     ANESTHESIA:  Local field block using Lidocaine 1% with epinephrine, total of 1 mLs     PREPARATION:  Cleansed with Normal Saline     PROCEDURE:  Area was incised with # 11 Blade (Sharp Point) with a Single Straight incision.  Wound treatment included Deloculation, Purulent Drainage and Expression of Material.  Packing consisted of No Packing.  Appropriate dressing was applied to cover the area.    Patient Status:        Patient tolerated the procedure well. There were no complications.                    Assessments & Plan (with Medical Decision Making)   31-year-old male presents to us with a chief complaint of tooth pain and abscess.  He did present himself to the dental clinic however his blood pressure was uncontrolled so they recommended he follow-up for blood pressure medications with his primary care and return for tooth extraction later.  I did I&D the abscess.  There is small amount of purulent fluid did come out.  We will give the patient a prescription for oxycodone and he will follow-up with his primary care team next week for further assessment of his blood pressure.  He has been on amoxicillin for the last day or so and he will continue that until he seen by dental.    I have reviewed the nursing notes. I have reviewed the findings, diagnosis, plan and need for follow up with the patient.    Discharge Medication List as of 3/10/2022 11:13 PM          Final diagnoses:   Tooth abscess   IMarline, am serving as a trained medical scribe to document services personally performed by Patirck Webster DO, based on the provider's statements to me.     IPatrick DO, was physically present and have reviewed and verified the accuracy of this note documented by Marline Ackerman.      --  Patrick Webster DO  [facility] EMERGENCY  DEPARTMENT  3/10/2022     Patrick Webster,   03/10/22 7338

## 2022-04-14 ENCOUNTER — MEDICAL CORRESPONDENCE (OUTPATIENT)
Dept: HEALTH INFORMATION MANAGEMENT | Facility: CLINIC | Age: 32
End: 2022-04-14
Payer: COMMERCIAL

## 2022-04-18 DIAGNOSIS — G47.30 SLEEP APNEA: Primary | ICD-10-CM

## 2022-07-05 ENCOUNTER — TELEPHONE (OUTPATIENT)
Dept: SLEEP MEDICINE | Facility: CLINIC | Age: 32
End: 2022-07-05

## 2022-07-05 ENCOUNTER — VIRTUAL VISIT (OUTPATIENT)
Dept: SLEEP MEDICINE | Facility: CLINIC | Age: 32
End: 2022-07-05
Payer: COMMERCIAL

## 2022-07-05 VITALS — BODY MASS INDEX: 39.67 KG/M2 | HEIGHT: 74 IN

## 2022-07-05 DIAGNOSIS — I50.20 HEART FAILURE WITH REDUCED EJECTION FRACTION, NYHA CLASS I (H): ICD-10-CM

## 2022-07-05 DIAGNOSIS — E66.9 OBESITY (BMI 30-39.9): ICD-10-CM

## 2022-07-05 DIAGNOSIS — G47.9 SLEEP DISTURBANCE: Primary | ICD-10-CM

## 2022-07-05 DIAGNOSIS — R06.83 SNORING: ICD-10-CM

## 2022-07-05 DIAGNOSIS — I48.92 ATRIAL FLUTTER WITH RAPID VENTRICULAR RESPONSE (H): ICD-10-CM

## 2022-07-05 DIAGNOSIS — Z91.89 RISK FACTORS FOR OBSTRUCTIVE SLEEP APNEA: ICD-10-CM

## 2022-07-05 DIAGNOSIS — I10 ESSENTIAL HYPERTENSION: ICD-10-CM

## 2022-07-05 PROBLEM — I50.9 CHRONIC CONGESTIVE HEART FAILURE (H): Status: ACTIVE | Noted: 2021-11-28

## 2022-07-05 PROBLEM — U07.1 COVID-19 VIRUS INFECTION: Status: ACTIVE | Noted: 2022-04-20

## 2022-07-05 PROCEDURE — 99203 OFFICE O/P NEW LOW 30 MIN: CPT | Mod: 95 | Performed by: NURSE PRACTITIONER

## 2022-07-05 ASSESSMENT — SLEEP AND FATIGUE QUESTIONNAIRES
HOW LIKELY ARE YOU TO NOD OFF OR FALL ASLEEP WHILE SITTING AND READING: WOULD NEVER DOZE
HOW LIKELY ARE YOU TO NOD OFF OR FALL ASLEEP IN A CAR, WHILE STOPPED FOR A FEW MINUTES IN TRAFFIC: WOULD NEVER DOZE
HOW LIKELY ARE YOU TO NOD OFF OR FALL ASLEEP WHEN YOU ARE A PASSENGER IN A CAR FOR AN HOUR WITHOUT A BREAK: SLIGHT CHANCE OF DOZING
HOW LIKELY ARE YOU TO NOD OFF OR FALL ASLEEP WHILE SITTING QUIETLY AFTER LUNCH WITHOUT ALCOHOL: WOULD NEVER DOZE
HOW LIKELY ARE YOU TO NOD OFF OR FALL ASLEEP WHILE WATCHING TV: MODERATE CHANCE OF DOZING
HOW LIKELY ARE YOU TO NOD OFF OR FALL ASLEEP WHILE SITTING INACTIVE IN A PUBLIC PLACE: MODERATE CHANCE OF DOZING
HOW LIKELY ARE YOU TO NOD OFF OR FALL ASLEEP WHILE LYING DOWN TO REST IN THE AFTERNOON WHEN CIRCUMSTANCES PERMIT: WOULD NEVER DOZE
HOW LIKELY ARE YOU TO NOD OFF OR FALL ASLEEP WHILE SITTING AND TALKING TO SOMEONE: WOULD NEVER DOZE

## 2022-07-05 ASSESSMENT — PAIN SCALES - GENERAL: PAINLEVEL: EXTREME PAIN (8)

## 2022-07-05 NOTE — PATIENT INSTRUCTIONS
"      MY TREATMENT INFORMATION FOR SLEEP APNEA-  Miguel Ángel Wilson    DOCTOR : ASHWINI Nichols CNP    Am I having a sleep study at a sleep center?  --->Due to normal delays, you will be contacted within 2-4 weeks to schedule    Am I having a home sleep study?  --->Watch the video for the device you are using:    -/drop off device-   https://www."[employer]"ube.com/watch?v=yGGFBdELGhk    -Disposable device sent out require phone/computer application-   https://www."[employer]"ube.com/watch?v=BCce_vbiwxE      Frequently asked questions:  1. What is Obstructive Sleep Apnea (PARVEEN)? PARVEEN is the most common type of sleep apnea. Apnea means, \"without breath.\"  Apnea is most often caused by narrowing or collapse of the upper airway as muscles relax during sleep.   Almost everyone has occasional apneas. Most people with sleep apnea have had brief interruptions at night frequently for many years.  The severity of sleep apnea is related to how frequent and severe the events are.   2. What are the consequences of PARVEEN? Symptoms include: feeling sleepy during the day, snoring loudly, gasping or stopping of breathing, trouble sleeping, and occasionally morning headaches or heartburn at night.  Sleepiness can be serious and even increase the risk of falling asleep while driving. Other health consequences may include development of high blood pressure and other cardiovascular disease in persons who are susceptible. Untreated PARVEEN  can contribute to heart disease, stroke and diabetes.   3. What are the treatment options? In most situations, sleep apnea is a lifelong disease that must be managed with daily therapy. Medications are not effective for sleep apnea and surgery is generally not considered until other therapies have been tried. Your treatment is your choice . Continuous Positive Airway (CPAP) works right away and is the therapy that is effective in nearly everyone. An oral device to hold your jaw forward is usually the next most reliable " option. Other options include postioning devices (to keep you off your back), weight loss, and surgery including a tongue pacing device. There is more detail about some of these options below.  4. Are my sleep studies covered by insurance? Although we will request verification of coverage, we advise you also check in advance of the study to ensure there is coverage.    Important tips for those choosing CPAP and similar devices   Know your equipment:  CPAP is continuous positive airway pressure that prevents obstructive sleep apnea by keeping the throat from collapsing while you are sleeping. In most cases, the device is  smart  and can slowly self-adjusts if your throat collapses and keeps a record every day of how well you are treated-this information is available to you and your care team.  BPAP is bilevel positive airway pressure that keeps your throat open and also assists each breath with a pressure boost to maintain adequate breathing.  Special kinds of BPAP are used in patients who have inadequate breathing from lung or heart disease. In most cases, the device is  smart  and can slowly self-adjusts to assist breathing. Like CPAP, the device keeps a record of how well you are treated.  Your mask is your connection to the device. You get to choose what feels most comfortable and the staff will help to make sure if fits. Here: are some examples of the different masks that are available:       Key points to remember on your journey with sleep apnea:  Sleep study.  PAP devices often need to be adjusted during a sleep study to show that they are effective and adjusted right.  Good tips to remember: Try wearing just the mask during a quiet time during the day so your body adapts to wearing it. A humidifier is recommended for comfort in most cases to prevent drying of your nose and throat. Allergy medication from your provider may help you if you are having nasal congestion.  Getting settled-in. It takes more than  one night for most of us to get used to wearing a mask. Try wearing just the mask during a quiet time during the day so your body adapts to wearing it. A humidifier is recommended for comfort in most cases. Our team will work with you carefully on the first day and will be in contact within 4 days and again at 2 and 4 weeks for advice and remote device adjustments. Your therapy is evaluated by the device each day.   Use it every night. The more you are able to sleep naturally for 7-8 hours, the more likely you will have good sleep and to prevent health risks or symptoms from sleep apnea. Even if you use it 4 hours it helps. Occasionally all of us are unable to use a medical therapy, in sleep apnea, it is not dangerous to miss one night.   Communicate. Call our skilled team on the number provided on the first day if your visit for problems that make it difficult to wear the device. Over 2 out of 3 patients can learn to wear the device long-term with help from our team. Remember to call our team or your sleep providers if you are unable to wear the device as we may have other solutions for those who cannot adapt to mask CPAP therapy. It is recommended that you sleep your sleep provider within the first 3 months and yearly after that if you are not having problems.   Use it for your health. We encourage use of CPAP masks during daytime quiet periods to allow your face and brain to adapt to the sensation of CPAP so that it will be a more natural sensation to awaken to at night or during naps. This can be very useful during the first few weeks or months of adapting to CPAP though it does not help medically to wear CPAP during wakefulness and  should not be used as a strategy just to meet guidelines.  Take care of your equipment. Make sure you clean your mask and tubing using directions every day and that your filter and mask are replaced as recommended or if they are not working.     BESIDES CPAP, WHAT OTHER THERAPIES  ARE THERE?    Positioning Device  Positioning devices are generally used when sleep apnea is mild and only occurs on your back.This example shows a pillow that straps around the waist. It may be appropriate for those whose sleep study shows milder sleep apnea that occurs primarily when lying flat on one's back. Preliminary studies have shown benefit but effectiveness at home may need to be verified by a home sleep test. These devices are generally not covered by medical insurance.  Examples of devices that maintain sleeping on the back to prevent snoring and mild sleep apnea.    Belt type body positioner  http://[employer] [place]/    Electronic reminder  http://nightshifttherapy.com/  http://www.[employer] [place].au/      Oral Appliance  What is oral appliance therapy?  An oral appliance device fits on your teeth at night like a retainer used after having braces. The device is made by a specialized dentist and requires several visits over 1-2 months before a manufactured device is made to fit your teeth and is adjusted to prevent your sleep apnea. Once an oral device is working properly, snoring should be improved. A home sleep test may be recommended at that time if to determine whether the sleep apnea is adequately treated.       Some things to remember:  -Oral devices are often, but not always, covered by your medical insurance. Be sure to check with your insurance provider.   -If you are referred for oral therapy, you will be given a list of specialized dentists to consider or you may choose to visit the Web site of the American Academy of Dental Sleep Medicine  -Oral devices are less likely to work if you have severe sleep apnea or are extremely overweight.     More detailed information  An oral appliance is a small acrylic device that fits over the upper and lower teeth  (similar to a retainer or a mouth guard). This device slightly moves jaw forward, which moves the base of the tongue forward, opens the airway, improves  breathing for effective treat snoring and obstructive sleep apnea in perhaps 7 out of 10 people .  The best working devices are custom-made by a dental device  after a mold is made of the teeth 1, 2, 3.  When is an oral appliance indicated?  Oral appliance therapy is recommended as a first-line treatment for patients with primary snoring, mild sleep apnea, and for patients with moderate sleep apnea who prefer appliance therapy to use of CPAP4, 5. Severity of sleep apnea is determined by sleep testing and is based on the number of respiratory events per hour of sleep.   How successful is oral appliance therapy?  The success rate of oral appliance therapy in patients with mild sleep apnea is 75-80% while in patients with moderate sleep apnea it is 50-70%. The chance of success in patients with severe sleep apnea is 40-50%. The research also shows that oral appliances have a beneficial effect on the cardiovascular health of PARVEEN patients at the same magnitude as CPAP therapy7.  Oral appliances should be a second-line treatment in cases of severe sleep apnea, but if not completely successful then a combination therapy utilizing CPAP plus oral appliance therapy may be effective. Oral appliances tend to be effective in a broad range of patients although studies show that the patients who have the highest success are females, younger patients, those with milder disease, and less severe obesity. 3, 6.   Finding a dentist that practices dental sleep medicine  Specific training is available through the American Academy of Dental Sleep Medicine for dentists interested in working in the field of sleep. To find a dentist who is educated in the field of sleep and the use of oral appliances, near you, visit the Web site of the American Academy of Dental Sleep Medicine.    References  1. Awilda et al. Objectively measured vs self-reported compliance during oral appliance therapy for sleep-disordered breathing. Chest  2013; 144(5): 7125-1599.  2. Kwan, et al. Objective measurement of compliance during oral appliance therapy for sleep-disordered breathing. Thorax 2013; 68(1): 91-96.  3. Rodo et al. Mandibular advancement devices in 620 men and women with PARVEEN and snoring: tolerability and predictors of treatment success. Chest 2004; 125: 0171-9589.  4. Farnaz et al. Oral appliances for snoring and PARVEEN: a review. Sleep 2006; 29: 244-262.  5. Sarkis et al. Oral appliance treatment for PARVEEN: an update. J Clin Sleep Med 2014; 10(2): 215-227.  6. Yakov et al. Predictors of OSAH treatment outcome. J Dent Res 2007; 86: 0285-6810.      Weight Loss:    Weight loss is a long-term strategy that may improve sleep apnea in some patients.    Weight management is a personal decision and the decision should be based on your interest and the potential benefits.  If you are interested in exploring weight loss strategies, the following discussion covers the impact on weight loss on sleep apnea and the approaches that may be successful.    Being overweight does not necessarily mean you will have health consequences.  Those who have BMI over 35 or over 27 with existing medical conditions carries greater risk.   Weight loss decreases severity of sleep apnea in most people with obesity. For those with mild obesity who have developed snoring with weight gain, even 15-30 pound weight loss can improve and occasionally eliminate sleep apnea.  Structured and life-long dietary and health habits are necessary to lose weight and keep healthier weight levels.     Though there may be significant health benefits from weight loss, long-term weight loss is very difficult to achieve- studies show success with dietary management in less than 10% of people. In addition, substantial weight loss may require years of dietary control and may be difficult if patients have severe obesity. In these cases, surgical management may be considered.  Finally,  older individuals who have tolerated obesity without health complications may be less likely to benefit from weight loss strategies.      Your BMI is Body mass index is 39.67 kg/m .    What is BMI?  Body mass index (BMI) is one way to tell whether you are at a healthy weight, overweight, or obese. It measures your weight in relation to your height.  A BMI of 18.5 to 24.9 is in the healthy range. A person with a BMI of 25 to 29.9 is considered overweight, and someone with a BMI of 30 or greater is considered obese.  Another way to find out if you are at risk for health problems caused by overweight and obesity is to measure your waist. If you are a woman and your waist is more than 35 inches, or if you are a man and your waist is more than 40 inches, your risk of disease may be higher.  More than two-thirds of American adults are considered overweight or obese. Being overweight or obese increases the risk for further weight gain.  Excess weight may lead to heart disease and diabetes. Creating and following plans for healthy eating and physical activity may help you improve your health.    Methods for maintaining or losing weight.  Weight control is part of healthy lifestyle and includes exercise, emotional health, and healthy eating habits.  Careful eating habits lifelong is the mainstay of weight control.  Though there are significant health benefits from weight loss, long-term weight loss with diet alone may be very difficult to achieve- studies show long-term success with dietary management in less than 10% of people. Attaining a healthy weight may be especially difficult to achieve in those with severe obesity. In some cases, medications, devices and surgical management might be considered.    What can you do?  If you are overweight or obese and are interested in methods for weight loss, you should discuss this with your provider. In addition, we recommend that you review healthy life styles and methods for  weight loss available through the National Institutes of Health patient information sites:   http://win.niddk.nih.gov/publications/index.htm    Surgery:    Surgery for obstructive sleep apnea is considered generally only when other therapies fail to work. Surgery may be discussed with you if you are having a difficult time tolerating CPAP and or when there is an abnormal structure that requires surgical correction.  Nose and throat surgeries often enlarge the airway to prevent collapse.  Most of these surgeries create pain for 1-2 weeks and up to half of the most common surgeries are not effective throughout life.  You should carefully discuss the benefits and drawbacks to surgery with your sleep provider and surgeon to determine if it is the best solution for you.   More information  Surgery for PARVEEN is directed at areas that are responsible for narrowing or complete obstruction of the airway during sleep.  There are a wide range of procedures available to enlarge and/or stabilize the airway to prevent blockage of breathing in the three major areas where it can occur: the palate, tongue, and nasal regions.  Successful surgical treatment depends on the accurate identification of the factors responsible for obstructive sleep apnea in each person.  A personalized approach is required because there is no single treatment that works well for everyone.  Because of anatomic variation, consultation with an examination by a sleep surgeon is a critical first step in determining what surgical options are best for each patient.  In some cases, examination during sedation may be recommended in order to guide the selection of procedures.  Patients will be counseled about risks and benefits as well as the typical recovery course after surgery. Surgery is typically not a cure for a person s PARVEEN.  However, surgery will often significantly improve one s PARVEEN severity (termed  success rate ).  Even in the absence of a cure, surgery  will decrease the cardiovascular risk associated with OSA7; improve overall quality of life8 (sleepiness, functionality, sleep quality, etc).      Palate Procedures:  Patients with PARVEEN often have narrowing of their airway in the region of their tonsils and uvula.  The goals of palate procedures are to widen the airway in this region as well as to help the tissues resist collapse.  Modern palate procedure techniques focus on tissue conservation and soft tissue rearrangement, rather than tissue removal.  Often the uvula is preserved in this procedure. Residual sleep apnea is common in patient after pharyngoplasty with an average reduction in sleep apnea events of 33%2.      Tongue Procedures:  ExamWhile patients are awake, the muscles that surround the throat are active and keep this region open for breathing. These muscles relax during sleep, allowing the tongue and other structures to collapse and block breathing.  There are several different tongue procedures available.  Selection of a tongue base procedure depends on characteristics seen on physical exam.  Generally, procedures are aimed at removing bulky tissues in this area or preventing the back of the tongue from falling back during sleep.  Success rates for tongue surgery range from 50-62%3.    Hypoglossal Nerve Stimulation:  Hypoglossal nerve stimulation has recently received approval from the [place] Food and Drug Administration for the treatment of obstructive sleep apnea.  This is based on research showing that the system was safe and effective in treating sleep apnea6.  Results showed that the median AHI score decreased 68%, from 29.3 to 9.0. This therapy uses an implant system that senses breathing patterns and delivers mild stimulation to airway muscles, which keeps the airway open during sleep.  The system consists of three fully implanted components: a small generator (similar in size to a pacemaker), a breathing sensor, and a stimulation lead.   Using a small handheld remote, a patient turns the therapy on before bed and off upon awakening.    Candidates for this device must be greater than 22 years of age, have moderate to severe PARVEEN (AHI between 20-65), BMI less than 32, have tried CPAP/oral appliance without success, and have appropriate upper airway anatomy (determined by a sleep endoscopy performed by Dr. Champagne).    Hypoglossal Nerve Stimulation Pathway:    The sleep surgeon s office will work with the patient through the insurance prior-authorization process (including communications and appeals).    Nasal Procedures:  Nasal obstruction can interfere with nasal breathing during the day and night.  Studies have shown that relief of nasal obstruction can improve the ability of some patients to tolerate positive airway pressure therapy for obstructive sleep apnea1.  Treatment options include medications such as nasal saline, topical corticosteroid and antihistamine sprays, and oral medications such as antihistamines or decongestants. Non-surgical treatments can include external nasal dilators for selected patients. If these are not successful by themselves, surgery can improve the nasal airway either alone or in combination with these other options.      Combination Procedures:  Combination of surgical procedures and other treatments may be recommended, particularly if patients have more than one area of narrowing or persistent positional disease.  The success rate of combination surgery ranges from 66-80%2,3.    References  Oumar FIORE. The Role of the Nose in Snoring and Obstructive Sleep Apnoea: An Update.  Eur Arch Otorhinolaryngol. 2011; 268: 1365-73.   Redd SM; Maria Fernanda JA; Cam JR; Pallanch JF; Romeo MB; Aparna SG; Merle ALATORRE. Surgical modifications of the upper airway for obstructive sleep apnea in adults: a systematic review and meta-analysis. SLEEP 2010;33(10):2106-7747. Chiqui VIEIRA. Hypopharyngeal surgery in obstructive sleep apnea: an  evidence-based medicine review.  Arch Otolaryngol Head Neck Surg. 2006 Feb;132(2):206-13.  Bony YH1, Noel Y, Marcelino TONA. The efficacy of anatomically based multilevel surgery for obstructive sleep apnea. Otolaryngol Head Neck Surg. 2003 Oct;129(4):327-35.  Chiqui VIEIRA, Goldberg A. Hypopharyngeal Surgery in Obstructive Sleep Apnea: An Evidence-Based Medicine Review. Arch Otolaryngol Head Neck Surg. 2006 Feb;132(2):206-13.  Maria A SILVEIRA et al. Upper-Airway Stimulation for Obstructive Sleep Apnea.  N Engl J Med. 2014 Jan 9;370(2):139-49.  Leander Y et al. Increased Incidence of Cardiovascular Disease in Middle-aged Men with Obstructive Sleep Apnea. Am J Respir Crit Care Med; 2002 166: 159-165  Saenzrosario BARDALES et al. Studying Life Effects and Effectiveness of Palatopharyngoplasty (SLEEP) study: Subjective Outcomes of Isolated Uvulopalatopharyngoplasty. Otolaryngol Head Neck Surg. 2011; 144: 623-631.        WHAT IF I ONLY HAVE SNORING?    Mandibular advancement devices, lateral sleep positioning, long-term weight loss and treatment of nasal allergies have been shown to improve snoring.  Exercising tongue muscles with a game (https://www.ncbi.nlm.nih.gov/pubmed/08133473) or stimulating the tongue during the day with a device (https://doi.org/10.3390/upp65969787) have improved snoring in some individuals.    Remember to Drive Safe... Drive Alive     Sleep health profoundly affects your health, mood, and your safety.  Thirty three percent of the population (one in three of us) is not getting enough sleep and many have a sleep disorder. Not getting enough sleep or having an untreated / undertreated sleep condition may make us sleepy without even knowing it. In fact, our driving could be dramatically impaired due to our sleep health. As your provider, here are some things I would like you to know about driving:     Here are some warning signs for impairment and dangerous drowsy driving:              -Having been awake more than 16 hours                -Looking tired               -Eyelid drooping              -Head nodding (it could be too late at this point)              -Driving for more than 30 minutes     Some things you could do to make the driving safer if you are experiencing some drowsiness:              -Stop driving and rest              -Call for transportation              -Make sure your sleep disorder is adequately treated     Some things that have been shown NOT to work when experiencing drowsiness while driving:              -Turning on the radio              -Opening windows              -Eating any  distracting  /  entertaining  foods (e.g., sunflower seeds, candy, or any other)              -Talking on the phone      Your decision may not only impact your life, but also the life of others. Please, remember to drive safe for yourself and all of us.

## 2022-07-05 NOTE — PROGRESS NOTES
Miguel Ángel is a 31 year old who is being evaluated via a billable video visit.      How would you like to obtain your AVS? MyChart  If the video visit is dropped, the invitation should be resent by: Send to e-mail at: [email or web address]  Will anyone else be joining your video visit? Trina   Michelle Jackson FABBY        Video-Visit Details    Video Start Time: 1:41 PM    Type of service:  Video Visit    Video End Time:  1:57 PM    Originating Location (pt. Location): Home    Distant Location (provider location):  [facility]     Platform used for Video Visit: [facility]         Outpatient Sleep Medicine Consultation:      Name: Miguel Ángel Wilson MRN# [id number]   Age: 31 year old YOB: 1990     Date of Consultation: July 5, 2022  Consultation is requested by: Douglas Camacho DO at [facility]  Primary care provider: Trina Ref-Primary, Physician       Reason for Sleep Consult:     Miguel Ángel Wilson is sent by Douglas Camacho DO, for a sleep consultation regarding snoring, possible sleep disordered breathing in the setting of new onset atrial fib/flutter, chronic diastolic heart failure, and hypertension.    Patient s Reason for visit  Miguel Ángel Wilson main reason for visit: Snoring  Patient states problem(s) started:    Miguel Ángel Steve's goals for this visit:             Assessment and Plan:     Summary Sleep Diagnoses:  1. Sleep disturbance  2. Risk factors for obstructive sleep apnea  3. Snoring  4. Essential hypertension  5. Atrial flutter with rapid ventricular response (H)  6. Heart failure with reduced ejection fraction, NYHA class I (H)  7. Obesity (BMI 30-39.9)  - Adult Sleep Eval & Management  Referral  - Comprehensive Sleep Study; Future      Comorbid Diagnoses:  1.  HTN  2.  Chronic diastolic heart failure (EF 30-35%)  3.  Atrial fibrillation/flutter  4.  Nicotine dependence  5.  Obesity      Summary Recommendations:  1.  Recommend further evaluation with in lab polysomnography  for possible sleep disordered breathing in the context of chronic heart failure, atrial fibrillation/flutter, hypertension, and obesity.  His STOP-BANG score today is 5 which suggests a high risk of PARVEEN.  2.  Follow-up in approximately 2 weeks after completing sleep study to review the results and determine next steps.    Orders Placed This Encounter   Procedures     Comprehensive Sleep Study         Summary Counseling:    Sleep Testing Reviewed  Obstructive Sleep Apnea Reviewed  Complications of Untreated Sleep Apnea Reviewed  Previous recent chart notes, lab, cardiology, and imaging results reviewed    Medical Decision-making:   Educational materials provided in instructions    Total time spent reviewing medical records, history and physical examination, review of previous testing and interpretation as well as documentation on this date: 30 minutes    CC: No ref. provider found          History of Present Illness:     Miguel Ángel Wilson is a 31-year-old male with a PMH pertinent for hypertension, chronic diastolic heart failure (EF 30-35%), atrial flutter/fib, nicotine dependence, and obesity who presents today snoring, daytime somnolence, wakes with dry mouth for the last 5 years.  Patient was referred by his primary care provider for further evaluation of possible sleep disordered breathing in the context of new onset atrial flutter/fib, chronic diastolic heart failure and uncontrolled hypertension.    Past Sleep Evaluations: No    SLEEP-WAKE SCHEDULE:     Work/School Days: Patient goes to school/work:  Not working currently   Usually gets into bed at  12-1 AM, occasionally 4 AM  Takes patient about 7 minutes  to fall asleep  Has trouble falling asleep 0  nights per week  Wakes up in the middle of the night 1  times.  Wakes up due to  bathroom  He has trouble falling back asleep 0  times a week.   It usually takes 5 minutes  to get back to sleep  Patient is usually up at  7 AM  Uses alarm:  No    Weekends/Non-work  Days/All Other Days: Same as above  Usually gets into bed at     Takes patient about   to fall asleep  Patient is usually up at    Uses alarm:      Sleep Need  Patient gets 7-8 hours   sleep on average   Patient thinks he needs about 8 hours  sleep    Miguel Ángel Wilson prefers to sleep in this position(s):   Side  Patient states they do the following activities in bed:  Watches TV in bed until dozes off    Naps  Patient takes a purposeful nap 0  times a week and naps are usually 0  in duration  He feels better after a nap:    He dozes off unintentionally 0  days per week  Patient has had a driving accident or near-miss due to sleepiness/drowsiness:  No      SLEEP DISRUPTIONS:    Breathing/Snoring  Patient snores: Snoring, loud  Other people complain about his snoring:  Yes  Patient has been told he stops breathing in his sleep:  No  He has issues with the following:  Denies difficulty breathing through nose    Movement:  Patient gets pain, discomfort, with an urge to move:   No  It happens when he is resting:     It happens more at night:     Patient has been told he kicks his legs at night:   No     Behaviors in Sleep:  Miguel Ángel Wilson has experienced the following behaviors while sleeping:  Denies walking/talking in sleep, bruxism, hypnagogy, sleep paralysis  He has experienced sudden muscle weakness during the day:  Denies cataplexy      Is there anything else you would like your sleep provider to know:  No      CAFFEINE AND OTHER SUBSTANCES:    Patient consumes caffeinated beverages per day:   3-4  Last caffeine use is usually:  Up until bedtime  List of any prescribed or over the counter stimulants that patient takes:  No  List of any prescribed or over the counter sleep medication patient takes:  No  List of previous sleep medications that patient has tried:  No  Patient drinks alcohol to help them sleep:  No  Patient drinks alcohol near bedtime:  No    Alcohol use: Occasional  Nicotine/tobacco use: Smokes 1-2  cigarettes/day  Recreational drug use: None      Family History:  Patient has a family member been diagnosed with a sleep disorder:  None known            SCALES:    EPWORTH SLEEPINESS SCALE      [place] Sleepiness Scale ( KASSIDY Sahni  1990-1997[facility] [place]/English - Final version - 21 Nov 07 - [facility] Research [employer].) 7/5/2022   Sitting and reading Would never doze   Watching TV Moderate chance of dozing   Sitting, inactive in a public place (e.g. a theatre or a meeting) Moderate chance of dozing   As a passenger in a car for an hour without a break Slight chance of dozing   Lying down to rest in the afternoon when circumstances permit Would never doze   Sitting and talking to someone Would never doze   Sitting quietly after a lunch without alcohol Would never doze   In a car, while stopped for a few minutes in traffic Would never doze   [place] Score (MC) 5   [place] Score (Sleep) 5         INSOMNIA SEVERITY INDEX (VIDHI)      Insomnia Severity Index (VIDHI) 7/5/2022   Difficulty falling asleep 0   Difficulty staying asleep 0   Problems waking up too early 0   How SATISFIED/DISSATISFIED are you with your CURRENT sleep pattern? 0   How NOTICEABLE to others do you think your sleep problem is in terms of impairing the quality of your life? 4   How WORRIED/DISTRESSED are you about your current sleep problem? 4   To what extent do you consider your sleep problem to INTERFERE with your daily functioning (e.g. daytime fatigue, mood, ability to function at work/daily chores, concentration, memory, mood, etc.) CURRENTLY? 0   VIDHI Total Score 8       Guidelines for Scoring/Interpretation:  Total score categories:  0-7 = No clinically significant insomnia   8-14 = Subthreshold insomnia   15-21 = Clinical insomnia (moderate severity)  22-28 = Clinical insomnia (severe)  Used via courtesy of www.myhealth.va.gov with permission from Wiliam Montelongo PhD., [facility]      STOP BANG score: 5    STOP BANG Questionnaire (  2008, the  "American Society of Anesthesiologists, Inc. [employer] 7/5/2022   B/P Clinic: (No Data)   BMI Clinic: -         GAD7    No flowsheet data found.      CAGE-AID    No flowsheet data found.    CAGE-AID reprinted with permission from the [place] Medical Journal, CRISTHIAN Camacho. and LEWIS Contreras, \"Conjoint screening questionnaires for alcohol and drug abuse\" [place] Medical Journal 94: 135-140, 1995.      PATIENT HEALTH QUESTIONNAIRE-9 (PHQ - 9)    No flowsheet data found.    Developed by Liz Jimenez, Margaret Cuenca, Vik Biggs and colleagues, with an educational ronald from [employer] No permission required to reproduce, translate, display or distribute.        Allergies:    No Known Allergies    Medications:    Current Outpatient Medications   Medication Sig Dispense Refill     acetaminophen (TYLENOL) 500 MG tablet Take 500-1,000 mg by mouth every 6 hours as needed for mild pain       apixaban ANTICOAGULANT (ELIQUIS) 5 MG tablet Take 1 tablet (5 mg) by mouth 2 times daily 60 tablet 0     gabapentin (NEURONTIN) 300 MG capsule Take 1 capsule (300 mg) by mouth 3 times daily 90 capsule 0     lisinopril (ZESTRIL) 40 MG tablet Take 1 tablet (40 mg) by mouth daily 30 tablet 0     metoprolol succinate ER (TOPROL-XL) 25 MG 24 hr tablet Take 1 tablet (25 mg) by mouth daily 30 tablet 0     torsemide (DEMADEX) 20 MG tablet Take 1 tablet (20 mg) by mouth 2 times daily 60 tablet 0       Problem List:  Patient Active Problem List    Diagnosis Date Noted     COVID-19 virus infection 04/20/2022     Priority: Medium     Formatting of this note might be different from the original.  Jan 2022.       Atrial flutter with rapid ventricular response (H) 01/31/2022     Priority: Medium     Essential hypertension 01/31/2022     Priority: Medium     Morbid obesity (H) 01/31/2022     Priority: Medium     Tobacco use 01/31/2022     Priority: Medium     Heart failure with reduced ejection fraction, NYHA " class I (H) 01/22/2022     Priority: Medium     Chronic congestive heart failure (H) 11/28/2021     Priority: Medium        Past Medical/Surgical History:  Past Medical History:   Diagnosis Date     Atrial flutter with rapid ventricular response (H) 1/31/2022     Essential hypertension 1/31/2022     Hypertension      Morbid obesity (H) 1/31/2022     Tobacco use 1/31/2022     No past surgical history on file.    Social History:  Social History     Socioeconomic History     Marital status: Single     Spouse name: Not on file     Number of children: Not on file     Years of education: Not on file     Highest education level: Not on file   Occupational History     Not on file   Tobacco Use     Smoking status: Current Some Day Smoker     Smokeless tobacco: Never Used   Substance and Sexual Activity     Alcohol use: No     Drug use: No     Sexual activity: Not on file   Other Topics Concern     Not on file   Social History Narrative     Not on file     Social Determinants of Health     Financial Resource Strain: Not on file   Food Insecurity: Not on file   Transportation Needs: Not on file   Physical Activity: Not on file   Stress: Not on file   Social Connections: Not on file   Intimate Partner Violence: Not on file   Housing Stability: Not on file       Family History:  No family history on file.    Review of Systems:  A complete review of systems reviewed by me is negative with the exeption of what has been mentioned in the history of present illness within the last 2 WEEKS.  CONSTITUTIONAL: NEGATIVE for weight gain/loss, fever, chills, sweats or night sweats, drug allergies.  EYES: NEGATIVE for changes in vision, blind spots, double vision.  ENT: NEGATIVE for ear pain, sore throat, sinus pain, post-nasal drip, runny nose, bloody nose  CARDIAC: NEGATIVE for chest pain or pressure, breathlessness when lying flat, swollen legs or swollen feet.  CARDIAC:  POSITIVE for  fast heart beats and fluttering in chest  NEUROLOGIC:  "NEGATIVE headaches, weakness or numbness in the arms or legs.  DERMATOLOGIC: NEGATIVE for rashes, new moles or change in mole(s)  PULMONARY: NEGATIVE  productive cough, coughing up blood, wheezing or whistling when breathing.    PULMONARY:  POSITIVE for  SOB at rest, SOB with activity and dry cough  GASTROINTESTINAL: NEGATIVE for nausea or vomitting, loose or watery stools, fat or grease in stools, constipation, abdominal pain, bowel movements black in color or blood noted.  GENITOURINARY: NEGATIVE for pain during urination, blood in urine, urinating more frequently than usual, irregular menstrual periods.  MUSCULOSKELETAL: NEGATIVE for muscle pain, bone or joint pain, swollen joints.  ENDOCRINE: NEGATIVE for increased thirst or urination, diabetes.  LYMPHATIC: NEGATIVE for swollen lymph nodes, lumps or bumps in the breasts or nipple discharge.      Physical Examination:  Vitals: Ht 1.88 m (6' 2\")   BMI 39.67 kg/m    BMI= Body mass index is 39.67 kg/m .           GENERAL APPEARANCE: healthy, alert, cooperative, fatigued and inattentive at times  EYES: Eyes grossly normal to inspection  RESP: Unlabored, easy breathing with normal conversational speech  CV: color normal  NEURO: Normal strength and tone, mentation intact and speech normal  PSYCH: mentation appears normal, affect flattened, patient appearance--shirtless with multiple tattoos observed, fatigued and inattentive  Mallampati Class: Not examined  Tonsillar Stage: Not examined         Data: All pertinent previous laboratory data reviewed     Recent Labs   Lab Test 01/23/22  0700 01/23/22  0002 01/22/22  1740     --  137   POTASSIUM 3.1* 3.5 3.0*   CHLORIDE 95  --  94   CO2 34*  --  35*   ANIONGAP 8  --  8   GLC 92  --  159*   BUN 21  --  20   CR 1.86*  --  1.84*   EFREM 8.2*  --  8.0*       Recent Labs   Lab Test 01/23/22  0700   WBC 7.7   RBC 5.06   HGB 11.2*   HCT 38.9*   MCV 77*   MCH 22.1*   MCHC 28.8*   RDW 15.8*          Recent Labs   Lab " Test 01/21/22 2045   PROTTOTAL 6.8   ALBUMIN 2.6*   BILITOTAL 0.9   ALKPHOS 119   AST 35   ALT 60       TSH (mU/L)   Date Value   01/21/2022 1.71       No results found for: UAMP, UBARB, BENZODIAZEUR, UCANN, UCOC, OPIT, UPCP    No results found for: IRONSAT, [id number], HUSSEIN    No results found for: PH, PHARTERIAL, PO2, [id number], SAT, PCO2, HCO3, BASEEXCESS, JIMMIE, BEB    @LABRCNTIPR(phv:4,pco2v:4,po2v:4,hco3v:4,carmen:4,o2per:4)@    Echocardiology: No results found for this or any previous visit (from the past 4320 hour(s)).  1/22/2022 Echocardiogram Complete  Interpretation Summary:  Left ventricular function is decreased. The ejection fraction is 30-35% (moderately reduced).  Moderate diffuse hypokinesis is present. Mild to moderate concentric wall  thickening consistent with left ventricular hypertrophy is present.  Global right ventricular function is mildly reduced.  No significant valvular abnormalities present.  No pericardial effusion is present.  IVC diameter >2.1 cm collapsing <50% with sniff suggests a high RA pressure  estimated at 15 mmHg or greater.  There is no prior study for direct comparison.      Chest x-ray: XR Chest 2 Views 01/22/2022    Narrative  EXAM: XR CHEST 2 VW  LOCATION: [facility]  DATE/TIME: 1/22/2022 2:25 AM    INDICATION: Swelling and left chest pain.  COMPARISON: None.    Impression  IMPRESSION: Cardiac enlargement. No focal consolidation or pleural effusion.      Chest CT: No results found for this or any previous visit from the past 365 days.      PFT: Most Recent [facility] Pulmonary Function Testing    No results found for: 20001  No results found for: 20002  No results found for: 20003  No results found for: 20015  No results found for: 20016  No results found for: 20027  No results found for: 20028  No results found for: 20029  No results found for: 20079  No results found for: 20080  No results found for: 20081  No results found for:  50446  No results found for: 20105  No results found for: 20053  No results found for: 20054  No results found for: 20055      ASHWINI Nichols CNP 7/5/2022   Sleep Medicine      This note was written with the assistance of the Dragon voice-dictation technology software. The final document, although reviewed, may contain errors. For corrections, please contact the office.

## 2022-08-26 ENCOUNTER — TELEPHONE (OUTPATIENT)
Dept: SLEEP MEDICINE | Facility: CLINIC | Age: 32
End: 2022-08-26

## 2022-08-27 NOTE — TELEPHONE ENCOUNTER
Called and left voicemail for patient at 9:20 pm to let patient know that he had a sleep study scheduled at 8:00 pm at the [facility]. Let him know he can call back if he is interested in rescheduling.

## 2023-04-18 ENCOUNTER — HOSPITAL ENCOUNTER (INPATIENT)
Facility: CLINIC | Age: 33
End: 2023-04-18
Admitting: RADIOLOGY
Payer: COMMERCIAL

## 2023-04-18 ENCOUNTER — HOSPITAL ENCOUNTER (INPATIENT)
Facility: CLINIC | Age: 33
LOS: 7 days | Discharge: HOME | End: 2023-04-25
Attending: EMERGENCY MEDICINE | Admitting: INTERNAL MEDICINE
Payer: COMMERCIAL

## 2023-04-18 ENCOUNTER — APPOINTMENT (OUTPATIENT)
Dept: CT IMAGING | Facility: CLINIC | Age: 33
End: 2023-04-18
Attending: EMERGENCY MEDICINE
Payer: COMMERCIAL

## 2023-04-18 ENCOUNTER — APPOINTMENT (OUTPATIENT)
Dept: INTERVENTIONAL RADIOLOGY/VASCULAR | Facility: CLINIC | Age: 33
End: 2023-04-18
Payer: COMMERCIAL

## 2023-04-18 DIAGNOSIS — T86.19: ICD-10-CM

## 2023-04-18 DIAGNOSIS — I72.2 RENAL ARTERY PSEUDOANEURYSM (H): ICD-10-CM

## 2023-04-18 DIAGNOSIS — I16.1 HYPERTENSIVE EMERGENCY WITHOUT CONGESTIVE HEART FAILURE: ICD-10-CM

## 2023-04-18 DIAGNOSIS — Z79.01 ANTICOAGULATED: ICD-10-CM

## 2023-04-18 DIAGNOSIS — I16.1 HYPERTENSIVE EMERGENCY: ICD-10-CM

## 2023-04-18 DIAGNOSIS — S37.019A SUBCAPSULAR HEMATOMA OF KIDNEY TRANSPLANT: ICD-10-CM

## 2023-04-18 DIAGNOSIS — I50.20 HEART FAILURE WITH REDUCED EJECTION FRACTION, NYHA CLASS I (H): Primary | ICD-10-CM

## 2023-04-18 DIAGNOSIS — I10 ESSENTIAL HYPERTENSION: Primary | ICD-10-CM

## 2023-04-18 DIAGNOSIS — Z11.52 ENCOUNTER FOR SCREENING LABORATORY TESTING FOR SEVERE ACUTE RESPIRATORY SYNDROME CORONAVIRUS 2 (SARS-COV-2): ICD-10-CM

## 2023-04-18 DIAGNOSIS — R52 ACUTE PAIN: ICD-10-CM

## 2023-04-18 DIAGNOSIS — Z79.01 LONG TERM (CURRENT) USE OF ANTICOAGULANTS: ICD-10-CM

## 2023-04-18 DIAGNOSIS — T86.19 SUBCAPSULAR HEMATOMA OF KIDNEY TRANSPLANT: ICD-10-CM
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
## 2023-04-18 DIAGNOSIS — S37.019A KIDNEY HEMATOMA, UNSPECIFIED LATERALITY, INITIAL ENCOUNTER: ICD-10-CM

## 2023-04-18 DIAGNOSIS — I50.20 HEART FAILURE WITH REDUCED EJECTION FRACTION, NYHA CLASS I (H): ICD-10-CM

## 2023-04-18 LAB
ABO/RH(D): NORMAL
ALBUMIN SERPL BCG-MCNC: 4.2 G/DL (ref 3.5–5.2)
ALBUMIN UR-MCNC: 20 MG/DL
ALP SERPL-CCNC: 120 U/L (ref 40–129)
ALT SERPL W P-5'-P-CCNC: 21 U/L (ref 10–50)
ANION GAP SERPL CALCULATED.3IONS-SCNC: 12 MMOL/L (ref 7–15)
ANTIBODY SCREEN: NEGATIVE
APPEARANCE UR: CLEAR
AST SERPL W P-5'-P-CCNC: 22 U/L (ref 10–50)
ATRIAL RATE - MUSE: 91 BPM
BASOPHILS # BLD AUTO: 0 10E3/UL (ref 0–0.2)
BASOPHILS NFR BLD AUTO: 0 %
BILIRUB SERPL-MCNC: 0.9 MG/DL
BILIRUB UR QL STRIP: NEGATIVE
BUN SERPL-MCNC: 11.1 MG/DL (ref 6–20)
CALCIUM SERPL-MCNC: 9.3 MG/DL (ref 8.6–10)
CHLORIDE SERPL-SCNC: 97 MMOL/L (ref 98–107)
COLOR UR AUTO: ABNORMAL
CREAT SERPL-MCNC: 1.28 MG/DL (ref 0.67–1.17)
DEPRECATED HCO3 PLAS-SCNC: 27 MMOL/L (ref 22–29)
DIASTOLIC BLOOD PRESSURE - MUSE: NORMAL MMHG
EOSINOPHIL # BLD AUTO: 0 10E3/UL (ref 0–0.7)
EOSINOPHIL NFR BLD AUTO: 0 %
ERYTHROCYTE [DISTWIDTH] IN BLOOD BY AUTOMATED COUNT: 15.3 % (ref 10–15)
GFR SERPL CREATININE-BSD FRML MDRD: 77 ML/MIN/1.73M2
GLUCOSE SERPL-MCNC: 98 MG/DL (ref 70–99)
GLUCOSE UR STRIP-MCNC: NEGATIVE MG/DL
HCT VFR BLD AUTO: 40 % (ref 40–53)
HGB BLD-MCNC: 12.2 G/DL (ref 13.3–17.7)
HGB UR QL STRIP: NEGATIVE
IMM GRANULOCYTES # BLD: 0 10E3/UL
IMM GRANULOCYTES NFR BLD: 0 %
INTERPRETATION ECG - MUSE: NORMAL
KETONES UR STRIP-MCNC: NEGATIVE MG/DL
LEUKOCYTE ESTERASE UR QL STRIP: NEGATIVE
LYMPHOCYTES # BLD AUTO: 0.9 10E3/UL (ref 0.8–5.3)
LYMPHOCYTES NFR BLD AUTO: 8 %
MCH RBC QN AUTO: 24.4 PG (ref 26.5–33)
MCHC RBC AUTO-ENTMCNC: 30.5 G/DL (ref 31.5–36.5)
MCV RBC AUTO: 80 FL (ref 78–100)
MONOCYTES # BLD AUTO: 0.7 10E3/UL (ref 0–1.3)
MONOCYTES NFR BLD AUTO: 6 %
MUCOUS THREADS #/AREA URNS LPF: PRESENT /LPF
NEUTROPHILS # BLD AUTO: 9.1 10E3/UL (ref 1.6–8.3)
NEUTROPHILS NFR BLD AUTO: 86 %
NITRATE UR QL: NEGATIVE
NRBC # BLD AUTO: 0 10E3/UL
NRBC BLD AUTO-RTO: 0 /100
NT-PROBNP SERPL-MCNC: 2256 PG/ML (ref 0–450)
P AXIS - MUSE: 50 DEGREES
PH UR STRIP: 6.5 [PH] (ref 5–7)
PLATELET # BLD AUTO: 259 10E3/UL (ref 150–450)
POTASSIUM SERPL-SCNC: 3.4 MMOL/L (ref 3.4–5.3)
PR INTERVAL - MUSE: 186 MS
PROT SERPL-MCNC: 7.3 G/DL (ref 6.4–8.3)
QRS DURATION - MUSE: 94 MS
QT - MUSE: 392 MS
QTC - MUSE: 482 MS
R AXIS - MUSE: 35 DEGREES
RADIOLOGIST FLAGS: ABNORMAL
RBC # BLD AUTO: 5 10E6/UL (ref 4.4–5.9)
RBC URINE: 1 /HPF
SODIUM SERPL-SCNC: 136 MMOL/L (ref 136–145)
SP GR UR STRIP: 1.01 (ref 1–1.03)
SPECIMEN EXPIRATION DATE: NORMAL
SYSTOLIC BLOOD PRESSURE - MUSE: NORMAL MMHG
T AXIS - MUSE: 163 DEGREES
TROPONIN T SERPL HS-MCNC: 26 NG/L
UROBILINOGEN UR STRIP-MCNC: NORMAL MG/DL
VENTRICULAR RATE- MUSE: 91 BPM
WBC # BLD AUTO: 10.8 10E3/UL (ref 4–11)
WBC URINE: <1 /HPF

## 2023-04-18 PROCEDURE — 37242 VASC EMBOLIZE/OCCLUDE ARTERY: CPT | Mod: GC | Performed by: RADIOLOGY

## 2023-04-18 PROCEDURE — 81001 URINALYSIS AUTO W/SCOPE: CPT | Performed by: EMERGENCY MEDICINE

## 2023-04-18 PROCEDURE — C1769 GUIDE WIRE: HCPCS

## 2023-04-18 PROCEDURE — 99291 CRITICAL CARE FIRST HOUR: CPT | Mod: 25 | Performed by: EMERGENCY MEDICINE

## 2023-04-18 PROCEDURE — 96375 TX/PRO/DX INJ NEW DRUG ADDON: CPT | Performed by: EMERGENCY MEDICINE

## 2023-04-18 PROCEDURE — 36415 COLL VENOUS BLD VENIPUNCTURE: CPT | Performed by: EMERGENCY MEDICINE

## 2023-04-18 PROCEDURE — 37244 VASC EMBOLIZE/OCCLUDE BLEED: CPT

## 2023-04-18 PROCEDURE — 74176 CT ABD & PELVIS W/O CONTRAST: CPT

## 2023-04-18 PROCEDURE — 99152 MOD SED SAME PHYS/QHP 5/>YRS: CPT | Mod: GC | Performed by: RADIOLOGY

## 2023-04-18 PROCEDURE — 76937 US GUIDE VASCULAR ACCESS: CPT

## 2023-04-18 PROCEDURE — 85025 COMPLETE CBC W/AUTO DIFF WBC: CPT | Performed by: EMERGENCY MEDICINE

## 2023-04-18 PROCEDURE — 36253 INS CATH REN ART 2ND+ UNILAT: CPT | Mod: XU | Performed by: RADIOLOGY

## 2023-04-18 PROCEDURE — 250N000011 HC RX IP 250 OP 636: Performed by: EMERGENCY MEDICINE

## 2023-04-18 PROCEDURE — 250N000011 HC RX IP 250 OP 636: Performed by: RADIOLOGY

## 2023-04-18 PROCEDURE — 93005 ELECTROCARDIOGRAM TRACING: CPT | Performed by: EMERGENCY MEDICINE

## 2023-04-18 PROCEDURE — 84484 ASSAY OF TROPONIN QUANT: CPT | Performed by: EMERGENCY MEDICINE

## 2023-04-18 PROCEDURE — 200N000002 HC R&B ICU UMMC

## 2023-04-18 PROCEDURE — 96374 THER/PROPH/DIAG INJ IV PUSH: CPT | Mod: 59 | Performed by: EMERGENCY MEDICINE

## 2023-04-18 PROCEDURE — 99152 MOD SED SAME PHYS/QHP 5/>YRS: CPT

## 2023-04-18 PROCEDURE — 96361 HYDRATE IV INFUSION ADD-ON: CPT | Performed by: EMERGENCY MEDICINE

## 2023-04-18 PROCEDURE — 83880 ASSAY OF NATRIURETIC PEPTIDE: CPT | Performed by: EMERGENCY MEDICINE

## 2023-04-18 PROCEDURE — 99292 CRITICAL CARE ADDL 30 MIN: CPT | Performed by: EMERGENCY MEDICINE

## 2023-04-18 PROCEDURE — 96376 TX/PRO/DX INJ SAME DRUG ADON: CPT | Performed by: EMERGENCY MEDICINE

## 2023-04-18 PROCEDURE — C9803 HOPD COVID-19 SPEC COLLECT: HCPCS | Performed by: EMERGENCY MEDICINE

## 2023-04-18 PROCEDURE — 36251 INS CATH REN ART 1ST UNILAT: CPT

## 2023-04-18 PROCEDURE — 80053 COMPREHEN METABOLIC PANEL: CPT | Performed by: EMERGENCY MEDICINE

## 2023-04-18 PROCEDURE — 258N000003 HC RX IP 258 OP 636: Performed by: EMERGENCY MEDICINE

## 2023-04-18 PROCEDURE — 74177 CT ABD & PELVIS W/CONTRAST: CPT

## 2023-04-18 PROCEDURE — 76937 US GUIDE VASCULAR ACCESS: CPT | Mod: 26 | Performed by: RADIOLOGY

## 2023-04-18 PROCEDURE — 93010 ELECTROCARDIOGRAM REPORT: CPT | Performed by: EMERGENCY MEDICINE

## 2023-04-18 PROCEDURE — 99254 IP/OBS CNSLTJ NEW/EST MOD 60: CPT | Performed by: UROLOGY

## 2023-04-18 PROCEDURE — 250N000009 HC RX 250: Performed by: EMERGENCY MEDICINE

## 2023-04-18 RX ORDER — NALOXONE HYDROCHLORIDE 0.4 MG/ML
0.2 INJECTION, SOLUTION INTRAMUSCULAR; INTRAVENOUS; SUBCUTANEOUS
Status: DISCONTINUED | OUTPATIENT
Start: 2023-04-18 | End: 2023-04-19 | Stop reason: HOSPADM

## 2023-04-18 RX ORDER — FLUMAZENIL 0.1 MG/ML
0.2 INJECTION, SOLUTION INTRAVENOUS
Status: DISCONTINUED | OUTPATIENT
Start: 2023-04-18 | End: 2023-04-19 | Stop reason: HOSPADM

## 2023-04-18 RX ORDER — NALOXONE HYDROCHLORIDE 0.4 MG/ML
0.4 INJECTION, SOLUTION INTRAMUSCULAR; INTRAVENOUS; SUBCUTANEOUS
Status: DISCONTINUED | OUTPATIENT
Start: 2023-04-18 | End: 2023-04-19 | Stop reason: HOSPADM

## 2023-04-18 RX ORDER — IOPAMIDOL 755 MG/ML
100 INJECTION, SOLUTION INTRAVASCULAR ONCE
Status: COMPLETED | OUTPATIENT
Start: 2023-04-18 | End: 2023-04-18

## 2023-04-18 RX ORDER — SODIUM CHLORIDE 9 MG/ML
INJECTION, SOLUTION INTRAVENOUS CONTINUOUS
Status: DISCONTINUED | OUTPATIENT
Start: 2023-04-18 | End: 2023-04-19

## 2023-04-18 RX ORDER — HYDRALAZINE HYDROCHLORIDE 20 MG/ML
10 INJECTION INTRAMUSCULAR; INTRAVENOUS ONCE
Status: COMPLETED | OUTPATIENT
Start: 2023-04-18 | End: 2023-04-18

## 2023-04-18 RX ORDER — ONDANSETRON 2 MG/ML
4 INJECTION INTRAMUSCULAR; INTRAVENOUS ONCE
Status: COMPLETED | OUTPATIENT
Start: 2023-04-18 | End: 2023-04-18

## 2023-04-18 RX ORDER — HYDROMORPHONE HYDROCHLORIDE 1 MG/ML
0.5 INJECTION, SOLUTION INTRAMUSCULAR; INTRAVENOUS; SUBCUTANEOUS ONCE
Status: COMPLETED | OUTPATIENT
Start: 2023-04-18 | End: 2023-04-18

## 2023-04-18 RX ORDER — FENTANYL CITRATE 50 UG/ML
25-50 INJECTION, SOLUTION INTRAMUSCULAR; INTRAVENOUS EVERY 5 MIN PRN
Status: DISCONTINUED | OUTPATIENT
Start: 2023-04-18 | End: 2023-04-19 | Stop reason: HOSPADM

## 2023-04-18 RX ADMIN — SODIUM CHLORIDE 85 ML: 9 INJECTION, SOLUTION INTRAVENOUS at 20:45

## 2023-04-18 RX ADMIN — MIDAZOLAM 1 MG: 1 INJECTION INTRAMUSCULAR; INTRAVENOUS at 22:56

## 2023-04-18 RX ADMIN — SODIUM CHLORIDE 1000 ML: 9 INJECTION, SOLUTION INTRAVENOUS at 17:32

## 2023-04-18 RX ADMIN — PROTHROMBIN, COAGULATION FACTOR VII HUMAN, COAGULATION FACTOR IX HUMAN, COAGULATION FACTOR X HUMAN, PROTEIN C, PROTEIN S HUMAN, AND WATER 4355 UNITS: KIT at 22:17

## 2023-04-18 RX ADMIN — ONDANSETRON 4 MG: 2 INJECTION INTRAMUSCULAR; INTRAVENOUS at 17:30

## 2023-04-18 RX ADMIN — IOPAMIDOL 135 ML: 755 INJECTION, SOLUTION INTRAVENOUS at 20:45

## 2023-04-18 RX ADMIN — HYDROMORPHONE HYDROCHLORIDE 1 MG: 1 INJECTION, SOLUTION INTRAMUSCULAR; INTRAVENOUS; SUBCUTANEOUS at 17:31

## 2023-04-18 RX ADMIN — HYDRALAZINE HYDROCHLORIDE 10 MG: 20 INJECTION INTRAMUSCULAR; INTRAVENOUS at 20:56

## 2023-04-18 RX ADMIN — SODIUM CHLORIDE 85 ML: 9 INJECTION, SOLUTION INTRAVENOUS at 20:46

## 2023-04-18 RX ADMIN — FENTANYL CITRATE 50 MCG: 50 INJECTION, SOLUTION INTRAMUSCULAR; INTRAVENOUS at 22:56

## 2023-04-18 RX ADMIN — NICARDIPINE HYDROCHLORIDE 0.5 MG/HR: 0.2 INJECTION, SOLUTION INTRAVENOUS at 21:53

## 2023-04-18 RX ADMIN — HYDROMORPHONE HYDROCHLORIDE 0.5 MG: 1 INJECTION, SOLUTION INTRAMUSCULAR; INTRAVENOUS; SUBCUTANEOUS at 21:52

## 2023-04-18 RX ADMIN — HYDRALAZINE HYDROCHLORIDE 10 MG: 20 INJECTION INTRAMUSCULAR; INTRAVENOUS at 18:13

## 2023-04-18 ASSESSMENT — ACTIVITIES OF DAILY LIVING (ADL)
ADLS_ACUITY_SCORE: 35
ADLS_ACUITY_SCORE: 33

## 2023-04-18 NOTE — ED PROVIDER NOTES
ED Provider Note  [facility]      History     Chief Complaint   Patient presents with     Flank Pain     L flank pain since this morning. Denies dysuria, hematuria.      ROYA Wilson is a 32 year old male who has a quite complex cardiovascular history significant for longstanding hypertension, atrial fibrillation, chronic kidney disease, congestive heart failure.  He arrives today to the emergency department for evaluation of left flank pain x1 day.  Patient reports onset of symptoms last night.  This was initially mild however today significant worsened.  The patient reports worsening of symptoms when walking upstairs.  The patient got to the top of 10 stairs and nearly collapsed per patient.  He states he was sweaty and developed left flank pain rated at moderate to severe in intensity.  He did apply external cream and take ibuprofen without relief.  Patient reports no similar symptoms in the past.  He reports no trauma.  He denies chest pain or shortness of breath.  Patient reports no nausea, vomiting.  He did attempt to defecate and reports he was constipated.  He denies history of nephrolithiasis, pyelonephritis, dysuria, or frequency or hematuria.    Past Medical History  Past Medical History:   Diagnosis Date     Atrial flutter with rapid ventricular response (H) 1/31/2022     Essential hypertension 1/31/2022     Hypertension      Morbid obesity (H) 1/31/2022     Tobacco use 1/31/2022     History reviewed. No pertinent surgical history.  apixaban ANTICOAGULANT (ELIQUIS) 5 MG tablet  lisinopril (ZESTRIL) 40 MG tablet  metoprolol succinate ER (TOPROL-XL) 25 MG 24 hr tablet  torsemide (DEMADEX) 20 MG tablet  acetaminophen (TYLENOL) 500 MG tablet  gabapentin (NEURONTIN) 300 MG capsule      No Known Allergies  Family History  History reviewed. No pertinent family history.  Social History   Social History     Tobacco Use     Smoking status: Some Days     Smokeless tobacco: Never  "  Substance Use Topics     Alcohol use: No     Drug use: No         A medically appropriate review of systems was performed with pertinent positives and negatives noted in the HPI, and all other systems negative.    Physical Exam   BP: (!) 200/154  Pulse: 104  Temp: 98.3  F (36.8  C)  Resp: 18  Height: 188 cm (6' 2\")  Weight:  (unwilling to stand)  SpO2: 99 %  Physical Exam  Vitals and nursing note reviewed.   Constitutional:       General: He is in acute distress.      Appearance: Normal appearance. He is not ill-appearing, toxic-appearing or diaphoretic.   HENT:      Head: Normocephalic and atraumatic.      Right Ear: External ear normal.      Left Ear: External ear normal.      Nose: Nose normal. No congestion.      Mouth/Throat:      Mouth: Mucous membranes are moist.      Pharynx: Oropharynx is clear. No oropharyngeal exudate.   Eyes:      Extraocular Movements: Extraocular movements intact.      Conjunctiva/sclera: Conjunctivae normal.      Pupils: Pupils are equal, round, and reactive to light.   Cardiovascular:      Rate and Rhythm: Tachycardia present.      Pulses: Normal pulses.      Heart sounds: Normal heart sounds. No murmur heard.     No friction rub.   Pulmonary:      Effort: Pulmonary effort is normal. No respiratory distress.      Breath sounds: No stridor. No wheezing, rhonchi or rales.   Abdominal:      General: Abdomen is flat. There is no distension.      Tenderness: There is abdominal tenderness. There is no guarding or rebound.          Comments: Tenderness with palpation no erythema, rash, fluctuance or induration.   Musculoskeletal:         General: No deformity or signs of injury. Normal range of motion.      Cervical back: Normal range of motion.   Skin:     General: Skin is warm.      Capillary Refill: Capillary refill takes less than 2 seconds.      Coloration: Skin is not pale.      Findings: No bruising or erythema.   Neurological:      General: No focal deficit present.      Mental " Status: He is alert and oriented to person, place, and time.      Cranial Nerves: No cranial nerve deficit.      Motor: No weakness.   Psychiatric:         Mood and Affect: Mood normal.         Behavior: Behavior normal.         ED Course, Procedures, & Data      Procedures                Results for orders placed or performed during the hospital encounter of 04/18/23   Abd/pelvis CT no contrast - Stone Protocol     Status: None    Narrative    EXAM: CT ABDOMEN PELVIS W/O CONTRAST  LOCATION: [facility]  DATE/TIME: 4/18/2023 7:37 PM CDT    INDICATION: L flank pain  COMPARISON: None.  TECHNIQUE: CT scan of the abdomen and pelvis was performed without IV contrast. Multiplanar reformats were obtained. Dose reduction techniques were used.  CONTRAST: None.    FINDINGS:   LOWER CHEST: Mosaic attenuation at the lung bases to small airway disease.    HEPATOBILIARY: Normal.    PANCREAS: Normal.    SPLEEN: Normal.    ADRENAL GLANDS: Normal.    KIDNEYS/BLADDER: Right kidney within normal limits. There is a left subcapsular hematoma measuring approximately 9 x 4.5 cm which has mass effect upon the left kidney. Surrounding hemorrhage is noted in the anterior left perirenal space. No   hydronephrosis. No stones are identified.    BOWEL: No obstruction or inflammatory change.    LYMPH NODES: Normal.    VASCULATURE: Unremarkable.    PELVIC ORGANS: Bladder within normal limits.    MUSCULOSKELETAL: Normal.      Impression    IMPRESSION:   1.  Large left subcapsular hematoma measuring approximately 9 x 4.5 cm which has mass effect upon the left kidney. Surrounding hemorrhage is noted in the anterior left perirenal space. No hydronephrosis. Can not evaluate for active extravasation due to   lack of intravenous contrast.    FIndings were discussed with Dr. Doran at 8:10 pm on 4/16/2023.        CT Abdomen Pelvis w Contrast     Status: Abnormal   Result Value Ref Range    Radiologist flags (AA)       Active extravasation from a vessel or major vascular injury    Narrative    EXAM: CT ABDOMEN PELVIS W CONTRAST  LOCATION: [facility]  DATE/TIME: 4/18/2023 8:52 PM CDT    INDICATION: Severe L flank pain  COMPARISON: Same date noncontrast CT.  TECHNIQUE: CT scan of the abdomen and pelvis was performed following injection of IV contrast. Multiplanar reformats were obtained. Dose reduction techniques were used.  CONTRAST: 135 mL Isovue 370    FINDINGS:   LOWER CHEST: Unremarkable.    HEPATOBILIARY: Normal.    PANCREAS: Normal.    SPLEEN: Normal.    ADRENAL GLANDS: Normal.    RIGHT KIDNEY: No hydronephrosis. 1.8 cm relatively hypodense lesion in the anterior lower pole with questionable enhancement in comparison to same day noncontrast CT (approximately 20 Hounsfield units).    LEFT KIDNEY: No hydronephrosis. Large subcapsular hematoma again noted with significant compression of the renal parenchyma. There is likely segmental infarction of a portion of the lower pole with relative hypoenhancement of additional areas of the   renal cortex. There is a likely pseudoaneurysm which follows the blood pool in the medial right upper pole measuring up to 2.3 cm (series 5 image 68). There is an additional focus of active hemorrhage extending from the lower pole into the subcapsular   hematoma which expands on delayed images. No suspicious enhancing renal mass. There are small volume blood products outside of the subcapsular hematoma extending into the retroperitoneum, although there is no evidence of active hemorrhage in this space   during the exam.    URINARY BLADDER: Partially decompressed but otherwise unremarkable.    BOWEL: No obstruction or inflammatory change. Normal appendix.    LYMPH NODES: No suspicious lymphadenopathy.    VASCULATURE: Visualized portions of the left renal artery are patent without evidence of aneurysmal dilation or dissection. No significant  atherosclerosis visualized.    PELVIC ORGANS: Normal.    MUSCULOSKELETAL: No acute bony abnormality.      Impression    IMPRESSION:   1.  Large left subcapsular hematoma with compression of the renal parenchyma and likely resultant segmental infarction in the left lower pole.  2.  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 Recommend consultation with   interventional radiology.  3.  Right renal lesion measuring 1.8 cm with questionable low-level enhancement, consider further evaluation with MRI on a nonemergent basis.      [Critical Result: Active extravasation from a vessel or major vascular injury]    Finding was identified on 4/18/2023 9:04 PM CDT.     Dr. Doran was contacted by me on 4/18/2023 9:11 PM CDT and verbalized understanding of the critical result.     Comprehensive metabolic panel     Status: Abnormal   Result Value Ref Range    Sodium 136 136 - 145 mmol/L    Potassium 3.4 3.4 - 5.3 mmol/L    Chloride 97 (L) 98 - 107 mmol/L    Carbon Dioxide (CO2) 27 22 - 29 mmol/L    Anion Gap 12 7 - 15 mmol/L    Urea Nitrogen 11.1 6.0 - 20.0 mg/dL    Creatinine 1.28 (H) 0.67 - 1.17 mg/dL    Calcium 9.3 8.6 - 10.0 mg/dL    Glucose 98 70 - 99 mg/dL    Alkaline Phosphatase 120 40 - 129 U/L    AST 22 10 - 50 U/L    ALT 21 10 - 50 U/L    Protein Total 7.3 6.4 - 8.3 g/dL    Albumin 4.2 3.5 - 5.2 g/dL    Bilirubin Total 0.9 <=1.2 mg/dL    GFR Estimate 77 >60 mL/min/1.73m2   UA with Microscopic reflex to Culture     Status: Abnormal    Specimen: Urine, Midstream   Result Value Ref Range    Color Urine Light Yellow Colorless, Straw, Light Yellow, Yellow    Appearance Urine Clear Clear    Glucose Urine Negative Negative mg/dL    Bilirubin Urine Negative Negative    Ketones Urine Negative Negative mg/dL    Specific Gravity Urine 1.011 1.003 - 1.035    Blood Urine  Negative Negative    pH Urine 6.5 5.0 - 7.0    Protein Albumin Urine 20 (A) Negative mg/dL    Urobilinogen Urine Normal Normal, 2.0 mg/dL    Nitrite Urine Negative Negative    Leukocyte Esterase Urine Negative Negative    Mucus Urine Present (A) None Seen /LPF    RBC Urine 1 <=2 /HPF    WBC Urine <1 <=5 /HPF    Narrative    Urine Culture not indicated   Troponin T, High Sensitivity     Status: Abnormal   Result Value Ref Range    Troponin T, High Sensitivity 26 (H) <=22 ng/L   CBC with platelets and differential     Status: Abnormal   Result Value Ref Range    WBC Count 10.8 4.0 - 11.0 10e3/uL    RBC Count 5.00 4.40 - 5.90 10e6/uL    Hemoglobin 12.2 (L) 13.3 - 17.7 g/dL    Hematocrit 40.0 40.0 - 53.0 %    MCV 80 78 - 100 fL    MCH 24.4 (L) 26.5 - 33.0 pg    MCHC 30.5 (L) 31.5 - 36.5 g/dL    RDW 15.3 (H) 10.0 - 15.0 %    Platelet Count 259 150 - 450 10e3/uL    % Neutrophils 86 %    % Lymphocytes 8 %    % Monocytes 6 %    % Eosinophils 0 %    % Basophils 0 %    % Immature Granulocytes 0 %    NRBCs per 100 WBC 0 <1 /100    Absolute Neutrophils 9.1 (H) 1.6 - 8.3 10e3/uL    Absolute Lymphocytes 0.9 0.8 - 5.3 10e3/uL    Absolute Monocytes 0.7 0.0 - 1.3 10e3/uL    Absolute Eosinophils 0.0 0.0 - 0.7 10e3/uL    Absolute Basophils 0.0 0.0 - 0.2 10e3/uL    Absolute Immature Granulocytes 0.0 <=0.4 10e3/uL    Absolute NRBCs 0.0 10e3/uL   EKG 12-lead, tracing only     Status: None   Result Value Ref Range    Systolic Blood Pressure  mmHg    Diastolic Blood Pressure  mmHg    Ventricular Rate 91 BPM    Atrial Rate 91 BPM    NE Interval 186 ms    QRS Duration 94 ms     ms    QTc 482 ms    P Axis 50 degrees    R AXIS 35 degrees    T Axis 163 degrees    Interpretation ECG       Sinus rhythm  Biatrial enlargement  Left ventricular hypertrophy  ST & T wave abnormality, consider inferolateral ischemia  Prolonged QT  Abnormal ECG  Unconfirmed report - interpretation of this ECG is computer generated - see medical record for final  interpretation  Confirmed by - EMERGENCY ROOM, PHYSICIAN ([id number]),  MAYO HIGGINS ([id number]) on 4/18/2023 6:23:13 PM     CBC with platelets differential     Status: Abnormal    Narrative    The following orders were created for panel order CBC with platelets differential.  Procedure                               Abnormality         Status                     ---------                               -----------         ------                     CBC with platelets and d...[[id number]]  Abnormal            Final result                 Please view results for these tests on the individual orders.     Medications   0.9% sodium chloride BOLUS (0 mLs Intravenous Stopped 4/18/23 2012)     Followed by   sodium chloride 0.9% infusion (85 mLs Intravenous $New Bag 4/18/23 2046)   niCARdipine 40 mg in 200 mL NS (CARDENE) infusion (2.5 mg/hr Intravenous Rate/Dose Change 4/18/23 2156)   prothrombin 4 factor complex concentrate (KCENTRA) infusion 4,355 Units (has no administration in time range)   HYDROmorphone (DILAUDID) injection 1 mg (1 mg Intravenous $Given 4/18/23 1731)   ondansetron (ZOFRAN) injection 4 mg (4 mg Intravenous $Given 4/18/23 1730)   hydrALAZINE (APRESOLINE) injection 10 mg (10 mg Intravenous $Given 4/18/23 1813)   hydrALAZINE (APRESOLINE) injection 10 mg (10 mg Intravenous $Given 4/18/23 2056)   iopamidol (ISOVUE-370) solution 100 mL (135 mLs Intravenous $Given 4/18/23 2045)   sodium chloride 0.9 % bag 100mL for CT scan flush use (85 mLs Intravenous $Given 4/18/23 2045)   HYDROmorphone (PF) (DILAUDID) injection 0.5 mg (0.5 mg Intravenous $Given 4/18/23 2152)     Labs Ordered and Resulted from Time of ED Arrival to Time of ED Departure   COMPREHENSIVE METABOLIC PANEL - Abnormal       Result Value    Sodium 136      Potassium 3.4      Chloride 97 (*)     Carbon Dioxide (CO2) 27      Anion Gap 12      Urea Nitrogen 11.1      Creatinine 1.28 (*)     Calcium 9.3      Glucose 98      Alkaline Phosphatase 120       AST 22      ALT 21      Protein Total 7.3      Albumin 4.2      Bilirubin Total 0.9      GFR Estimate 77     ROUTINE UA WITH MICROSCOPIC REFLEX TO CULTURE - Abnormal    Color Urine Light Yellow      Appearance Urine Clear      Glucose Urine Negative      Bilirubin Urine Negative      Ketones Urine Negative      Specific Gravity Urine 1.011      Blood Urine Negative      pH Urine 6.5      Protein Albumin Urine 20 (*)     Urobilinogen Urine Normal      Nitrite Urine Negative      Leukocyte Esterase Urine Negative      Mucus Urine Present (*)     RBC Urine 1      WBC Urine <1     TROPONIN T, HIGH SENSITIVITY - Abnormal    Troponin T, High Sensitivity 26 (*)    CBC WITH PLATELETS AND DIFFERENTIAL - Abnormal    WBC Count 10.8      RBC Count 5.00      Hemoglobin 12.2 (*)     Hematocrit 40.0      MCV 80      MCH 24.4 (*)     MCHC 30.5 (*)     RDW 15.3 (*)     Platelet Count 259      % Neutrophils 86      % Lymphocytes 8      % Monocytes 6      % Eosinophils 0      % Basophils 0      % Immature Granulocytes 0      NRBCs per 100 WBC 0      Absolute Neutrophils 9.1 (*)     Absolute Lymphocytes 0.9      Absolute Monocytes 0.7      Absolute Eosinophils 0.0      Absolute Basophils 0.0      Absolute Immature Granulocytes 0.0      Absolute NRBCs 0.0     NT PROBNP INPATIENT   ABO/RH TYPE AND SCREEN     CT Abdomen Pelvis w Contrast   Final Result   Abnormal   IMPRESSION:    1.  Large left subcapsular hematoma with compression of the renal parenchyma and likely resultant segmental infarction in the left lower pole.   2.  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 Recommend consultation with    interventional radiology.   3.  Right renal lesion measuring 1.8 cm with questionable low-level enhancement, consider further evaluation with MRI on a nonemergent basis.          [Critical Result: Active extravasation from a vessel or major vascular injury]      Finding was identified on 4/18/2023 9:04 PM CDT.       Dr. Doran was contacted by me on 4/18/2023 9:11 PM CDT and verbalized understanding of the critical result.        Abd/pelvis CT no contrast - Stone Protocol   Final Result   IMPRESSION:    1.  Large left subcapsular hematoma measuring approximately 9 x 4.5 cm which has mass effect upon the left kidney. Surrounding hemorrhage is noted in the anterior left perirenal space. No hydronephrosis. Can not evaluate for active extravasation due to    lack of intravenous contrast.      FIndings were discussed with Dr. Doran at 8:10 pm on 4/16/2023.            IR Visceral Angiogram    (Results Pending)          Critical care was performed.   Critical Care Addendum  My initial assessment, based on my review of prehospital provider report, review of nursing observations, review of vital signs, focused history, physical exam and review of cardiac rhythm monitor, established a high suspicion that Miguel Ángel Wilson has severe hemorrhage/hypertension on anticoagulation which requires immediate intervention, and therefore he is critically ill.     After the initial assessment, the care team initiated multiple lab tests, initiated IV fluid administration and initiated medication therapy with hydralazine, nicardipine, Kcentra to provide stabilization care. Due to the critical nature of this patient, I reassessed nursing observations, vital signs, physical exam and review of cardiac rhythm monitor multiple times prior to his disposition.     Time also spent performing documentation, discussion with family to obtain medical information for decision making, reviewing test results, discussion with consultants and coordination of care.     Critical care time (excluding teaching time and procedures): 90 minutes.     Assessment & Plan      Miguel Ángel Wilson is a 32 year old male who has a quite complex  cardiovascular history significant for longstanding hypertension, atrial fibrillation, chronic kidney disease, congestive heart failure.  He arrives today to the emergency department for evaluation of left flank pain x1 day.  Upon arrival patient noted be alert.  He is presently afebrile.  Triage blood pressure significantly elevated at 200/154.  I did review his previous outpatient notes as well as prior dismissal summary from January of last year.  At that time he was admitted with a hypertensive urgency.  I suspect this is not his primary problem however definitely could be contributing to symptoms with exertional near syncope and diaphoresis.  I did obtain a stat EKG demonstrating on my read a sinus rhythm with normal rate.  There are nonspecific ST changes.  QT slightly prolonged.  No other interval abnormality appreciated.  I would plan for troponin creatinine for screening of endorgan dysfunction however on repeat blood pressure check this is decreased to 150 without intervention.  Regarding flank pain, differential broad including nephrolithiasis, pyelonephritis, renal artery dissection, constipation, diverticulitis.  Less likely be perforated viscus or volvulus.  Unlikely to be bowel obstruction based on history and lack of intra-abdominal surgeries.  I would plan for urinalysis, laboratory studies, IV access for fluids and analgesia.  We will anticipate imaging of this region.    Despite treatment of pain the patient continues to fairly severe elevation in blood pressure.  Patient now reports no pain after Dilaudid/Toradol however pressures remain back now at 200/150.  He is diaphoretic and I believe requires emergent IV treatment with hydralazine.    I did personally review the CT without contrast myself which appears to demonstrate possible bleeding in the left kidney.  I did call [facility] radiology requesting a stat read.  The radiologist did report what appeared to be a subcapsular hematoma.  I did  place a order for CT with contrast with stat performance of this test.  This does appear to reveal continued extravasation.  I believe the patient to be high risk for active bleeding with hypertension.  He has now had several doses of hydralazine but remains hypertensive I will plan to place him on a nicardipine drip to gain control of his blood pressure with a goal of an approximate 140.  I suspect there is a balance with this patient of decreasing to significantly to cause hypoperfusion to the kidney.  Further patient is on anticoagulation for atrial flutter.  I believe risks of reversal currently outweigh risk of forming clots within the heart.  We will plan to give Kcentra.  Page placed to IR attending reviewing case with suspicion of need for embolization.  Recommendation was for transfer to ER.  I did place a call for lights and sirens transfer secondary to concern of critical illness and need for emergent OR intervention.  Further recommendation from IR was to discuss with urology.  Case discussed with recommendation/request for in person consultation.  Over the phone recommendation per urology is for embolization.    I have reviewed the nursing notes. I have reviewed the findings, diagnosis, plan and need for follow up with the patient.    New Prescriptions    No medications on file       Final diagnoses:   Subcapsular hematoma of kidney transplant   Hypertensive emergency   Anticoagulated       Abiel Doran  [facility] EMERGENCY DEPARTMENT  4/18/2023     Abiel Doran MD  04/19/23 0037

## 2023-04-18 NOTE — ED TRIAGE NOTES
L flank pain since this morning. Denies dysuria, hematuria.      Triage Assessment     Row Name 04/18/23 2434       Triage Assessment (Adult)    Airway WDL WDL       Respiratory WDL    Respiratory WDL WDL       Skin Circulation/Temperature WDL    Skin Circulation/Temperature WDL WDL       Cardiac WDL    Cardiac WDL WDL       Peripheral/Neurovascular WDL    Peripheral Neurovascular WDL WDL       Cognitive/Neuro/Behavioral WDL    Cognitive/Neuro/Behavioral WDL WDL

## 2023-04-18 NOTE — LETTER
[facility] [address]  [address] [facility] [address]  Phone: [phone]    April 20, 2023        Miguel Ángel Wilson  [address]   [place] [address]      To whom it may concern:    RE: Miguel Ángel Wilson    Patient was hospitalized from 4/18-4/26. Please excuse him from work during those days. Return to work precautions to be determined depending on hospital course.    Please contact me for questions or concerns.      Sincerely,  Nelda Martinez MD

## 2023-04-19 ENCOUNTER — APPOINTMENT (OUTPATIENT)
Dept: GENERAL RADIOLOGY | Facility: CLINIC | Age: 33
End: 2023-04-19
Payer: COMMERCIAL

## 2023-04-19 ENCOUNTER — APPOINTMENT (OUTPATIENT)
Dept: CT IMAGING | Facility: CLINIC | Age: 33
End: 2023-04-19
Payer: COMMERCIAL

## 2023-04-19 ENCOUNTER — APPOINTMENT (OUTPATIENT)
Dept: CARDIOLOGY | Facility: CLINIC | Age: 33
End: 2023-04-19
Attending: STUDENT IN AN ORGANIZED HEALTH CARE EDUCATION/TRAINING PROGRAM
Payer: COMMERCIAL

## 2023-04-19 VITALS — OXYGEN SATURATION: 94 % | SYSTOLIC BLOOD PRESSURE: 142 MMHG | DIASTOLIC BLOOD PRESSURE: 82 MMHG | HEART RATE: 116 BPM

## 2023-04-19 LAB
ANION GAP SERPL CALCULATED.3IONS-SCNC: 13 MMOL/L (ref 7–15)
APTT PPP: 33 SECONDS (ref 22–38)
BASOPHILS # BLD AUTO: 0 10E3/UL (ref 0–0.2)
BASOPHILS # BLD MANUAL: 0 10E3/UL (ref 0–0.2)
BASOPHILS NFR BLD AUTO: 0 %
BASOPHILS NFR BLD MANUAL: 0 %
BUN SERPL-MCNC: 12.5 MG/DL (ref 6–20)
BURR CELLS BLD QL SMEAR: SLIGHT
CALCIUM SERPL-MCNC: 8.7 MG/DL (ref 8.6–10)
CHLORIDE SERPL-SCNC: 99 MMOL/L (ref 98–107)
CREAT SERPL-MCNC: 1.6 MG/DL (ref 0.67–1.17)
DEPRECATED HCO3 PLAS-SCNC: 25 MMOL/L (ref 22–29)
EOSINOPHIL # BLD AUTO: 0 10E3/UL (ref 0–0.7)
EOSINOPHIL # BLD MANUAL: 0 10E3/UL (ref 0–0.7)
EOSINOPHIL NFR BLD AUTO: 0 %
EOSINOPHIL NFR BLD MANUAL: 0 %
ERYTHROCYTE [DISTWIDTH] IN BLOOD BY AUTOMATED COUNT: 15.4 % (ref 10–15)
ERYTHROCYTE [DISTWIDTH] IN BLOOD BY AUTOMATED COUNT: 15.5 % (ref 10–15)
FERRITIN SERPL-MCNC: 57 NG/ML (ref 31–409)
GFR SERPL CREATININE-BSD FRML MDRD: 59 ML/MIN/1.73M2
GLUCOSE BLDC GLUCOMTR-MCNC: 105 MG/DL (ref 70–99)
GLUCOSE BLDC GLUCOMTR-MCNC: 116 MG/DL (ref 70–99)
GLUCOSE BLDC GLUCOMTR-MCNC: 94 MG/DL (ref 70–99)
GLUCOSE BLDC GLUCOMTR-MCNC: 96 MG/DL (ref 70–99)
GLUCOSE SERPL-MCNC: 116 MG/DL (ref 70–99)
HCT VFR BLD AUTO: 38.7 % (ref 40–53)
HCT VFR BLD AUTO: 38.8 % (ref 40–53)
HGB BLD-MCNC: 11.8 G/DL (ref 13.3–17.7)
HGB BLD-MCNC: 12.1 G/DL (ref 13.3–17.7)
IMM GRANULOCYTES # BLD: 0.1 10E3/UL
IMM GRANULOCYTES NFR BLD: 1 %
INR PPP: 1.03 (ref 0.85–1.15)
IRON BINDING CAPACITY (ROCHE): 342 UG/DL (ref 240–430)
IRON SATN MFR SERPL: 17 % (ref 15–46)
IRON SERPL-MCNC: 57 UG/DL (ref 61–157)
LVEF ECHO: NORMAL
LYMPHOCYTES # BLD AUTO: 1 10E3/UL (ref 0.8–5.3)
LYMPHOCYTES # BLD MANUAL: 1.4 10E3/UL (ref 0.8–5.3)
LYMPHOCYTES NFR BLD AUTO: 7 %
LYMPHOCYTES NFR BLD MANUAL: 12 %
MAGNESIUM SERPL-MCNC: 1.4 MG/DL (ref 1.7–2.3)
MAGNESIUM SERPL-MCNC: 2.1 MG/DL (ref 1.7–2.3)
MCH RBC QN AUTO: 24.5 PG (ref 26.5–33)
MCH RBC QN AUTO: 24.6 PG (ref 26.5–33)
MCHC RBC AUTO-ENTMCNC: 30.4 G/DL (ref 31.5–36.5)
MCHC RBC AUTO-ENTMCNC: 31.3 G/DL (ref 31.5–36.5)
MCV RBC AUTO: 78 FL (ref 78–100)
MCV RBC AUTO: 81 FL (ref 78–100)
MONOCYTES # BLD AUTO: 1.2 10E3/UL (ref 0–1.3)
MONOCYTES # BLD MANUAL: 0.6 10E3/UL (ref 0–1.3)
MONOCYTES NFR BLD AUTO: 9 %
MONOCYTES NFR BLD MANUAL: 5 %
NEUTROPHILS # BLD AUTO: 11.5 10E3/UL (ref 1.6–8.3)
NEUTROPHILS # BLD MANUAL: 10 10E3/UL (ref 1.6–8.3)
NEUTROPHILS NFR BLD AUTO: 83 %
NEUTROPHILS NFR BLD MANUAL: 83 %
NRBC # BLD AUTO: 0 10E3/UL
NRBC BLD AUTO-RTO: 0 /100
PHOSPHATE SERPL-MCNC: 3.2 MG/DL (ref 2.5–4.5)
PLAT MORPH BLD: ABNORMAL
PLATELET # BLD AUTO: 210 10E3/UL (ref 150–450)
PLATELET # BLD AUTO: 258 10E3/UL (ref 150–450)
POTASSIUM SERPL-SCNC: 3.3 MMOL/L (ref 3.4–5.3)
POTASSIUM SERPL-SCNC: 4.2 MMOL/L (ref 3.4–5.3)
RBC # BLD AUTO: 4.79 10E6/UL (ref 4.4–5.9)
RBC # BLD AUTO: 4.94 10E6/UL (ref 4.4–5.9)
RBC MORPH BLD: ABNORMAL
SARS-COV-2 RNA RESP QL NAA+PROBE: NEGATIVE
SODIUM SERPL-SCNC: 137 MMOL/L (ref 136–145)
TROPONIN T SERPL HS-MCNC: 47 NG/L
TROPONIN T SERPL HS-MCNC: 59 NG/L
TROPONIN T SERPL HS-MCNC: 76 NG/L
TROPONIN T SERPL HS-MCNC: 87 NG/L
WBC # BLD AUTO: 12 10E3/UL (ref 4–11)
WBC # BLD AUTO: 13.9 10E3/UL (ref 4–11)

## 2023-04-19 PROCEDURE — 99222 1ST HOSP IP/OBS MODERATE 55: CPT | Mod: FS | Performed by: CLINICAL NURSE SPECIALIST

## 2023-04-19 PROCEDURE — 272N000143 HC KIT CR3

## 2023-04-19 PROCEDURE — 250N000011 HC RX IP 250 OP 636: Performed by: STUDENT IN AN ORGANIZED HEALTH CARE EDUCATION/TRAINING PROGRAM

## 2023-04-19 PROCEDURE — 82728 ASSAY OF FERRITIN: CPT

## 2023-04-19 PROCEDURE — 99207 PR NO BILLABLE SERVICE THIS VISIT: CPT | Performed by: CLINICAL NURSE SPECIALIST

## 2023-04-19 PROCEDURE — 36415 COLL VENOUS BLD VENIPUNCTURE: CPT

## 2023-04-19 PROCEDURE — 71045 X-RAY EXAM CHEST 1 VIEW: CPT

## 2023-04-19 PROCEDURE — 71045 X-RAY EXAM CHEST 1 VIEW: CPT | Mod: 26 | Performed by: RADIOLOGY

## 2023-04-19 PROCEDURE — 272N000566 HC SHEATH CR3

## 2023-04-19 PROCEDURE — 84132 ASSAY OF SERUM POTASSIUM: CPT | Performed by: INTERNAL MEDICINE

## 2023-04-19 PROCEDURE — C1769 GUIDE WIRE: HCPCS

## 2023-04-19 PROCEDURE — 200N000002 HC R&B ICU UMMC

## 2023-04-19 PROCEDURE — 99152 MOD SED SAME PHYS/QHP 5/>YRS: CPT

## 2023-04-19 PROCEDURE — 83735 ASSAY OF MAGNESIUM: CPT | Performed by: INTERNAL MEDICINE

## 2023-04-19 PROCEDURE — 250N000013 HC RX MED GY IP 250 OP 250 PS 637: Performed by: STUDENT IN AN ORGANIZED HEALTH CARE EDUCATION/TRAINING PROGRAM

## 2023-04-19 PROCEDURE — 255N000002 HC RX 255 OP 636: Performed by: INTERNAL MEDICINE

## 2023-04-19 PROCEDURE — 255N000002 HC RX 255 OP 636: Performed by: RADIOLOGY

## 2023-04-19 PROCEDURE — U0003 INFECTIOUS AGENT DETECTION BY NUCLEIC ACID (DNA OR RNA); SEVERE ACUTE RESPIRATORY SYNDROME CORONAVIRUS 2 (SARS-COV-2) (CORONAVIRUS DISEASE [COVID-19]), AMPLIFIED PROBE TECHNIQUE, MAKING USE OF HIGH THROUGHPUT TECHNOLOGIES AS DESCRIBED BY CMS-2020-01-R: HCPCS

## 2023-04-19 PROCEDURE — 85610 PROTHROMBIN TIME: CPT

## 2023-04-19 PROCEDURE — C1887 CATHETER, GUIDING: HCPCS

## 2023-04-19 PROCEDURE — 83735 ASSAY OF MAGNESIUM: CPT

## 2023-04-19 PROCEDURE — 84100 ASSAY OF PHOSPHORUS: CPT

## 2023-04-19 PROCEDURE — 272N000504 HC NEEDLE CR4

## 2023-04-19 PROCEDURE — 84484 ASSAY OF TROPONIN QUANT: CPT

## 2023-04-19 PROCEDURE — 76937 US GUIDE VASCULAR ACCESS: CPT

## 2023-04-19 PROCEDURE — 250N000011 HC RX IP 250 OP 636: Performed by: INTERNAL MEDICINE

## 2023-04-19 PROCEDURE — 36415 COLL VENOUS BLD VENIPUNCTURE: CPT | Performed by: INTERNAL MEDICINE

## 2023-04-19 PROCEDURE — 85007 BL SMEAR W/DIFF WBC COUNT: CPT

## 2023-04-19 PROCEDURE — 250N000013 HC RX MED GY IP 250 OP 250 PS 637

## 2023-04-19 PROCEDURE — 74174 CTA ABD&PLVS W/CONTRAST: CPT

## 2023-04-19 PROCEDURE — 250N000013 HC RX MED GY IP 250 OP 250 PS 637: Performed by: INTERNAL MEDICINE

## 2023-04-19 PROCEDURE — 85730 THROMBOPLASTIN TIME PARTIAL: CPT

## 2023-04-19 PROCEDURE — 99291 CRITICAL CARE FIRST HOUR: CPT | Mod: GC | Performed by: INTERNAL MEDICINE

## 2023-04-19 PROCEDURE — 250N000011 HC RX IP 250 OP 636

## 2023-04-19 PROCEDURE — 83550 IRON BINDING TEST: CPT

## 2023-04-19 PROCEDURE — 36251 INS CATH REN ART 1ST UNILAT: CPT

## 2023-04-19 PROCEDURE — 74174 CTA ABD&PLVS W/CONTRAST: CPT | Mod: 26 | Performed by: RADIOLOGY

## 2023-04-19 PROCEDURE — 04LA3DZ OCCLUSION OF LEFT RENAL ARTERY WITH INTRALUMINAL DEVICE, PERCUTANEOUS APPROACH: ICD-10-PCS | Performed by: RADIOLOGY

## 2023-04-19 PROCEDURE — 85025 COMPLETE CBC W/AUTO DIFF WBC: CPT

## 2023-04-19 PROCEDURE — 80048 BASIC METABOLIC PNL TOTAL CA: CPT

## 2023-04-19 PROCEDURE — 85027 COMPLETE CBC AUTOMATED: CPT

## 2023-04-19 PROCEDURE — 999N000208 ECHOCARDIOGRAM COMPLETE

## 2023-04-19 PROCEDURE — 258N000003 HC RX IP 258 OP 636

## 2023-04-19 PROCEDURE — 37244 VASC EMBOLIZE/OCCLUDE BLEED: CPT

## 2023-04-19 PROCEDURE — 99292 CRITICAL CARE ADDL 30 MIN: CPT | Mod: GC | Performed by: INTERNAL MEDICINE

## 2023-04-19 PROCEDURE — 36415 COLL VENOUS BLD VENIPUNCTURE: CPT | Performed by: EMERGENCY MEDICINE

## 2023-04-19 PROCEDURE — 99207 PR NO BILLABLE SERVICE THIS VISIT: CPT | Performed by: INTERNAL MEDICINE

## 2023-04-19 PROCEDURE — 86850 RBC ANTIBODY SCREEN: CPT | Performed by: EMERGENCY MEDICINE

## 2023-04-19 PROCEDURE — 250N000011 HC RX IP 250 OP 636: Performed by: EMERGENCY MEDICINE

## 2023-04-19 PROCEDURE — 93306 TTE W/DOPPLER COMPLETE: CPT | Mod: 26 | Performed by: INTERNAL MEDICINE

## 2023-04-19 RX ORDER — IOPAMIDOL 755 MG/ML
100 INJECTION, SOLUTION INTRAVASCULAR ONCE
Status: DISCONTINUED | OUTPATIENT
Start: 2023-04-19 | End: 2023-04-19 | Stop reason: DRUGHIGH

## 2023-04-19 RX ORDER — ACETAMINOPHEN 325 MG/10.15ML
650 LIQUID ORAL EVERY 4 HOURS PRN
Status: DISCONTINUED | OUTPATIENT
Start: 2023-04-19 | End: 2023-04-20

## 2023-04-19 RX ORDER — TORSEMIDE 20 MG/1
20 TABLET ORAL
Status: DISCONTINUED | OUTPATIENT
Start: 2023-04-19 | End: 2023-04-19

## 2023-04-19 RX ORDER — LANOLIN ALCOHOL/MO/W.PET/CERES
3 CREAM (GRAM) TOPICAL AT BEDTIME
Status: DISCONTINUED | OUTPATIENT
Start: 2023-04-19 | End: 2023-04-25 | Stop reason: HOSPADM

## 2023-04-19 RX ORDER — DEXTROSE MONOHYDRATE 25 G/50ML
25-50 INJECTION, SOLUTION INTRAVENOUS
Status: DISCONTINUED | OUTPATIENT
Start: 2023-04-19 | End: 2023-04-25 | Stop reason: HOSPADM

## 2023-04-19 RX ORDER — OXYCODONE HYDROCHLORIDE 5 MG/1
5 TABLET ORAL EVERY 4 HOURS PRN
Status: DISCONTINUED | OUTPATIENT
Start: 2023-04-19 | End: 2023-04-20

## 2023-04-19 RX ORDER — IODIXANOL 320 MG/ML
250 INJECTION, SOLUTION INTRAVASCULAR ONCE
Status: COMPLETED | OUTPATIENT
Start: 2023-04-19 | End: 2023-04-19

## 2023-04-19 RX ORDER — NICOTINE POLACRILEX 4 MG
15-30 LOZENGE BUCCAL
Status: DISCONTINUED | OUTPATIENT
Start: 2023-04-19 | End: 2023-04-25 | Stop reason: HOSPADM

## 2023-04-19 RX ORDER — METHOCARBAMOL 500 MG/1
1000 TABLET, FILM COATED ORAL 3 TIMES DAILY PRN
Status: DISCONTINUED | OUTPATIENT
Start: 2023-04-19 | End: 2023-04-25 | Stop reason: HOSPADM

## 2023-04-19 RX ORDER — POTASSIUM CHLORIDE 750 MG/1
40 TABLET, EXTENDED RELEASE ORAL ONCE
Status: COMPLETED | OUTPATIENT
Start: 2023-04-19 | End: 2023-04-19

## 2023-04-19 RX ORDER — FUROSEMIDE 10 MG/ML
60 INJECTION INTRAMUSCULAR; INTRAVENOUS ONCE
Status: COMPLETED | OUTPATIENT
Start: 2023-04-19 | End: 2023-04-19

## 2023-04-19 RX ORDER — LABETALOL HYDROCHLORIDE 5 MG/ML
20 INJECTION, SOLUTION INTRAVENOUS EVERY 6 HOURS PRN
Status: DISCONTINUED | OUTPATIENT
Start: 2023-04-19 | End: 2023-04-22

## 2023-04-19 RX ORDER — METOPROLOL SUCCINATE 25 MG/1
25 TABLET, EXTENDED RELEASE ORAL DAILY
Status: DISCONTINUED | OUTPATIENT
Start: 2023-04-19 | End: 2023-04-22

## 2023-04-19 RX ORDER — NALOXONE HYDROCHLORIDE 0.4 MG/ML
0.2 INJECTION, SOLUTION INTRAMUSCULAR; INTRAVENOUS; SUBCUTANEOUS
Status: DISCONTINUED | OUTPATIENT
Start: 2023-04-19 | End: 2023-04-25 | Stop reason: HOSPADM

## 2023-04-19 RX ORDER — IOPAMIDOL 755 MG/ML
100 INJECTION, SOLUTION INTRAVASCULAR ONCE
Status: COMPLETED | OUTPATIENT
Start: 2023-04-19 | End: 2023-04-19

## 2023-04-19 RX ORDER — POLYETHYLENE GLYCOL 3350 17 G/17G
17 POWDER, FOR SOLUTION ORAL DAILY PRN
Status: DISCONTINUED | OUTPATIENT
Start: 2023-04-19 | End: 2023-04-21

## 2023-04-19 RX ORDER — ONDANSETRON 4 MG/1
4 TABLET, ORALLY DISINTEGRATING ORAL EVERY 6 HOURS PRN
Status: DISCONTINUED | OUTPATIENT
Start: 2023-04-19 | End: 2023-04-24

## 2023-04-19 RX ORDER — LISINOPRIL 20 MG/1
40 TABLET ORAL DAILY
Status: DISCONTINUED | OUTPATIENT
Start: 2023-04-19 | End: 2023-04-25 | Stop reason: HOSPADM

## 2023-04-19 RX ORDER — ACETAMINOPHEN 325 MG/1
650 TABLET ORAL EVERY 4 HOURS PRN
Status: DISCONTINUED | OUTPATIENT
Start: 2023-04-19 | End: 2023-04-20

## 2023-04-19 RX ORDER — GABAPENTIN 300 MG/1
300 CAPSULE ORAL 3 TIMES DAILY
Status: DISCONTINUED | OUTPATIENT
Start: 2023-04-19 | End: 2023-04-19

## 2023-04-19 RX ORDER — ONDANSETRON 2 MG/ML
4 INJECTION INTRAMUSCULAR; INTRAVENOUS EVERY 6 HOURS PRN
Status: DISCONTINUED | OUTPATIENT
Start: 2023-04-19 | End: 2023-04-24

## 2023-04-19 RX ORDER — NALOXONE HYDROCHLORIDE 0.4 MG/ML
0.4 INJECTION, SOLUTION INTRAMUSCULAR; INTRAVENOUS; SUBCUTANEOUS
Status: DISCONTINUED | OUTPATIENT
Start: 2023-04-19 | End: 2023-04-25 | Stop reason: HOSPADM

## 2023-04-19 RX ORDER — MAGNESIUM SULFATE HEPTAHYDRATE 40 MG/ML
4 INJECTION, SOLUTION INTRAVENOUS ONCE
Status: COMPLETED | OUTPATIENT
Start: 2023-04-19 | End: 2023-04-19

## 2023-04-19 RX ORDER — HYDROMORPHONE HCL IN WATER/PF 6 MG/30 ML
.2-.5 PATIENT CONTROLLED ANALGESIA SYRINGE INTRAVENOUS
Status: DISCONTINUED | OUTPATIENT
Start: 2023-04-19 | End: 2023-04-20

## 2023-04-19 RX ADMIN — SODIUM CHLORIDE, POTASSIUM CHLORIDE, SODIUM LACTATE AND CALCIUM CHLORIDE 500 ML: 600; 310; 30; 20 INJECTION, SOLUTION INTRAVENOUS at 03:37

## 2023-04-19 RX ADMIN — NICARDIPINE HYDROCHLORIDE 15 MG/HR: 0.2 INJECTION, SOLUTION INTRAVENOUS at 03:34

## 2023-04-19 RX ADMIN — OXYCODONE HYDROCHLORIDE 5 MG: 5 TABLET ORAL at 21:11

## 2023-04-19 RX ADMIN — METHOCARBAMOL 1000 MG: 500 TABLET ORAL at 02:16

## 2023-04-19 RX ADMIN — Medication 3 MG: at 21:11

## 2023-04-19 RX ADMIN — ACETAMINOPHEN 650 MG: 325 TABLET, FILM COATED ORAL at 05:59

## 2023-04-19 RX ADMIN — MAGNESIUM SULFATE IN WATER 4 G: 40 INJECTION, SOLUTION INTRAVENOUS at 06:53

## 2023-04-19 RX ADMIN — LISINOPRIL 40 MG: 20 TABLET ORAL at 02:17

## 2023-04-19 RX ADMIN — OXYCODONE HYDROCHLORIDE 5 MG: 5 TABLET ORAL at 16:33

## 2023-04-19 RX ADMIN — LABETALOL HYDROCHLORIDE 20 MG: 5 INJECTION, SOLUTION INTRAVENOUS at 17:37

## 2023-04-19 RX ADMIN — NICARDIPINE HYDROCHLORIDE 15 MG/HR: 0.2 INJECTION, SOLUTION INTRAVENOUS at 00:43

## 2023-04-19 RX ADMIN — OXYCODONE HYDROCHLORIDE 5 MG: 5 TABLET ORAL at 12:45

## 2023-04-19 RX ADMIN — GABAPENTIN 300 MG: 300 CAPSULE ORAL at 08:54

## 2023-04-19 RX ADMIN — LISINOPRIL 40 MG: 20 TABLET ORAL at 08:54

## 2023-04-19 RX ADMIN — HYDROMORPHONE HYDROCHLORIDE 0.5 MG: 0.2 INJECTION, SOLUTION INTRAMUSCULAR; INTRAVENOUS; SUBCUTANEOUS at 02:16

## 2023-04-19 RX ADMIN — IOPAMIDOL 100 ML: 755 INJECTION, SOLUTION INTRAVENOUS at 09:46

## 2023-04-19 RX ADMIN — IODIXANOL 250 ML: 320 INJECTION, SOLUTION INTRAVASCULAR at 01:08

## 2023-04-19 RX ADMIN — HUMAN ALBUMIN MICROSPHERES AND PERFLUTREN 5 ML: 10; .22 INJECTION, SOLUTION INTRAVENOUS at 12:01

## 2023-04-19 RX ADMIN — HYDROMORPHONE HYDROCHLORIDE 0.5 MG: 0.2 INJECTION, SOLUTION INTRAMUSCULAR; INTRAVENOUS; SUBCUTANEOUS at 05:59

## 2023-04-19 RX ADMIN — METOPROLOL SUCCINATE 25 MG: 25 TABLET, EXTENDED RELEASE ORAL at 08:54

## 2023-04-19 RX ADMIN — METOPROLOL SUCCINATE 25 MG: 25 TABLET, EXTENDED RELEASE ORAL at 02:17

## 2023-04-19 RX ADMIN — POTASSIUM CHLORIDE 40 MEQ: 750 TABLET, EXTENDED RELEASE ORAL at 06:52

## 2023-04-19 RX ADMIN — HYDROMORPHONE HYDROCHLORIDE 0.5 MG: 0.2 INJECTION, SOLUTION INTRAMUSCULAR; INTRAVENOUS; SUBCUTANEOUS at 10:08

## 2023-04-19 RX ADMIN — FUROSEMIDE 60 MG: 10 INJECTION, SOLUTION INTRAVENOUS at 05:27

## 2023-04-19 ASSESSMENT — ACTIVITIES OF DAILY LIVING (ADL)
ADLS_ACUITY_SCORE: 35

## 2023-04-19 NOTE — PROGRESS NOTES
"Urology  Progress Note    S/p embolization by IR  Spoke with IR afterwards, they felt they got some vessels, but not the whole pseudoaneurysm was not embolized; recommended possible repeat CTA in AM and may need new IR consult for further embolization   Voiding spontaneously, without blood     Exam  /66   Pulse 93   Temp 98.4  F (36.9  C) (Axillary)   Resp (!) 36   Ht 1.88 m (6' 2\")   Wt 135.8 kg (299 lb 6.2 oz)   SpO2 97%   BMI 38.44 kg/m    No acute distress  Unlabored breathing  Abdomen soft,  appropriately tender, nondistended. Groin incision c/d/i  No CVA tenderness    /--    Labs  Recent Labs   Lab Test 04/19/23  0436 04/18/23  1736 01/23/22  0700   WBC 13.9* 10.8 7.7   HGB 11.8* 12.2* 11.2*   CR 1.60* 1.28* 1.86*        Assessment/Plan  32 year old y/o male POD#1 s/p left renal artery embolization by IR. Spoke with IR who were able to embolize some vessels, but felt that the pseudoaneurysm was still present. Felt that it may resolve with BP control and AC stopped. Given his stability no plans for surgical intervention at this time.     - recommend CTA per discussion with IR; if residual pseudoaneurysm would recommend placing another IR consult  - urology will continue to follow; please reach out with questions or concerns    Seen and examined with the chief resident. Will discuss with Dr. Cordova.    Bunny Galvan MD  Urology Resident     Contacting the Urology Team     Please use the following job codes to reach the Urology Team. Note that you must use an in house phone and that job codes cannot receive text pages.     On weekdays, dial [phone] (or star-star-star [phone] on the new [employer] telephones) then [phone] to reach the Adult Urology resident or PA on call    On weekdays, dial [phone] (or star-star-star [phone] on the new [employer] telephones) then [phone] to reach the Pediatric Urology resident    On weeknights and weekends, dial [phone] (or star-star-star [phone] on the new [employer] telephones) then [phone] to reach the " Urology resident on call (for both Adult and Pediatrics)

## 2023-04-19 NOTE — PLAN OF CARE
Time: 0700 - 1930    Major Shift Events: VS remain stable on RA to 2 L NC when sleeping. Intermittent HTN, IV labetalol given x1 to maintain goal BP <180/<110. Afebrile. Tele NSR/ST. Pt reporting improvement in pain, PO oxycodone providing adequate relief per pt. Repeat abdominal CT revealed stable hematoma and no new bleeding. Pt ordered dinner, tolerated well. Voiding WDL via urinal. Echo completed.     Plan: Continue to monitor VS and CBC overnight. Follow plan of care, update MD with changes.    For vital signs and complete assessments, please see documentation flowsheets.

## 2023-04-19 NOTE — PHARMACY-ADMISSION MEDICATION HISTORY
Pharmacist Admission Medication History    Admission medication history is complete. The information provided in this note is only as accurate as the sources available at the time of the update.    Medication reconciliation/reorder completed by provider prior to medication history? No    Information Source(s): Patient, EPIC chart review/Surescripts RX fill history via in-person    Pertinent Information: Surescript fill history noted below.  Patient noted they ran out of lisinopril at one point. But states they take Eliquis, metoprolol and torsemide daily   - Apixaban last fill 11/18/22 #60/30DS  - Lisinopril last fill 09/16/22 #90/90DS  - Metoprolol XL last fill 09/12/22 #90/90DS  - Torsemide last fill 12/20/22 #60/30DS    Changes made to PTA medication list:    Added: None    Deleted: APAP and Gabapentim    Changed: None     Allergies reviewed with patient and updates made in EHR: no    Medication History Completed By: Nahomy Nolan [facility] 4/19/2023 9:31 AM    Prior to Admission medications    Medication Sig Last Dose Taking? Auth Provider Long Term End Date   apixaban ANTICOAGULANT (ELIQUIS) 5 MG tablet Take 1 tablet (5 mg) by mouth 2 times daily  Yes Marivel Willis PA-C     lisinopril (ZESTRIL) 40 MG tablet Take 1 tablet (40 mg) by mouth daily  Yes Marivel Willis PA-C Yes    metoprolol succinate ER (TOPROL-XL) 25 MG 24 hr tablet Take 1 tablet (25 mg) by mouth daily  Yes Marivel Willis PA-C Yes    torsemide (DEMADEX) 20 MG tablet Take 1 tablet (20 mg) by mouth 2 times daily  Yes Marivel Willis PA-C Yes

## 2023-04-19 NOTE — PROCEDURES
[employer]    Procedure: IR Procedure Note    Date/Time: 4/19/2023 1:16 AM    Performed by: Seth Hui MD  Authorized by: Ronnie Mclain MD      UNIVERSAL PROTOCOL   Site Marked: NA  Prior Images Obtained and Reviewed:  Yes  Required items: Required blood products, implants, devices and special equipment available    Patient identity confirmed:  Verbally with patient, arm band, provided demographic data and hospital-assigned identification number  Patient was reevaluated immediately before administering moderate or deep sedation or anesthesia  Confirmation Checklist:  Patient's identity using two indicators, relevant allergies, procedure was appropriate and matched the consent or emergent situation and correct equipment/implants were available  Time out: Immediately prior to the procedure a time out was called    Universal Protocol: the Joint Commission Universal Protocol was followed    Preparation: Patient was prepped and draped in usual sterile fashion       ANESTHESIA    Anesthesia: Local infiltration  Local Anesthetic:  Lidocaine 1% without epinephrine      SEDATION  Patient Sedated: Yes    Sedation:  Fentanyl and midazolam  Vital signs: Vital signs monitored during sedation    See dictated procedure note for full details.  Findings: Left renal angiogram demonstrates evidence of pseudoaneurysm of superior pole. However selective segmental renal angiograms could not identify discrete vessel of extravasation. Suspected small superior pole segmental vessel was embolized however psueodaneurysm persisted post embolization.     Specimens: none    Complications: None    Condition: Stable    Plan: Left renal angiogram demonstrates evidence of pseudoaneurysm of superior pole. However selective segmental renal angiograms could not identify discrete vessel of extravasation. Suspected small superior pole segmental vessel was embolized however psueodaneurysm persisted  post embolization.       PROCEDURE  Describe Procedure: Left renal angiogram demonstrates evidence of pseudoaneurysm of superior pole. However selective segmental renal angiograms could not identify discrete vessel of extravasation. Suspected small superior pole segmental vessel was embolized however psueodaneurysm persisted post embolization.   Patient Tolerance:  Patient tolerated the procedure well with no immediate complications  Length of time physician/provider present for 1:1 monitoring during sedation: 30

## 2023-04-19 NOTE — PROGRESS NOTES
MEDICAL ICU PROGRESS NOTE  04/19/2023      Date of Service (when I saw the patient): 04/19/2023    ASSESSMENT: Miguel Ángel Wilson is a 32 year old male with PMH of CHF EF 30-35% 1/22, CKD, HTN, atrial fibrillation who was admitted on 4/18 for left flank pain found on CT to be subcapsular hematoma now s/p IR embolization admitted to the ICU for hypertensive monitoring and kidney extravasation.    CHANGES and MAJOR THINGS TODAY:   - repeat CTA  - PTA metoprolol and lisinopril  - stop nicardipine gtt  - TTE  - urology consulted    PLAN:    Neuro:  #Pain  - oxycodone 5mg q4h PRN  - Dilaudid q2h PRN     Pulmonary:  No active issues. Mild pulmonary congestion on CXR. On room air.    Cardiovascular:  #Hypertension  #Hypertensive urgency  Patient with initial BP of 200/154 on admission, possibly related to non-adherence to medications as well as subcapsular hematoma. Has previously had some work up for secondary hypertension which showed possible hyperaldosteronism. Has not had sleep study for obstructive sleep apnea.  - Resume PTA metoprolol  - Nicardipine gtt started after hypertension refractory to 2x hydralazine 10mg IV   > gtt is now off  - PRN labetalol available if needed    #HFrEF, EF 30-35% (1/22)  Patient initially diagnosed with HFrEF in November of 2021 after [place] [facility] [place] and [place] extremity edema. TTE showed LVEF of 45% and LVH, discharged on lisinopril and carvedilol at that time. Pt. Presented to [facility] cardiology in January of 2022 with ongoing dyspnea and worsening LE edema. pBNP was 6,200 and troponin 141 --> 104 at that time. He underwent aggressive diuresis with bumex gtt with improvement of edema. Patient's carvedilol was replaced with metoprolol at that time and he was started on torsemide and apixaban in addition to his prior lisinopril. Now on this admission, patient's BNP found to be 2,256 and troponin of 26. EKG showed sinus rhythm with LV hypertrophy and QT prolongation. Most recent  EF was 30-35% on 01/21/22. Patient was given a dose of 60mg IV lasix on admission. CXR without significant edema and patient without edema on exam. Low suspicion for CHF exacerbation.  - Resume PTA lisinopril, metoprolol  - repeat TTE  - arrange cardiology followup on discharge    #Atrial fibrillation  On PTA metoprolol. Holding PTA apixaban.    #Troponin elevation  Patient with reproducible chest pain on admission. EKG without concern for ACS. Troponin elevated to 26 --> 47. Repeat pending. Suspect troponin elevation in the setting of hypertensive urgency.  - repeat troponin pending     GI/Nutrition:  - No concerns at this time      Renal/Fluids/Electrolytes:  #Acute renal hematoma and 2.3cm pseudoaneurysm  No trauma or inciting event for the hematoma, though patient is on anticoagulation. Underwent IR embolization on 4/18, though per IR uncertain whether correct vessel was embolized. Will repeat CTA on 4/19 to evaluate for continual bleeding. Urology also following. Patient is vitally stable and hemoglobins stable as well.  - repeat CTA pending  - urology consulted    #CKD  Unclear etiology of CKD, possibly in the setting of uncontrolled hypertension. Baseline creatinine appears to be 1.6-1.7 though patient has not had close followup. Creatinine appears to be around recent baseline. Renal consulted.     Endocrine:  # Stress Hyperglycemia  - Trend blood glucose      ID:  No concerns at this time. Patient does have leukocytosis to 12-13, but no infectious signs or symptoms. Will check procalcitonin.     Hematology:    #Left subcapsular hematoma, as above  Hemoglobins remain stable.     Musculoskeletal:  #Atypical chest pain  Patient has reproducible chest pain on exam, likely in the setting of exertion at work. Troponin mildly elevated, trending. No EKG changes.     Skin:  No concerns at this time     General Cares/Prophylaxis:    DVT Prophylaxis: Not indicated  GI Prophylaxis: Not indicated  Restraints: No  Family  "Communication: Will update mother  Code Status: Full code     Lines/tubes/drains:  - 2x peripheral IVs     Disposition:  - Medical ICU       Patient seen and findings/plan discussed with medical ICU staff, Dr. Oviedo.    Gracie Fong MD    Clinically Significant Risk Factors Present on Admission        # Hypokalemia: Lowest K = 3.3 mmol/L in last 2 days, will replace as needed     # Hypomagnesemia: Lowest Mg = 1.4 mg/dL in last 2 days, will replace as needed    # Drug Induced Coagulation Defect: home medication list includes an anticoagulant medication    # Hypertension: home medication list includes antihypertensive(s)      # Obesity: Estimated body mass index is 38.44 kg/m  as calculated from the following:    Height as of this encounter: 1.88 m (6' 2\").    Weight as of this encounter: 135.8 kg (299 lb 6.2 oz).                   ====================================  INTERVAL HISTORY:   Patient in no acute distress this morning. States that he feels well. Denies pain, shortness of breath, nausea, vomiting. Denies headache or vision changes. Has good urine output.    OBJECTIVE:   1. VITAL SIGNS:   Temp:  [98.3  F (36.8  C)-98.4  F (36.9  C)] 98.4  F (36.9  C)  Pulse:  [] 96  Resp:  [10-47] 36  BP: ()/() 136/80  SpO2:  [89 %-100 %] 92 %  Resp: (!) 36    2. INTAKE/ OUTPUT:   I/O last 3 completed shifts:  In: 995.54 [I.V.:995.54]  Out: 1150 [Urine:1150]    3. PHYSICAL EXAMINATION:  General:  Alert, oriented, in no acute distress  HEENT: Atraumatic, normocephalic, EOMI  Neuro: A&Ox3, CN II-XII grossly intact, strength 5/5 in all extremities  Pulm/Resp: Clear breath sounds bilaterally without rhonchi, crackles or wheeze, breathing non-labored  CV: RRR, no m/r/g, no lower extremity edema, JVD not appreciated  Abdomen: Soft, non-distended. Mildly tender in left mid abdomen. No flank tenderness.    4. LABS:   Arterial Blood Gases   No lab results found in last 7 days.  Complete Blood Count   Recent Labs "   Lab 04/19/23  0436 04/18/23  1736   WBC 13.9* 10.8   HGB 11.8* 12.2*    259     Basic Metabolic Panel  Recent Labs   Lab 04/19/23  0436 04/19/23  0134 04/18/23  1736     --  136   POTASSIUM 3.3*  --  3.4   CHLORIDE 99  --  97*   CO2 25  --  27   BUN 12.5  --  11.1   CR 1.60*  --  1.28*   * 116* 98     Liver Function Tests  Recent Labs   Lab 04/19/23  0436 04/18/23  1736   AST  --  22   ALT  --  21   ALKPHOS  --  120   BILITOTAL  --  0.9   ALBUMIN  --  4.2   INR 1.03  --      Coagulation Profile  Recent Labs   Lab 04/19/23  0436   INR 1.03   PTT 33       5. RADIOLOGY:   Recent Results (from the past 24 hour(s))   Abd/pelvis CT no contrast - [facility] Protocol    Narrative    EXAM: CT ABDOMEN PELVIS W/O CONTRAST  LOCATION: [facility]  DATE/TIME: 4/18/2023 7:37 PM CDT    INDICATION: L flank pain  COMPARISON: None.  TECHNIQUE: CT scan of the abdomen and pelvis was performed without IV contrast. Multiplanar reformats were obtained. Dose reduction techniques were used.  CONTRAST: None.    FINDINGS:   LOWER CHEST: Mosaic attenuation at the lung bases to small airway disease.    HEPATOBILIARY: Normal.    PANCREAS: Normal.    SPLEEN: Normal.    ADRENAL GLANDS: Normal.    KIDNEYS/BLADDER: Right kidney within normal limits. There is a left subcapsular hematoma measuring approximately 9 x 4.5 cm which has mass effect upon the left kidney. Surrounding hemorrhage is noted in the anterior left perirenal space. No   hydronephrosis. No stones are identified.    BOWEL: No obstruction or inflammatory change.    LYMPH NODES: Normal.    VASCULATURE: Unremarkable.    PELVIC ORGANS: Bladder within normal limits.    MUSCULOSKELETAL: Normal.      Impression    IMPRESSION:   1.  Large left subcapsular hematoma measuring approximately 9 x 4.5 cm which has mass effect upon the left kidney. Surrounding hemorrhage is noted in the anterior left perirenal space. No hydronephrosis. Can  not evaluate for active extravasation due to   lack of intravenous contrast.    FIndings were discussed with Dr. Doran at 8:10 pm on 4/16/2023.        CT Abdomen Pelvis w Contrast   Result Value    Radiologist flags (DELFINO)     Active extravasation from a vessel or major vascular injury    Narrative    EXAM: CT ABDOMEN PELVIS W CONTRAST  LOCATION: [facility]  DATE/TIME: 4/18/2023 8:52 PM CDT    INDICATION: Severe L flank pain  COMPARISON: Same date noncontrast CT.  TECHNIQUE: CT scan of the abdomen and pelvis was performed following injection of IV contrast. Multiplanar reformats were obtained. Dose reduction techniques were used.  CONTRAST: 135 mL Isovue 370    FINDINGS:   LOWER CHEST: Unremarkable.    HEPATOBILIARY: Normal.    PANCREAS: Normal.    SPLEEN: Normal.    ADRENAL GLANDS: Normal.    RIGHT KIDNEY: No hydronephrosis. 1.8 cm relatively hypodense lesion in the anterior lower pole with questionable enhancement in comparison to same day noncontrast CT (approximately 20 Hounsfield units).    LEFT KIDNEY: No hydronephrosis. Large subcapsular hematoma again noted with significant compression of the renal parenchyma. There is likely segmental infarction of a portion of the lower pole with relative hypoenhancement of additional areas of the   renal cortex. There is a likely pseudoaneurysm which follows the blood pool in the medial right upper pole measuring up to 2.3 cm (series 5 image 68). There is an additional focus of active hemorrhage extending from the lower pole into the subcapsular   hematoma which expands on delayed images. No suspicious enhancing renal mass. There are small volume blood products outside of the subcapsular hematoma extending into the retroperitoneum, although there is no evidence of active hemorrhage in this space   during the exam.    URINARY BLADDER: Partially decompressed but otherwise unremarkable.    BOWEL: No obstruction or inflammatory  change. Normal appendix.    LYMPH NODES: No suspicious lymphadenopathy.    VASCULATURE: Visualized portions of the left renal artery are patent without evidence of aneurysmal dilation or dissection. No significant atherosclerosis visualized.    PELVIC ORGANS: Normal.    MUSCULOSKELETAL: No acute bony abnormality.      Impression    IMPRESSION:   1.  Large left subcapsular hematoma with compression of the renal parenchyma and likely resultant segmental infarction in the left lower pole.  2.  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 Recommend consultation with   interventional radiology.  3.  Right renal lesion measuring 1.8 cm with questionable low-level enhancement, consider further evaluation with MRI on a nonemergent basis.      [Critical Result: Active extravasation from a vessel or major vascular injury]    Finding was identified on 4/18/2023 9:04 PM CDT.     Dr. Doran was contacted by me on 4/18/2023 9:11 PM CDT and verbalized understanding of the critical result.     XR Chest Port 1 View    Narrative    XR CHEST PORT 1 VIEW  4/19/2023 4:56 AM      HISTORY: Assess for pulmonary edema.    COMPARISON: Chest x-ray 1/22/2022.    FINDINGS:   Portable AP 60 degree upright chest x-ray.    Trachea is midline. Cardiac silhouette is enlarged. Pulmonary  vasculature is distinct. No focal airspace opacity, pleural effusion,  pneumothorax.    Bony structures appear intact.      Impression    IMPRESSION:   Stable cardiomegaly and mild pulmonary venous congestion. No focal  airspace opacities identified.    I have personally reviewed the examination and initial interpretation  and I agree with the findings.    JOSEPHINE PITTMAN MD         SYSTEM ID:  [id number]

## 2023-04-19 NOTE — H&P
MEDICAL ICU H&P  04/19/2023    Date of Hospital Admission: 4/18/2023  Date of ICU Admission: 4/19/2023  Reason for Critical Care Admission: Active extravasation of kidney s/p procedure and hypertension  Date of Service (when I saw the patient): 04/19/2023     ASSESSMENT: Miguel Ángel Wilson is a 32 year old male with PMH of HTN, atrial fibrillation, CKD, CHF who was admitted on 4/18 for left flank pain found on CT to be subcapsular hematoma now s/p IR embolization admitted to the ICU for hypertensive monitoring and kidney extravasation.     PLAN:     Neuro:  # Sedation  - Dilaudid q2h PRN     Pulmonary:  # Previous COVID  - Covid positive 1/21/22, CXR w/o signs of pneumonia at that time    #Hx of tobacco use  - Longstanding hx of tobacco use, quit in November 2021     Cardiovascular:  #Acute on chronic HFrEF exacerbation  #Atrial flutter with RVR  #Elevated troponin  Patient initially diagnosed with HFrEF in November of 2021 [place] [facility] [place]. TTE showed LVEF of 45% and LVH, discharged on lisinopril and carvedilol at that time. Pt. Presented to [facility] cardiology in January of 2022 with ongoing dyspnea and worsening LE edema. pBNP was 6,200 and troponin 141 --> 104 at that time. He underwent aggressive diuresis with bumex gtt with improvement of edema. Patient's carvedilol was replaced with metoprolol at that time and he was started on torsemide and apixaban in addition to his prior lisinopril. On this admission, patient BNP found to be 2,256 and troponin of 26. EKG showed sinus rhythm with LV hypertrophy and QT prolongation. Most recent EF was 30-35% on 01/21/22.  - Resume PTA lisinopril, metoprolol  - Hold torsemide, start IV lasix 60 mg x1 as part of hypertensive crises management   - Assess response, I/Os   - Magnesium check in the setting of diuresis  - Order CXR to rule out pulmonary edema  - Apixaban held 2/2 patient's acute renal hematoma  - Elevated troponin likely  demand in the setting of decompensated heart failure and hypertension  - Consult cardiology, re-visit outpatient regimen and etiology    #Hypertension  #Hypertensive urgency  Patient initial BP of 200/154, possibly related to non-adherence to medications. Has previously had some work up for secondary hypertension which showed possible hyperaldosteronism. Has not had sleep study for obstructive sleep apnea yet.   - Resume PTA metoprolol  - Nicardipine 40mg in 200mL NS refractory to 2x hydralazine 10mg IV   -Titrate down on nicardipine   - Goal systolic blood pressure of 130-150  - Consider secondary hypertension and possible underlying PARVEEN     GI/Nutrition:  - No concerns at this time      Renal/Fluids/Electrolytes:  #Acute renal hematoma and 2.3cm pseudoaneurysm  #CKD  No trauma or inciting event for this bleeding, likely 2/2 uncontrolled HTN and anticoagulation. Patient is otherwise stable.  - S/p IR embolization  - Consult urology in the AM  - Cr 1.28 (unclear baseline), continue trending creatinine  - Repeat BMP  - Consult nephrology for further medication management    Endocrine:  # Stress Hyperglycemia  - Trend blood glucose      ID:  - No concerns at this time      Hematology:    # Extravasation  Unknown cause of patient's extravasation, workup ongoing.  - INR pending  - Apixaban held  - Possible iron deficiency anemia on CBC   - Iron study pending     Musculoskeletal:  #Chest Pain of one day, likely due to physical labor at work  - Continue to evaluate       Skin:  - No concerns at this time    General Cares/Prophylaxis:    DVT Prophylaxis: Not indicated  GI Prophylaxis: Not indicated  Restraints: No  Family Communication: Mother is primary contact, was unable to contact overnight.  Code Status: Full code    Lines/tubes/drains:  - 2x peripheral IVs    Disposition:  - Medical ICU    Patient seen and findings/plan discussed with medical ICU staff, Dr. Michaels.    [facility]  "Student    Resident/Fellow Attestation   I, Prakash Torres MD, was present with the medical/JOSE ARMANDO student who participated in the service and in the documentation of the note.  I have verified the history and personally performed the physical exam and medical decision making.  I agree with the assessment and plan of care as documented in the note.      Prakash Torres, DO  PGY-2, Internal Medicine  [facility]    Clinically Significant Risk Factors Present on Admission               # Drug Induced Coagulation Defect: home medication list includes an anticoagulant medication    # Hypertension: home medication list includes antihypertensive(s)      # Obesity: Estimated body mass index is 38.44 kg/m  as calculated from the following:    Height as of this encounter: 1.88 m (6' 2\").    Weight as of this encounter: 135.8 kg (299 lb 6.2 oz).               -----------------------------------------------------------------------    HISTORY PRESENTING ILLNESS: Miguel Ángel Wilson is a 32 year-old male with a history of HTN, atrial fibrillation, CKD, CHF who presented to the ED on 4/18 with complaints of left flank pain. He felt the onset of his symptoms the evening of 4/17. 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 He subsequently presented to the ED where he was found to be tachycardic to 110's and hypertensive to 200's systolic. CT scan was significant for left subcapsular hematoma and pseudoaneurysm. IR was consulted and the patient is now s/p acute renal hematoma embolization.     In the ICU unit, the patient explains the flank pain was new for him and had never happened in the past. His PTA medications include lisinopril, metoprolol, apixaban, and torsemide, which he takes intermittently as he often runs out of them. He does not regularly see cardiology in clinic, noting that the last time was approximately one year ago. " The patient explains having some chest pain which he describes as musculoskeletal in nature. He has had about 3 months of increased urinary duration and frequency, however he denies any other urinary symptoms, nausea, vomiting, recent fevers, fatigue, numbness, or tingling. He last took his medications today in addition to some tylenol.    REVIEW OF SYSTEMS: See HPI.    PAST MEDICAL HISTORY:   Past Medical History:   Diagnosis Date     Atrial flutter with rapid ventricular response (H) 1/31/2022     Essential hypertension 1/31/2022     Hypertension      Morbid obesity (H) 1/31/2022     Tobacco use 1/31/2022     SURGICAL HISTORY:  History reviewed. No pertinent surgical history.  SOCIAL HISTORY:  Social History     Socioeconomic History     Marital status: Single     Spouse name: None     Number of children: None     Years of education: None     Highest education level: None   Tobacco Use     Smoking status: Some Days     Smokeless tobacco: Never   Substance and Sexual Activity     Alcohol use: No     Drug use: No     FAMILY HISTORY:   Patient notes family history on father's side of heart failure and diabetes.    ALLERGIES:   No Known Allergies  MEDICATIONS:  No current facility-administered medications on file prior to encounter.  apixaban ANTICOAGULANT (ELIQUIS) 5 MG tablet, Take 1 tablet (5 mg) by mouth 2 times daily  lisinopril (ZESTRIL) 40 MG tablet, Take 1 tablet (40 mg) by mouth daily  metoprolol succinate ER (TOPROL-XL) 25 MG 24 hr tablet, Take 1 tablet (25 mg) by mouth daily  torsemide (DEMADEX) 20 MG tablet, Take 1 tablet (20 mg) by mouth 2 times daily  acetaminophen (TYLENOL) 500 MG tablet, Take 500-1,000 mg by mouth every 6 hours as needed for mild pain  gabapentin (NEURONTIN) 300 MG capsule, Take 1 capsule (300 mg) by mouth 3 times daily        PHYSICAL EXAMINATION:  Temp:  [98.3  F (36.8  C)-98.4  F (36.9  C)] 98.4  F (36.9  C)  Pulse:  [] 107  Resp:  [10-47] 20  BP: (128-200)/()  137/66  SpO2:  [93 %-100 %] 97 %  General: Sedated, but able to answer questions when prompted. Alert, oriented.  HEENT: Atraumatic, normocephalic  Neuro: A&Ox3, NAD  Pulm/Resp: Clear breath sounds bilaterally without rhonchi, crackles or wheeze, breathing non-labored  CV: Tachycardia, no LE swelling.  Abdomen: Soft, non-distended, non-tender.    LABS: Reviewed.   Arterial Blood Gases   No lab results found in last 7 days.  Complete Blood Count   Recent Labs   Lab 04/18/23  1736   WBC 10.8   HGB 12.2*        Basic Metabolic Panel  Recent Labs   Lab 04/19/23  0134 04/18/23  1736   NA  --  136   POTASSIUM  --  3.4   CHLORIDE  --  97*   CO2  --  27   BUN  --  11.1   CR  --  1.28*   * 98     Liver Function Tests  Recent Labs   Lab 04/18/23  1736   AST 22   ALT 21   ALKPHOS 120   BILITOTAL 0.9   ALBUMIN 4.2     Coagulation Profile  No lab results found in last 7 days.    IMAGING:  Recent Results (from the past 24 hour(s))   Abd/pelvis CT no contrast - Stone Protocol    Narrative    EXAM: CT ABDOMEN PELVIS W/O CONTRAST  LOCATION: [facility]  DATE/TIME: 4/18/2023 7:37 PM CDT    INDICATION: L flank pain  COMPARISON: None.  TECHNIQUE: CT scan of the abdomen and pelvis was performed without IV contrast. Multiplanar reformats were obtained. Dose reduction techniques were used.  CONTRAST: None.    FINDINGS:   LOWER CHEST: Mosaic attenuation at the lung bases to small airway disease.    HEPATOBILIARY: Normal.    PANCREAS: Normal.    SPLEEN: Normal.    ADRENAL GLANDS: Normal.    KIDNEYS/BLADDER: Right kidney within normal limits. There is a left subcapsular hematoma measuring approximately 9 x 4.5 cm which has mass effect upon the left kidney. Surrounding hemorrhage is noted in the anterior left perirenal space. No   hydronephrosis. No stones are identified.    BOWEL: No obstruction or inflammatory change.    LYMPH NODES: Normal.    VASCULATURE: Unremarkable.    PELVIC  ORGANS: Bladder within normal limits.    MUSCULOSKELETAL: Normal.      Impression    IMPRESSION:   1.  Large left subcapsular hematoma measuring approximately 9 x 4.5 cm which has mass effect upon the left kidney. Surrounding hemorrhage is noted in the anterior left perirenal space. No hydronephrosis. Can not evaluate for active extravasation due to   lack of intravenous contrast.    FIndings were discussed with Dr. Doran at 8:10 pm on 4/16/2023.        CT Abdomen Pelvis w Contrast   Result Value    Radiologist flags (AA)     Active extravasation from a vessel or major vascular injury    Narrative    EXAM: CT ABDOMEN PELVIS W CONTRAST  LOCATION: [facility]  DATE/TIME: 4/18/2023 8:52 PM CDT    INDICATION: Severe L flank pain  COMPARISON: Same date noncontrast CT.  TECHNIQUE: CT scan of the abdomen and pelvis was performed following injection of IV contrast. Multiplanar reformats were obtained. Dose reduction techniques were used.  CONTRAST: 135 mL Isovue 370    FINDINGS:   LOWER CHEST: Unremarkable.    HEPATOBILIARY: Normal.    PANCREAS: Normal.    SPLEEN: Normal.    ADRENAL GLANDS: Normal.    RIGHT KIDNEY: No hydronephrosis. 1.8 cm relatively hypodense lesion in the anterior lower pole with questionable enhancement in comparison to same day noncontrast CT (approximately 20 Hounsfield units).    LEFT KIDNEY: No hydronephrosis. Large subcapsular hematoma again noted with significant compression of the renal parenchyma. There is likely segmental infarction of a portion of the lower pole with relative hypoenhancement of additional areas of the   renal cortex. There is a likely pseudoaneurysm which follows the blood pool in the medial right upper pole measuring up to 2.3 cm (series 5 image 68). There is an additional focus of active hemorrhage extending from the lower pole into the subcapsular   hematoma which expands on delayed images. No suspicious enhancing renal mass.  There are small volume blood products outside of the subcapsular hematoma extending into the retroperitoneum, although there is no evidence of active hemorrhage in this space   during the exam.    URINARY BLADDER: Partially decompressed but otherwise unremarkable.    BOWEL: No obstruction or inflammatory change. Normal appendix.    LYMPH NODES: No suspicious lymphadenopathy.    VASCULATURE: Visualized portions of the left renal artery are patent without evidence of aneurysmal dilation or dissection. No significant atherosclerosis visualized.    PELVIC ORGANS: Normal.    MUSCULOSKELETAL: No acute bony abnormality.      Impression    IMPRESSION:   1.  Large left subcapsular hematoma with compression of the renal parenchyma and likely resultant segmental infarction in the left lower pole.  2.  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 Recommend consultation with   interventional radiology.  3.  Right renal lesion measuring 1.8 cm with questionable low-level enhancement, consider further evaluation with MRI on a nonemergent basis.      [Critical Result: Active extravasation from a vessel or major vascular injury]    Finding was identified on 4/18/2023 9:04 PM CDT.     Dr. Doran was contacted by me on 4/18/2023 9:11 PM CDT and verbalized understanding of the critical result.

## 2023-04-19 NOTE — IR NOTE
Patient Name: Miguel Ángel Wilson  Medical Record Number: [id number]  Today's Date: 4/18/2023    Procedure: Left renal angiogram and embolization  Proceduralist: Dr. Mclain, Dr. Hui  Pathology present: na    Procedure Start: 2250  Procedure end: 0108  Sedation medications administered:    Fentanyl 50 mcg   Versed 1 mg   Sedation time: 132 minutes (2256 - 0108)     Report given to: LONDON Meeks  : dre    Other Notes: Pt arrived to IR room 1 from ED via EMS with Nicardipine infusing. Consent reviewed and signed by patient. Pt denies any questions or concerns regarding procedure. Pt positioned supine and monitored per protocol.     Pt tolerated procedure without any noted complications.     Right Groin Access:  Angioseal deployed at 0107.  Bedrest x 2 hours.  Ambulation time: 0307      Pt transferred to .  2 bags of belongings and cell phone sent with patient.

## 2023-04-19 NOTE — CONSULTS
Urology Consult    Name: Miguel Ángel Wilson    MRN: [id number]   YOB: 1990               Chief Complaint:   Left subcapsular hematoma and 2.3cm pseudoaneurysm     History is obtained from the patient and chart review          History of Present Illness:   Miguel Ángel Wilson is a 32 year old male with PMHx of Aflutter with RVR on apixaban and HTN who presented with one day of left flank pain. It was initially mild but worsened when he was walking up the stairs that caused him to collapse to the floor. He then presented to the ED. In the ED he is AF, tachycardic to 110's, but significantly hypertensive to 200's systolic. His hgb is 12.2 (appears around baseline), creatinine is 1.28 (basline 1.6-1.8). A CT scan was obtained that demonstrated a large left subcapsular hematoma and pseudoaneurysm. IR was consulted who plan to take to him to the OR for embolization. Urology is consulted for assistance if embolization is not possible.     In the ED the patient is comfortable, but he recently had pain medications. He feels no pain currently. He denies trauma to he area and has never had this pain before.           Past Medical History:     Past Medical History:   Diagnosis Date     Atrial flutter with rapid ventricular response (H) 1/31/2022     Essential hypertension 1/31/2022     Hypertension      Morbid obesity (H) 1/31/2022     Tobacco use 1/31/2022            Past Surgical History:   History reviewed. No pertinent surgical history.         Social History:     Social History     Tobacco Use     Smoking status: Some Days     Smokeless tobacco: Never   Vaping Use     Vaping status: Not on file   Substance Use Topics     Alcohol use: No            Family History:   History reviewed. No pertinent family history.         Allergies:   No Known Allergies         Medications:     Current Facility-Administered Medications   Medication     HYDROmorphone (PF) (DILAUDID) injection 0.5 mg     niCARdipine 40 mg in 200 mL NS  (CARDENE) infusion     prothrombin 4 factor complex concentrate (KCENTRA) infusion 50 Units/kg (Dosing Weight)     sodium chloride 0.9% infusion     Current Outpatient Medications   Medication Sig     apixaban ANTICOAGULANT (ELIQUIS) 5 MG tablet Take 1 tablet (5 mg) by mouth 2 times daily     lisinopril (ZESTRIL) 40 MG tablet Take 1 tablet (40 mg) by mouth daily     metoprolol succinate ER (TOPROL-XL) 25 MG 24 hr tablet Take 1 tablet (25 mg) by mouth daily     torsemide (DEMADEX) 20 MG tablet Take 1 tablet (20 mg) by mouth 2 times daily     acetaminophen (TYLENOL) 500 MG tablet Take 500-1,000 mg by mouth every 6 hours as needed for mild pain     gabapentin (NEURONTIN) 300 MG capsule Take 1 capsule (300 mg) by mouth 3 times daily             Review of Systems:    ROS: 10 point ROS neg other than the symptoms noted above in the HPI           Physical Exam:   VS:  T: 98.3    HR: 109    BP: 176/135    RR: 18   GEN:  AOx3.  NAD.  Laying on gurney   CV:  Tachycardic   LUNGS: Non-labored breathing.   BACK:  No midline or CVA tenderness.  ABD:  Soft.  NT.  ND.  No rebound or guarding.  No masses.  EXT:  Warm, well perfused.    SKIN:  Warm.  Dry.  No rashes.  NEURO:  CN grossly intact.            Data:   All laboratory data reviewed:    Recent Labs   Lab 04/18/23  1736   WBC 10.8   HGB 12.2*        Recent Labs   Lab 04/18/23  1736      POTASSIUM 3.4   CHLORIDE 97*   CO2 27   BUN 11.1   CR 1.28*   GLC 98   EFREM 9.3     Recent Labs   Lab 04/18/23  1842   COLOR Light Yellow   APPEARANCE Clear   URINEGLC Negative   URINEBILI Negative   URINEKETONE Negative   SG 1.011   URINEPH 6.5   PROTEIN 20*   NITRITE Negative   LEUKEST Negative   RBCU 1   WBCU <1       All pertinent imaging reviewed:    CT scan of the abdomen:   IMPRESSION:   1.  Large left subcapsular hematoma with compression of the renal parenchyma and likely resultant segmental infarction in the left lower pole.  2.  Likely 2.3 cm pseudoaneurysm in the  medial aspect of the right upper pole with additional focus of active hemorrhage extending from the lower pole into the subcapsular hematoma with expansion on delayed images. Recommend consultation with   interventional radiology.  3.  Right renal lesion measuring 1.8 cm with questionable low-level enhancement, consider further evaluation with MRI on a nonemergent basis.     Renal ultrasound:               Impression and Plan:   Impression:   Miguel Ángel Wilson is a 32 year old male with HTN, Aflutter on eliquis now with a pseudoaneurysm and subcapsular bleed of the left kidney. He had no trauma or inciting event for the bleed, so possible this is from his uncontrolled HTN and anticoagulation. Other than his hypertensive, he is relatively stable otherwise. His hgb appears stable as well. Given the findings agree with IR embolization as surgical intervention could result in decompression of the abdomen and further bleeding/the patient to become unstable. However, should embolization be unsuccessful and the patient is unstable urology will be available for assistance, which would likely be a nephrectomy.       Plan:     - agree with IR embolization of the pseudoaneurysm  - please reach out to urology if further intervention is needed (likely nephrectomy); or questions arise    -                  This patient's exam findings, labs, and imaging discussed with urology staff surgeon [unfilled], who developed the treatment plan.    Bunny Galvan MD  Urology Resident

## 2023-04-19 NOTE — CONSULTS
Cardiology Consultation  Miguel Ángel Wilson [id number] 32 year old 1990 4/19/2023        Date of Admission: 4/18/2023  Requesting physician: Luke Hopkins MD       Reason for Consultation:   History of heart failure, severe hypertension         Assessment and Plan:   Miguel Ángel Wilson is a 32 year old male with PMHx of Atrial flutter, HTN, HFrEF, admitted with Left renal subcapsular hematoma s/p IR embolization 4/18 and hypertensive emergency with /154 on arrival.     #Hypertensive emergency   # HFrEF- last ECHO 1/2022- EF 30-35%  #Suspected Hyperaldosteronism  Mr Wilson is on Metoprolol, lisinopril and torsemide as outpatient, but is non compliant to the same. Was found to have /154 on arrival to the ED, was given Hydralazine 10mg x2 and then started on Nicardipine drip. Currently, BP controlled- 129/92. On chart review, Mr Wilson has had chronic hypokalemia and is currently also hypokalemia. Had been worked up for hyperaldosteronism in 2021, was found to have normal PAC, low PRA and high PAC/PRA ratio. Even though he has normal PAC, hig PAC /PRA ratio points to hyperaldosteronism and in view of  HFrEF, recommend to start him on spironolactone. Can continue his PTA meds- metoprolol, lisinopril, torsemide. Recommend ECHO (ordered for you) to evaluate heart function as last ECHO done 1/2022 with EF 30-35% with LVH.         Recommendations:   1. Recommend to start Spironolactone.  2. Continue PTA meds- lisinopril, metoprolol and torsemide.  3. Recommend ECHO  (ordered for you).  4. He should be referred to cardiology on discharge, he warrants further workup for his cardiomyopathy (such as cardiac MRI) but this should be done on an outpatient basis.    It has been a pleasure to participate in the care of this patient.  Patient discussed with attending Dr. Biggs.  Please do not hesitate to contact our service with any questions or concerns.     Anita Lang   Medical Student    Resident/Fellow Attestation    I, Migel Monroy MD, was present with the medical/JOSE ARMANDO student who participated in the service and in the documentation of the note.  I have verified the history and personally performed the physical exam and medical decision making.  I agree with the assessment and plan of care as documented in the note.      HFrEF likely due to poorly controlled hypertension but hasn't been worked up. Admitted with hypertensive urgency, with likely med non-compliance. Recommend restarting his home meds and consider adding aldactone given his prior workup suggestive of hyperaldo. Will need to monitor his potassium closely given CKD. Repeat Echo.    Migel Monroy MD  PGY6  Date of Service (when I saw the patient): 04/19/23           HPI:   Miguel Ángel Wilson is a 32 year old male with a PMHX significant for PMHx of Atrial flutter with RVR on apixaban , HTN, HFrEF, presented to the ED with complaints of severe left flank pain  And shortness of breath after walking up the stairs . On arrival to the ED was found to have high blood pressure 200/154. CTAP done which showed left renal subcapsular hematoma. Was taken for IR embolization yesterday. He mentions he has had intermittent chest pain since last year but has not followed up with a cardiologist. Currently denies chest pain, shortness of breath and lower extremity swelling. Denies headaches, dizziness, blurring of vision, nausea or vomiting.   In the ED was given Hydralazine 10mg x2 and then started on Nicardipine drip.         Past Medical History:     Past Medical History:   Diagnosis Date    Atrial flutter with rapid ventricular response (H) 1/31/2022    Essential hypertension 1/31/2022    Hypertension     Morbid obesity (H) 1/31/2022    Tobacco use 1/31/2022            Past Surgical History:   History reviewed. No pertinent surgical history.         Medications:     Medications Prior to Admission   Medication Sig Dispense Refill Last Dose    apixaban ANTICOAGULANT (ELIQUIS) 5  "MG tablet Take 1 tablet (5 mg) by mouth 2 times daily 60 tablet 0     lisinopril (ZESTRIL) 40 MG tablet Take 1 tablet (40 mg) by mouth daily 30 tablet 0     metoprolol succinate ER (TOPROL-XL) 25 MG 24 hr tablet Take 1 tablet (25 mg) by mouth daily 30 tablet 0     torsemide (DEMADEX) 20 MG tablet Take 1 tablet (20 mg) by mouth 2 times daily 60 tablet 0             Allergies    No Known Allergies         Social History:   Reviewed and edited as appropriate  Social History     Socioeconomic History    Marital status: Single     Spouse name: Not on file    Number of children: Not on file    Years of education: Not on file    Highest education level: Not on file   Occupational History    Not on file   Tobacco Use    Smoking status: Some Days    Smokeless tobacco: Never   Vaping Use    Vaping status: Not on file   Substance and Sexual Activity    Alcohol use: No    Drug use: No    Sexual activity: Not on file   Other Topics Concern    Not on file   Social History Narrative    Not on file     Social Determinants of Health     Financial Resource Strain: Not on file   Food Insecurity: Not on file   Transportation Needs: Not on file   Physical Activity: Not on file   Stress: Not on file   Social Connections: Not on file   Intimate Partner Violence: Not on file   Housing Stability: Not on file           Family History:   History reviewed. No pertinent family history.         Review of Systems:   A complete 10 point review of systems was obtained.  Please see the HPI for pertinent positives and negatives.  All other systems were reviewed and were found to be negative.          Physical Exam:   VS:  BP (!) 129/92 (BP Location: Left arm)   Pulse 96   Temp 98.3  F (36.8  C) (Oral)   Resp (!) 36   Ht 1.88 m (6' 2\")   Wt 135.8 kg (299 lb 6.2 oz)   SpO2 97%   BMI 38.44 kg/m      Wt:   Wt Readings from Last 2 Encounters:   04/19/23 135.8 kg (299 lb 6.2 oz)   03/10/22 140.2 kg (309 lb)        Physical Exam    GEN: alert, " comfortable, NAD  HEENT: EOMI,  anicteric sclera  CV: RRR, normal S1 S2, no m/g/r, no elevated JVP  RESP: lungs clear to auscultation - no rales, rhonchi or wheezes  ABDOMEN:  soft, nontender, nondistended, +BS  MSK: WWP. No pitting edema in b/l LE. No gross deformities noted  SKIN: no suspicious lesions or rashes on exposed skin  NEURO: Alert and oriented, moving all limbs spontaneously, mentation intact and speech normal  PSYCH: Appropriate mood and affect to match             Laboratory:   BMP  Recent Labs   Lab 04/19/23  0853 04/19/23  0436 04/19/23  0134 04/18/23  1736   NA  --  137  --  136   POTASSIUM  --  3.3*  --  3.4   CHLORIDE  --  99  --  97*   EFREM  --  8.7  --  9.3   CO2  --  25  --  27   BUN  --  12.5  --  11.1   CR  --  1.60*  --  1.28*   * 116* 116* 98     CBC  Recent Labs   Lab 04/19/23  0436 04/18/23  1736   WBC 13.9* 10.8   RBC 4.79 5.00   HGB 11.8* 12.2*   HCT 38.8* 40.0   MCV 81 80   MCH 24.6* 24.4*   MCHC 30.4* 30.5*   RDW 15.5* 15.3*    259     INR  Recent Labs   Lab 04/19/23  0436   INR 1.03     Liver Enzymes  Recent Labs   Lab 04/18/23  1736   ALKPHOS 120   AST 22   ALT 21   BILITOTAL 0.9   PROTTOTAL 7.3   ALBUMIN 4.2      PANCNo lab results found in last 7 days.     11/29/2021- Aldosterone 11.5, Renin 0.4     I saw and evaluated patient with CV fellow. I examined patient with CV fellow. I discussed the results with patient and CV fellow. I discussed our plan with patient and CV fellow.  I agree with CV fellow's note and I edited the CV fellow's note to make it a more comprehensive document.    Juan Biggs MD, PhD  Professor of Medicine  Division of Cardiology

## 2023-04-19 NOTE — PROGRESS NOTES
ICU Daily Rounding Checklist     Checklist Response Notes   Can sedation be reduced?  NA    Can analgesia be reduced? No    Is delirium being assessed, addressed and prevented? Yes  High DVPRS scores   Spontaneous awakening trial and/or Spontaneous breathing trial candidate?  NA    Total fluid balance goal reviewed?  Yes Net even   Is the patient at goals for lung protective ventilation? NA    Head of bed elevation (30 degrees)? Yes    Skin breakdown assessment (prevention) completed? Yes    Is enteral nutrition at goal? No NPO in setting of bleeding   Is blood glucose at goal? Yes    Deep venous thrombosis prophylaxis? No Subcapsular renal hematoma     Gastric ulcer prophylaxis?  No If coagulopathy (INR-1.5 PTT2x normal. Ph<50k), mechanical ventilation 48hr, history of GI bleed/ulcer within past year. TBL, SCI, or burn, or if >= 2 minor risk factors (sepsis, ICU stay 1 week, occult GI bleed > 6 days. glucocorticoid therapy, NSAID use, antiplatelet use)   Can Antibiotics be narrowed or discontinued? NA    Early mobility candidate and physical therapy consulted? Yes    Is barnhart catheter needed? No    Is central venous/arterial catheter needed? No    Has the family been updated? Yes Will update later   Are the patient's goals of care and code status current? Yes

## 2023-04-19 NOTE — PLAN OF CARE
Major Shift Events:      Neuro: Oriented x 4, drowsy but wakes up for cares and moving all extremities with 5/5 strengths. PERRL  CV: ST with HR 90s-110s. Afebrile. Able to wean down on the nicardipine, which is currently off. 60mg lasix given. SBP goal 130-150  Respiratory: On 2L NC while sleeping. Tachynpeic at times while asleep  GI/: Bowel sounds +. Voiding spontaneously via bedside urinal  Pain: L flank pain managed with PRN Dilaudid and tylenol  Skin: R groin site intact, + pulses and CMS in RLE, denies N/T    For vital signs and complete assessments, please see documentation flowsheets.     Admitted/transferred from: IR procedure  Reason for admission/transfer: Hypertensive monitoring and kidney extravasation. S/p IR embolization  2 RN skin assessment: completed by Caren RN, Michael RN  Result of skin assessment and interventions/actions: R groin site with transparent dressing intact  Height, weight, drug calc weight: DONE  Patient belongings (see Flowsheet): Cell phone at bedside, 2 bags belongings    ?

## 2023-04-20 LAB
ALBUMIN SERPL BCG-MCNC: 3.7 G/DL (ref 3.5–5.2)
ALP SERPL-CCNC: 92 U/L (ref 40–129)
ALT SERPL W P-5'-P-CCNC: 25 U/L (ref 10–50)
ANION GAP SERPL CALCULATED.3IONS-SCNC: 12 MMOL/L (ref 7–15)
AST SERPL W P-5'-P-CCNC: 43 U/L (ref 10–50)
BASOPHILS # BLD AUTO: 0 10E3/UL (ref 0–0.2)
BASOPHILS # BLD MANUAL: 0 10E3/UL (ref 0–0.2)
BASOPHILS NFR BLD AUTO: 0 %
BASOPHILS NFR BLD MANUAL: 0 %
BILIRUB SERPL-MCNC: 1.1 MG/DL
BUN SERPL-MCNC: 14.7 MG/DL (ref 6–20)
CALCIUM SERPL-MCNC: 8.9 MG/DL (ref 8.6–10)
CHLORIDE SERPL-SCNC: 96 MMOL/L (ref 98–107)
CREAT SERPL-MCNC: 1.65 MG/DL (ref 0.67–1.17)
DEPRECATED HCO3 PLAS-SCNC: 27 MMOL/L (ref 22–29)
EOSINOPHIL # BLD AUTO: 0 10E3/UL (ref 0–0.7)
EOSINOPHIL # BLD MANUAL: 0 10E3/UL (ref 0–0.7)
EOSINOPHIL NFR BLD AUTO: 0 %
EOSINOPHIL NFR BLD MANUAL: 0 %
ERYTHROCYTE [DISTWIDTH] IN BLOOD BY AUTOMATED COUNT: 15.6 % (ref 10–15)
ERYTHROCYTE [DISTWIDTH] IN BLOOD BY AUTOMATED COUNT: 15.8 % (ref 10–15)
GFR SERPL CREATININE-BSD FRML MDRD: 57 ML/MIN/1.73M2
GLUCOSE BLDC GLUCOMTR-MCNC: 145 MG/DL (ref 70–99)
GLUCOSE BLDC GLUCOMTR-MCNC: 147 MG/DL (ref 70–99)
GLUCOSE BLDC GLUCOMTR-MCNC: 150 MG/DL (ref 70–99)
GLUCOSE SERPL-MCNC: 94 MG/DL (ref 70–99)
HBA1C MFR BLD: 6.7 %
HCT VFR BLD AUTO: 36.3 % (ref 40–53)
HCT VFR BLD AUTO: 37.1 % (ref 40–53)
HGB BLD-MCNC: 11.1 G/DL (ref 13.3–17.7)
HGB BLD-MCNC: 11.7 G/DL (ref 13.3–17.7)
IMM GRANULOCYTES # BLD: 0.1 10E3/UL
IMM GRANULOCYTES NFR BLD: 1 %
LACTATE SERPL-SCNC: 0.9 MMOL/L (ref 0.7–2)
LYMPHOCYTES # BLD AUTO: 1.3 10E3/UL (ref 0.8–5.3)
LYMPHOCYTES # BLD MANUAL: 1.1 10E3/UL (ref 0.8–5.3)
LYMPHOCYTES NFR BLD AUTO: 8 %
LYMPHOCYTES NFR BLD MANUAL: 8 %
MCH RBC QN AUTO: 24.8 PG (ref 26.5–33)
MCH RBC QN AUTO: 24.8 PG (ref 26.5–33)
MCHC RBC AUTO-ENTMCNC: 30.6 G/DL (ref 31.5–36.5)
MCHC RBC AUTO-ENTMCNC: 31.5 G/DL (ref 31.5–36.5)
MCV RBC AUTO: 79 FL (ref 78–100)
MCV RBC AUTO: 81 FL (ref 78–100)
MONOCYTES # BLD AUTO: 2.2 10E3/UL (ref 0–1.3)
MONOCYTES # BLD MANUAL: 2.1 10E3/UL (ref 0–1.3)
MONOCYTES NFR BLD AUTO: 14 %
MONOCYTES NFR BLD MANUAL: 15 %
NEUTROPHILS # BLD AUTO: 12.7 10E3/UL (ref 1.6–8.3)
NEUTROPHILS # BLD MANUAL: 10.7 10E3/UL (ref 1.6–8.3)
NEUTROPHILS NFR BLD AUTO: 77 %
NEUTROPHILS NFR BLD MANUAL: 77 %
NRBC # BLD AUTO: 0 10E3/UL
NRBC # BLD AUTO: 0.1 10E3/UL
NRBC BLD AUTO-RTO: 0 /100
NRBC BLD MANUAL-RTO: 1 %
PLAT MORPH BLD: ABNORMAL
PLATELET # BLD AUTO: 217 10E3/UL (ref 150–450)
PLATELET # BLD AUTO: 229 10E3/UL (ref 150–450)
POTASSIUM SERPL-SCNC: 3.9 MMOL/L (ref 3.4–5.3)
PROCALCITONIN SERPL IA-MCNC: 0.22 NG/ML
PROT SERPL-MCNC: 6.8 G/DL (ref 6.4–8.3)
RBC # BLD AUTO: 4.47 10E6/UL (ref 4.4–5.9)
RBC # BLD AUTO: 4.72 10E6/UL (ref 4.4–5.9)
RBC MORPH BLD: ABNORMAL
SODIUM SERPL-SCNC: 135 MMOL/L (ref 136–145)
TROPONIN T SERPL HS-MCNC: 84 NG/L
WBC # BLD AUTO: 13.9 10E3/UL (ref 4–11)
WBC # BLD AUTO: 16.4 10E3/UL (ref 4–11)

## 2023-04-20 PROCEDURE — 250N000013 HC RX MED GY IP 250 OP 250 PS 637

## 2023-04-20 PROCEDURE — 84484 ASSAY OF TROPONIN QUANT: CPT

## 2023-04-20 PROCEDURE — 99207 PR NO BILLABLE SERVICE THIS VISIT: CPT | Performed by: CLINICAL NURSE SPECIALIST

## 2023-04-20 PROCEDURE — 250N000013 HC RX MED GY IP 250 OP 250 PS 637: Performed by: STUDENT IN AN ORGANIZED HEALTH CARE EDUCATION/TRAINING PROGRAM

## 2023-04-20 PROCEDURE — 250N000011 HC RX IP 250 OP 636: Performed by: STUDENT IN AN ORGANIZED HEALTH CARE EDUCATION/TRAINING PROGRAM

## 2023-04-20 PROCEDURE — 120N000003 HC R&B IMCU UMMC

## 2023-04-20 PROCEDURE — 84145 PROCALCITONIN (PCT): CPT

## 2023-04-20 PROCEDURE — 99233 SBSQ HOSP IP/OBS HIGH 50: CPT | Mod: FS | Performed by: CLINICAL NURSE SPECIALIST

## 2023-04-20 PROCEDURE — 80053 COMPREHEN METABOLIC PANEL: CPT

## 2023-04-20 PROCEDURE — 36415 COLL VENOUS BLD VENIPUNCTURE: CPT | Performed by: INTERNAL MEDICINE

## 2023-04-20 PROCEDURE — 250N000011 HC RX IP 250 OP 636

## 2023-04-20 PROCEDURE — 85025 COMPLETE CBC W/AUTO DIFF WBC: CPT

## 2023-04-20 PROCEDURE — 83605 ASSAY OF LACTIC ACID: CPT | Performed by: INTERNAL MEDICINE

## 2023-04-20 PROCEDURE — 83036 HEMOGLOBIN GLYCOSYLATED A1C: CPT | Performed by: INTERNAL MEDICINE

## 2023-04-20 RX ORDER — ACETAMINOPHEN 325 MG/1
650 TABLET ORAL EVERY 6 HOURS
Status: DISCONTINUED | OUTPATIENT
Start: 2023-04-20 | End: 2023-04-20

## 2023-04-20 RX ORDER — ACETAMINOPHEN 325 MG/1
650 TABLET ORAL EVERY 4 HOURS
Status: DISCONTINUED | OUTPATIENT
Start: 2023-04-20 | End: 2023-04-25 | Stop reason: HOSPADM

## 2023-04-20 RX ORDER — SPIRONOLACTONE 25 MG
12.5 TABLET ORAL DAILY
Status: DISCONTINUED | OUTPATIENT
Start: 2023-04-20 | End: 2023-04-23

## 2023-04-20 RX ORDER — OXYCODONE HYDROCHLORIDE 5 MG/1
5-10 TABLET ORAL EVERY 4 HOURS PRN
Status: DISCONTINUED | OUTPATIENT
Start: 2023-04-20 | End: 2023-04-21

## 2023-04-20 RX ORDER — TORSEMIDE 20 MG/1
20 TABLET ORAL
Status: DISCONTINUED | OUTPATIENT
Start: 2023-04-20 | End: 2023-04-25 | Stop reason: HOSPADM

## 2023-04-20 RX ADMIN — POLYETHYLENE GLYCOL 3350 17 G: 17 POWDER, FOR SOLUTION ORAL at 18:03

## 2023-04-20 RX ADMIN — METOPROLOL SUCCINATE 25 MG: 25 TABLET, EXTENDED RELEASE ORAL at 08:02

## 2023-04-20 RX ADMIN — OXYCODONE HYDROCHLORIDE 5 MG: 5 TABLET ORAL at 10:23

## 2023-04-20 RX ADMIN — METHOCARBAMOL 1000 MG: 500 TABLET ORAL at 14:11

## 2023-04-20 RX ADMIN — ACETAMINOPHEN 650 MG: 325 TABLET, FILM COATED ORAL at 14:11

## 2023-04-20 RX ADMIN — TORSEMIDE 20 MG: 20 TABLET ORAL at 10:27

## 2023-04-20 RX ADMIN — TORSEMIDE 20 MG: 20 TABLET ORAL at 15:58

## 2023-04-20 RX ADMIN — LABETALOL HYDROCHLORIDE 20 MG: 5 INJECTION, SOLUTION INTRAVENOUS at 20:09

## 2023-04-20 RX ADMIN — LISINOPRIL 40 MG: 20 TABLET ORAL at 08:02

## 2023-04-20 RX ADMIN — LABETALOL HYDROCHLORIDE 20 MG: 5 INJECTION, SOLUTION INTRAVENOUS at 14:11

## 2023-04-20 RX ADMIN — SPIRONOLACTONE 12.5 MG: 25 TABLET, FILM COATED ORAL at 14:11

## 2023-04-20 RX ADMIN — OXYCODONE HYDROCHLORIDE 10 MG: 5 TABLET ORAL at 23:15

## 2023-04-20 RX ADMIN — OXYCODONE HYDROCHLORIDE 5 MG: 5 TABLET ORAL at 02:04

## 2023-04-20 RX ADMIN — ACETAMINOPHEN 650 MG: 325 TABLET, FILM COATED ORAL at 10:23

## 2023-04-20 RX ADMIN — ACETAMINOPHEN 650 MG: 325 TABLET, FILM COATED ORAL at 02:03

## 2023-04-20 RX ADMIN — HYDROMORPHONE HYDROCHLORIDE 0.5 MG: 0.2 INJECTION, SOLUTION INTRAMUSCULAR; INTRAVENOUS; SUBCUTANEOUS at 06:12

## 2023-04-20 RX ADMIN — OXYCODONE HYDROCHLORIDE 5 MG: 5 TABLET ORAL at 14:11

## 2023-04-20 RX ADMIN — HYDROMORPHONE HYDROCHLORIDE 0.5 MG: 0.2 INJECTION, SOLUTION INTRAMUSCULAR; INTRAVENOUS; SUBCUTANEOUS at 08:48

## 2023-04-20 RX ADMIN — ACETAMINOPHEN 650 MG: 325 TABLET, FILM COATED ORAL at 06:12

## 2023-04-20 RX ADMIN — Medication 3 MG: at 21:03

## 2023-04-20 RX ADMIN — OXYCODONE HYDROCHLORIDE 10 MG: 5 TABLET ORAL at 18:03

## 2023-04-20 ASSESSMENT — ACTIVITIES OF DAILY LIVING (ADL)
ADLS_ACUITY_SCORE: 20

## 2023-04-20 NOTE — PROGRESS NOTES
Pt transferred to 6B appx. 06:00. Report given to Renetta Ortiz, chart, and medications sent with patient.

## 2023-04-20 NOTE — PROGRESS NOTES
Brief Cardiology Progress Note:    Chart reviewed. Not seen in person this AM. Blood pressure appears improved, but remains intermittently elevated. Addequate urine output. Normal sats on minimal O2. No changes to recommendations at this time. The general cardiology consult service will sign off. Please feel free to reach out should any new questions or concerns arise.     Discussed with the attending cardiologist, Dr. Biggs.     Migel Bond MD  Cardiology Fellow  [phone]

## 2023-04-20 NOTE — PROGRESS NOTES
[facility]    ICU Accept Note - Medicine Service, MAKENZIE TEAM        Date of Admission:  4/18/2023    Assessment & Plan   Miguel Ángel Wilson is a 32 year old male admitted on 4/18/2023 who is being transferred out of the ICU on 4/20/2023. He has a history of hypertension, atrial fibrillation, CKD, HFrEF who was admitted with left flank pain found to have subcapsular hematoma s/p IR embolization initially admitted to ICU for close hemodynamic monitoring and hypertension requiring nicardipine drip now stable for transfer to [facility].     Active issues:     #Acute renal hematoma and 2.3cm pseudoaneurysm   Patient presented w/ L flank pain found to have subcapsular hematoma on CT. No trauma or inciting event for the hematoma, though patient is on anticoagulation. Underwent IR embolization on 4/18. Repeat CTA on 4/19 demonstrated postprocedural changes of superior L renal artery embolization w/ persistent faint filling of pseudoaneurysm and resolution of area of contrast extravasation.   - Urology consulted, appreciate recommendations   - Follow-up recs for whether patient requires additional embolization given residual pseudoaneurysm    #Hypertension  #Hypertensive urgency  Patient with initial BP of 200/154 on admission, possibly related to non-adherence to medications as well as subcapsular hematoma. Has previously had some work up for secondary hypertension which showed possible hyperaldosteronism. Has not had sleep study for obstructive sleep apnea.  - Resume PTA metoprolol  - Nicardipine gtt started after hypertension refractory to 2x hydralazine 10mg IV               > gtt off 4/19 in AM  - PRN labetalol available if needed  - Consider addition of Spironolactone for hyperaldosteronism     #HFrEF, EF 30-35% (1/22)  Patient initially diagnosed with HFrEF in November of 2021 after presenting to [facility] with dyspnea and lower extremity edema. TTE showed LVEF of 45% and LVH,  discharged on lisinopril and carvedilol at that time. Pt. Presented to [facility] cardiology in January of 2022 with ongoing dyspnea and worsening LE edema. pBNP was 6,200 and troponin 141 --> 104 at that time. He underwent aggressive diuresis with bumex gtt with improvement of edema. Patient's carvedilol was replaced with metoprolol at that time and he was started on torsemide and apixaban in addition to his prior lisinopril. Now on this admission, patient's BNP found to be 2,256 and troponin of 26. EKG showed sinus rhythm with LV hypertrophy and QT prolongation. EF was 30-35% on 01/21/22. Patient was given a dose of 60mg IV lasix on admission. CXR without significant edema and patient without edema on exam. Low suspicion for CHF exacerbation.   - TTE on 4/19 EF 30-35%  - Resume PTA lisinopril, metoprolol  - Consider addition of Spironolactone  - arrange cardiology followup on discharge     #Atrial fibrillation  CHADSVASC 2.  - Continue PTA metoprolol  - Holding PTA apixaban      #Troponin elevation  Patient with reproducible chest pain on admission. EKG without concern for ACS. Troponin uptrending this admission. Suspect troponin elevation in the setting of demand ischemia 2/2 hypertensive urgency.  - AM troponin    #CKD  Unclear etiology of CKD, possibly in the setting of uncontrolled hypertension. Baseline creatinine appears to be 1.6-1.7 though patient has not had close followup. Creatinine appears to be around recent baseline.   -Renal consulted, appreciate recommendations    Plan for next 24 hours:  - Trend Hgb  - Follow-up Urology recommendations  - Consider addition of Spironolactone   - Trend troponin        ICU Transfer Checklist    YES NO Links/Additional comments   Current meds & orders reviewed & updated? [x] [] Transfer Navigator   O2 requirements appropriate for accepting floor? [x] [] O2 Device: Nasal cannula   Oxygen Delivery: 2 LPM   Appropriate antibiotics ordered? [x] [] Fever Report  30 Day Micro  "  Appropriate fluids ordered? [x] []    Appropriate diet ordered? [x] [] Regular Diet Adult   Telemetry indicated/ordered?    [x] [] Cardiac Monitoring: ACTIVE order. Indication: ICU     Appropriate DVT prophy ordered? [] [x] Anticoag/VTE  Padua 1- not indicated   Daugherty indicated/ordered?    [] [x] Not present   Central lines indicated? [] [x] LDA Avatar      PT/OT indicated/ordered? [] [x] D/C Planner  Rehab [facility]ordian  Not indicated   Code status reviewed? [x] [] Full Code   Preferred contact on file? [x] []      Clinically Significant Risk Factors        # Hypokalemia: Lowest K = 3.3 mmol/L in last 2 days, will replace as needed     # Hypomagnesemia: Lowest Mg = 1.4 mg/dL in last 2 days, will replace as needed             # Obesity: Estimated body mass index is 38.44 kg/m  as calculated from the following:    Height as of this encounter: 1.88 m (6' 2\").    Weight as of this encounter: 135.8 kg (299 lb 6.2 oz)., PRESENT ON ADMISSION         Disposition Plan     Expected Discharge Date: 04/20/2023                The patient will be formally staffed with the day team in the AM.     Nohemi Dinero MD  Medicine Service, [facility]  Securely message with [employer] (more info)  Text page via [facility] Paging/Directory   See signed in provider for up to date coverage information  ______________________________________________________________________    Interval History   Patient transferred out of the ICU. Denies any flank pain, hematuria, change in urinary frequency. Eager to go home to take care of his dogs. Denies headache, chest pain, shortness of breath, blurry vision, nausea, vomiting, lightheadedness.     Physical Exam   Vital Signs: Temp: 99.2  F (37.3  C) Temp src: Oral BP: (!) 150/103 Pulse: 102   Resp: 20 SpO2: 96 % O2 Device: Nasal cannula Oxygen Delivery: 2 LPM  Weight: 299 lbs 6.15 oz    Physical Exam  Constitutional:       General: He is not in acute " distress.  HENT:      Head: Normocephalic and atraumatic.   Eyes:      Extraocular Movements: Extraocular movements intact.   Cardiovascular:      Rate and Rhythm: Normal rate and regular rhythm.   Pulmonary:      Effort: Pulmonary effort is normal.      Breath sounds: Normal breath sounds.   Abdominal:      General: There is no distension.      Palpations: Abdomen is soft. There is no mass.      Tenderness: There is no abdominal tenderness. There is no right CVA tenderness or left CVA tenderness.   Skin:     General: Skin is warm and dry.   Neurological:      General: No focal deficit present.      Mental Status: He is alert. Mental status is at baseline.   Psychiatric:         Mood and Affect: Mood normal.         Behavior: Behavior normal.       Medical Decision Making       Data   Unresulted Labs Ordered in the Past 30 Days of this Admission     No orders found from 3/19/2023 to 4/19/2023.          I have personally reviewed the following data over the past 24 hrs:    13.9 (H)  \   11.7 (L)   / 217     N/A N/A N/A /  96   4.2 N/A N/A \       Trop: 87 (H) BNP: N/A       INR:  N/A PTT:  N/A   D-dimer:  N/A Fibrinogen:  N/A       Ferritin:  N/A % Retic:  N/A LDH:  N/A       Imaging results reviewed over the past 24 hrs:   Recent Results (from the past 24 hour(s))   XR Chest Port 1 View    Narrative    XR CHEST PORT 1 VIEW  4/19/2023 4:56 AM      HISTORY: Assess for pulmonary edema.    COMPARISON: Chest x-ray 1/22/2022.    FINDINGS:   Portable AP 60 degree upright chest x-ray.    Trachea is midline. Cardiac silhouette is enlarged. Pulmonary  vasculature is distinct. No focal airspace opacity, pleural effusion,  pneumothorax.    Bony structures appear intact.      Impression    IMPRESSION:   Stable cardiomegaly and mild pulmonary venous congestion. No focal  airspace opacities identified.    I have personally reviewed the examination and initial interpretation  and I agree with the findings.    JOSEPHINE PITTMAN MD          SYSTEM ID:  [id number]   CTA Abdomen Pelvis with Contrast    Narrative    Exam: Computed tomographic angiography of the abdomen and pelvis  without and with contrast, including 3D reformations dated 4/19/2023  10:07 AM    Clinical information:  Left renal subscapular hematoma with known  pseudoaneurysm superomedially, and an additional area of active  extravasation inferolaterally. At the time of attempted embolization  yesterday there were unable to identify the culprit vessel, therefore  nonselective embolization was performed.    Technique: Helical scans through the abdomen and pelvis obtained  before the administration of intravenous contrast media and following  the injection of contrast media  in the arterial and venous phases.  Source images reviewed as well as 3D and multi-planar reconstructions.    Contrast: iopamidol (ISOVUE-370) solution 100 mL    DLP: 5151 mGy*cm    Comparison study: CT 4/18/2023    FINDINGS:      Postprocedural changes of superior pole left renal artery coil  embolization. There is persistent faint filling of a pseudoaneurysm  measuring approximately 17 mm in the upper pole, medially, series 7  image 245. Wedge-shaped filling defects/infarcts involving  predominantly the inferior pole of the left kidney. Redemonstration of  a subcapsular hematoma with small amount of blood products in the  perirenal space extending into the right hemipelvis which is unchanged  in size since yesterday's CT when measured in similar fashion. The  previous active arterial extravasation from the inferolateral aspect  of the left kidney is no longer seen.     On the non-contrast images there is dense retention of contrast in the  left kidney from yesterday's imaging, consistent with a persistent  nephrogram, typically seen in the setting of significant ipsilateral  renal dysfunction.    The abdominal aorta is normal in caliber. The celiac axis, SMA and MILLY  are patent. The right renal artery is patent. The  non-embolized  portions of the left renal artery are patent including the main left  renal artery. The bilateral common iliac, external iliac, internal  iliac, and common femoral arteries are patent. Left arterial corona  mortis.    The splenic vein, portal vein, SMV and renal veins are  patent. The  hepatic veins and IVC are patent.  Bilateral common iliac, internal  iliac, and external iliac veins are patent.    Abdomen and pelvis:     Liver is unremarkable. Layering contrast within the gallbladder. No  intra or extrahepatic biliary ductal dilation. Adrenal glands,  pancreas and spleen are unremarkable. Left upper quadrant splenules.  Unchanged indeterminate 1.8 cm hypoattenuating lesion in the  anterior-inferior right kidney. No hydronephrosis or hydroureter. No  pelvic mass. The small and large bowel is normal in caliber. No  intra-abdominal free air. No enlarged abdominal or pelvic lymph nodes.    Lung bases: Bibasilar atelectasis and mosaic attenuation at the lung  bases. No pleural effusion.    Bones: No acute or aggressive appearing osseous lesion.      Impression    IMPRESSION:    1. Postprocedural changes of superior left renal artery embolization.  There is persistent faint filling of a pseudoaneurysm measuring  approximately 17 mm in the superior pole, medially.    2. Resolution of the previously seen additional area of contrast  extravasation adjacent to the inferolateral portion of the left  kidney.    3. Stable size of a subcapsular hematoma with small amount of blood  products in the perirenal space extending along the right paracolic  gutter into the pelvis.     4. Wedge-shaped filling defects/infarcts in the left kidney,  compatible with small infarcts    5. Stable indeterminate 1.8 cm right renal lesion. As previously  suggested, further evaluation with nonurgent renal ultrasound and/or  MRI is recommended.    I have personally reviewed the examination and initial interpretation  and I agree with  the findings.    CAITLIN JIM MD         SYSTEM ID:  [id number]   Echocardiogram Complete   Result Value    LVEF  30-35%    [facility]    [id number]  [id number]  [id number]  [id number]^JOSIE     [facility]  Echocardiography Laboratory  [address] [place] [address]     Name: PINO ARTEAGA  MRN: [id number]  : 1990  Study Date: 2023 11:40 AM  Age: 32 yrs  Gender: Male  Patient Location: [facility]  Reason For Study: Heart Failure  Ordering Physician: JOSIE MELLO  Performed By: Danny Wilson     BSA: 2.6 m2  Height: 74 in  Weight: 299 lb  BP: 123/88 mmHg  ______________________________________________________________________________  Procedure  Complete Portable Echo Adult. Contrast Optison. 4 ml wasted. Patient was given  5 ml mixture of 3 ml Optison and 6 ml saline.  ______________________________________________________________________________  Interpretation Summary  Left ventricular size is normal.  The visual ejection fraction is 30-35%.  Right ventricular function, chamber size, wall motion, and thickness are  normal.  Right ventricular systolic pressure is 39mmHg above the right atrial pressure.  IVC diameter and respiratory changes fall into an intermediate range  suggesting an RA pressure of 8 mmHg.  No pericardial effusion is present.  ______________________________________________________________________________  Left Ventricle  Left ventricular size is normal. Mild to moderate concentric wall thickening  consistent with left ventricular hypertrophy is present. The visual ejection  fraction is 30-35%. Left ventricular diastolic function is indeterminate.  Severe diffuse hypokinesis is present.     Right Ventricle  Right ventricular function, chamber size, wall motion, and thickness are  normal.     Atria  The right atria appears normal. Severe left atrial enlargement is present.     Mitral Valve  The mitral valve is normal. Trace mitral insufficiency  is present.     Aortic Valve  The valve leaflets are not well visualized. On Doppler interrogation, there is  no significant stenosis or regurgitation.     Tricuspid Valve  The tricuspid valve is normal. Trace to mild tricuspid insufficiency is  present. Right ventricular systolic pressure is 39mmHg above the right atrial  pressure.     Pulmonic Valve  The pulmonic valve is normal. Trace pulmonic insufficiency is present.     Vessels  The thoracic aorta is normal. The pulmonary artery and bifurcation cannot be  assessed. IVC diameter and respiratory changes fall into an intermediate range  suggesting an RA pressure of 8 mmHg.     Pericardium  No pericardial effusion is present.     ______________________________________________________________________________  MMode/2D Measurements & Calculations  IVSd: 1.7 cm  LVIDd: 5.3 cm  LVIDs: 4.4 cm  LVPWd: 1.5 cm     FS: 17.7 %  LV mass(C)d: 379.4 grams  LV mass(C)dI: 147.2 grams/m2  Ao root diam: 4.0 cm  asc Aorta Diam: 3.6 cm  LVOT diam: 2.4 cm  LVOT area: 4.6 cm2  LA Volume (BP): 129.0 ml  LA Volume Index (BP): 50.0 ml/m2  RWT: 0.55  TAPSE: 1.8 cm     Doppler Measurements & Calculations  MV E max omra: 78.8 cm/sec  MV A max omar: 53.1 cm/sec  MV E/A: 1.5  MV dec slope: 631.0 cm/sec2  MV dec time: 0.13 sec  PA acc time: 0.14 sec  TR max omar: 312.7 cm/sec  TR max P.1 mmHg  E/E' av.8  Lateral E/e': 10.1  Medial E/e': 13.6  RV S Omar: 12.2 cm/sec     ______________________________________________________________________________  Report approved by: Kailee Fine 2023 12:25 PM

## 2023-04-20 NOTE — PROGRESS NOTES
"Urology  Progress Note    S/p embolization by IR  CTA without active bleed  Hgb, vitals stable  Voiding spontaneously, without blood     Exam  /82 (BP Location: Left arm)   Pulse 89   Temp 98.4  F (36.9  C) (Oral)   Resp 20   Ht 1.88 m (6' 2\")   Wt 135.8 kg (299 lb 6.2 oz)   SpO2 95%   BMI 38.44 kg/m    No acute distress  Unlabored breathing  Abdomen soft,  appropriately tender, nondistended. Groin incision c/d/i  No CVA tenderness    UOP [id number]    Labs  Recent Labs   Lab Test 04/19/23  2135 04/19/23  1057 04/19/23  0436 04/18/23  1736 01/23/22  0700   WBC 13.9* 12.0* 13.9* 10.8 7.7   HGB 11.7* 12.1* 11.8* 12.2* 11.2*   CR  --   --  1.60* 1.28* 1.86*        Assessment/Plan  32 year old y/o male POD#2 s/p left renal artery embolization by IR. CTA obtained showing no active extravasation. Clinically stable. No indication for intervention and will continue to monitor for now.    - No intervention at this time, continue to monitor  - urology will follow peripherally and arrange outpatient follow up with imaging (follow up 6 wks w/ CT prior); please reach out with questions or concerns    Seen and examined with the chief resident. Will discuss with Dr. Cordova.       Contacting the Urology Team     Please use the following job codes to reach the Urology Team. Note that you must use an in house phone and that job codes cannot receive text pages.     On weekdays, dial [phone] (or star-star-star [phone] on the new [employer] telephones) then [phone] to reach the Adult Urology resident or PA on call    On weekdays, dial [phone] (or star-star-star [phone] on the new [employer] telephones) then [phone] to reach the Pediatric Urology resident    On weeknights and weekends, dial [phone] (or star-star-star [phone] on the new [employer] telephones) then [phone] to reach the Urology resident on call (for both Adult and Pediatrics)              "

## 2023-04-20 NOTE — CONSULTS
Interventional Radiology Consult Service Note    Patient is on IR schedule 4/24 for a left renal angiogram possible embolization, possible direct stick left renal PSA with anesthesia.   Labs WNL for procedure. COVID neg.    Orders for NPO and antibiotics have been entered.   Consent will be done prior to procedure.     Case discussed with Dr. Strong and Dr. Flores from IR and primary team. Case was also discussed directly between Dr. Cordova and Dr. Mclain 4/19. This is a 32 year old male with a history of hypertension, atrial fibrillation on AC, CKD, HFrEF who was admitted 4/18 with left flank pain found to have subcapsular hematoma s/p IR attempted embolization 4/18 initially admitted to ICU for close hemodynamic monitoring and hypertension requiring nicardipine drip now stable for transfer to [facility]. IR is following patient after the procedure. He remains HDS from bleeding standpoint. Hgb is stable. Repeat CTA 4/19 shows stable subcapsular hematoma. There is no sign of active bleeding. Imaging and hypertension suggestive of page phenomenon. Urology is reportedly not recommending surgical intervention. IR does recommend repeat intervention for left renal PSA. Would attempt with GA given patient's inability to breath hold during attempted embolization 4/18. If angiogram is again negative, may consider direct puncture of PSA with glue embolization. CTA could be repeated the AM of scheduled IR procedure to see if the PSA is still filling with contrast and re-intervention is warranted.    Patient was seen bedside with IR staff, Dr. Strong. It was relayed that the natural history of an arterial pseudoaneurysm is unknown. This PSA does need to be treated as there is risk of rupture. Patient should remain in the hospital until we can attempt treatment or we have imaging that shows this is thrombosed/no longer filling with contrast. Patient confirms understanding of the risk of severe bleeding and death if he  "leaves the hospital against medical advice without treatment of this PSA.    Expected date of discharge: TBD, recommend patient remain in the hospital until after his repeat IR procedure.     Vitals:   BP (!) 154/111   Pulse 112   Temp 98.2  F (36.8  C) (Oral)   Resp 16   Ht 1.88 m (6' 2\")   Wt 135.8 kg (299 lb 6.2 oz)   SpO2 94%   BMI 38.44 kg/m      Pertinent Labs:     Lab Results   Component Value Date    WBC 16.4 (H) 04/20/2023    WBC 13.9 (H) 04/19/2023    WBC 12.0 (H) 04/19/2023    WBC 6.6 03/13/2016       Lab Results   Component Value Date    HGB 11.1 04/20/2023    HGB 11.7 04/19/2023    HGB 12.1 04/19/2023    HGB 13.2 03/13/2016       Lab Results   Component Value Date     04/20/2023     04/19/2023     04/19/2023     03/13/2016       Lab Results   Component Value Date    INR 1.03 04/19/2023    PTT 33 04/19/2023       Lab Results   Component Value Date    POTASSIUM 3.9 04/20/2023    POTASSIUM 3.1 (L) 01/23/2022    POTASSIUM 3.9 03/13/2016        ASHWINI Lee CNP  Interventional Radiology  Pager: [phone]    "

## 2023-04-20 NOTE — PROGRESS NOTES
Transfer  Transferred from: 4A  Via: bed  Reason for transfer: Pt appropriate for 6B- improved patient condition  Belongings: Received with pt  Chart: Received with pt  Medications: Meds received from old unit with pt  Code Status verified on armband: yes  2 RN Skin Assessment Completed By: Roque ROBIN And Renetta ROBIN  Med rec completed: yes  Bed surface reassessed with algorithm and charted: yes  New bed surface ordered: no  Suction/Ambu bag/Flowmeter at bedside: yes    Report received from: Praveen SANDOVAL RN  Pt status: Patient complaining of 10/10 pain in L flank. IV dilaudid and tylenol given. Hypertensive- /107, . 2L NC, satting >92%. A&Ox4. R groin site WNL.

## 2023-04-20 NOTE — PROGRESS NOTES
Nephrology Progress Note  04/20/2023       Miguel Ángel Wilson is a 32 yom with longstanding uncontrolled HTN, a-fib, CKD, CHF admitted for L flank pain with imaging revealing subcapular hematoma and pseudoaneurysm.  Went for IR embolization early am 4/19.  Baseline Cr 1.6-1.8 since at least 2021, was 1.8 on admit and has been stable within his baseline.  Pt having severe HTN, Nephrology consulted for management of HTN in setting of page kidney.      Interval History :   Mr Wilson's Cr is surprisingly stable at baseline of ~1.6 despite acute issues requiring contrast and IR embolization the past 48h, no intervention needed for renal fx today.  From HTN standpoint is at goal of -150 with metoprolol XL, Lisinopril and PRN's.  Question of hyperaldo has come up, had features of hypoaldo in 2021 (hypokalemia, elevated renin/christina ratio) and strong family hx suggesting spironolactone would be of benefit.  Also has indication for spironolactone due to HFrEF, starting at 12.5mg and watching for hyperkalemia. Has never seen nephrology in clinic so will plan to schedule in CKD clinic on discharge.      Assessment & Recommendations:   Hypertension-As outpt is on Lisinopril 40mg daily, Metoprolol XL 25mg daily and torsemide for heart failure.  Ran out of meds (see pharmacy note but more meds would have ran out in ~Jan 2023 based on refill dates) so unclear if his current HTN on admit is his baseline or worse due to subcapsular bleeding/page kidney.  Restarted on Lisinopril and Metoprolol with PRN hydral and labetalol and BP's are at goal of -150 for now.  Was on Nicardipine for ~12h but now weaned off.                 -Agree with resumption of home meds                               -Lisinopril 40mg                               -Metoprolol XL 25mg daily.                                -PRN hydral and labetalol but would try to keep SBP >120 given his baseline BP's.          -Adding spironolactone 12.5mg today for CHF and  "? Hyperaldo.                               -ECHO suggested euvolemic on 4/19, EF unchanged at 30-35%.      CKD with L Subcapsular bleeding-Baseline Cr 1.6-1.8 since at least 11/2021, surprisingly at baseline despite acute issues with L kidney.  Went to IR for embolization this am, getting repeat imaging to evaluate for ongoing bleeding, surgery following for possible need for nephrectomy if he continues to bleed.  Would anticipate Cr may bump with multiple contrast loads for interventions.                  -Cr at baseline, likely to have YOGI in coming days.                  -Will need follow up in CKD clinic, has never seen nephrology.       Volume-No edema on exam, polyuric. ECHO showed normal IVC on 4/19.     HFrEF-EF 30-35% in Jan, rechecking today.  On Coreg and Lisinopril PTA.       Electrolytes-K 3.9, bicarb 27, Na 135.       BMD-Ca 8.9, Mg 2.1, Phos 3.2 last check.       Anemia-Hgb 11.1 at baseline.       Nutrition-Taking PO PTA, NPO for procedures for now.       Time spent: 40 minutes on this date of encounter for chart review, physical exam, medical decision making and co-ordination of care.      Discussed with Dr He     Recommendations were communicated to primary team via verbal communication.        Albert Lee, ASHWINI CNS  Clinical Nurse Specialist  [phone]    Review of Systems:   I reviewed the following systems:  Gen: No fevers or chills  CV: No CP at rest  Resp: No SOB at rest  GI: No N/V    Physical Exam:   I/O last 3 completed shifts:  In: 900 [P.O.:900]  Out: 3775 [Urine:3775]   BP (!) 135/96 (BP Location: Left arm, Cuff Size: Adult Large)   Pulse 96   Temp 98.2  F (36.8  C) (Oral)   Resp 24   Ht 1.88 m (6' 2\")   Wt 135.8 kg (299 lb 6.2 oz)   SpO2 95%   BMI 38.44 kg/m       GENERAL APPEARANCE: Obese male, alert and no distress  EYES: No scleral icterus  NECK:  No JVD  Pulmonary: lungs clear to auscultation with equal breath sounds bilaterally, no clubbing or cyanosis  CV: Regular " rhythm, normal rate, no rub   - Edema none  GI: soft, nontender, normal bowel sounds  MS: no evidence of inflammation in joints, no muscle tenderness  : No Daugherty  SKIN: no rash, warm, dry  NEURO: mentation intact and speech normal    Labs:   All labs reviewed by me  Electrolytes/Renal - Recent Labs   Lab Test 04/20/23  0805 04/20/23  0534 04/19/23  1627 04/19/23  1444 04/19/23  1252 04/19/23  1057 04/19/23  0853 04/19/23  0436 04/19/23  0134 04/18/23  1736 01/23/22  0700 01/23/22  0700   NA  --  135*  --   --   --   --   --  137  --  136  --  137   POTASSIUM  --  3.9  --   --   --  4.2  --  3.3*  --  3.4   < > 3.1*   CHLORIDE  --  96*  --   --   --   --   --  99  --  97*  --  95   CO2  --  27  --   --   --   --   --  25  --  27  --  34*   BUN  --  14.7  --   --   --   --   --  12.5  --  11.1  --  21   CR  --  1.65*  --   --   --   --   --  1.60*  --  1.28*  --  1.86*   * 94 96  --    < >  --    < > 116*   < > 98   < > 92   EFREM  --  8.9  --   --   --   --   --  8.7  --  9.3  --  8.2*   MAG  --   --   --  2.1  --   --   --  1.4*  --   --   --  2.0   PHOS  --   --   --   --   --   --   --  3.2  --   --   --   --     < > = values in this interval not displayed.       CBC -   Recent Labs   Lab Test 04/20/23  0534 04/19/23  2135 04/19/23  1057   WBC 16.4* 13.9* 12.0*   HGB 11.1* 11.7* 12.1*    217 210       LFTs -   Recent Labs   Lab Test 04/20/23  0534 04/18/23  1736 01/21/22 2045   ALKPHOS 92 120 119   BILITOTAL 1.1 0.9 0.9   ALT 25 21 60   AST 43 22 35   PROTTOTAL 6.8 7.3 6.8   ALBUMIN 3.7 4.2 2.6*       Iron Panel -   Recent Labs   Lab Test 04/19/23  0502 04/19/23  0436   IRON 57*  --    IRONSAT 17  --    HUSSEIN  --  57           Current Medications:    lisinopril  40 mg Oral Daily     melatonin  3 mg Oral At Bedtime     metoprolol succinate ER  25 mg Oral Daily     sodium chloride (PF)  3 mL Intracatheter Q8H     spironolactone  12.5 mg Oral Daily     torsemide  20 mg Oral BID         Sara JACOBS  Neri, saw and evaluated this patient as part of a shared visit.  I have reviewed and discussed with the advanced practice provider their history, physical and plan.  I have personally performed the substantive portion of the medical decision making for this visit - please see the JOSE ARMANDO's documentation for the full details  I personally reviewed the vital signs, medications, labs and imaging.    Key findings and management decisions made by me:  Hypertensive emergency, high christina / renin ratio noted in 2021, will start spironolactone    Sara Ac He  Date of Service (when I saw the patient): 4/20

## 2023-04-20 NOTE — PLAN OF CARE
Neuro: A&Ox4.   Cardiac: SR-ST . Blood pressure outside of parameters around 1400, gave PRN labetalol which improved blood pressure.    Respiratory: Sating > 90% on RA while awake, requires 2 liters while sleeping.  GI/: Adequate urine output. No BM this shift.   Diet/appetite: Tolerating regular diet. Eating well.  Activity:  SBA  Pain: At acceptable level on current regimen.   Skin: No new deficits noted.  LDA's: PIV x 2    Plan: Continue with POC. Notify primary team with changes.

## 2023-04-20 NOTE — CONSULTS
Care Management Follow Up    Length of Stay (days): 2    Expected Discharge Date:       Concerns to be Addressed:       Patient plan of care discussed at interdisciplinary rounds: Yes    Anticipated Discharge Disposition:       Anticipated Discharge Services:    Anticipated Discharge DME:      Patient/family educated on Medicare website which has current facility and service quality ratings:    Education Provided on the Discharge Plan:    Patient/Family in Agreement with the Plan:      Referrals Placed by CM/SW:    Private pay costs discussed: Not applicable    Additional Information:  12:10 SW went to see patient and ask what I can help with: he said he cannot work and cannot afford his rent. He had applied for Disability and was denied. This SW suggests that he reapply again now that he has a worsening condition and dual diagnosis. SW also sent Margret from financial counseling a message to see if they are able to aid this process or if it is outside of their scope. Miguel Ángel said his rent is $600 and hasn't told his boss he is here and has been here for two days. SW clarified that he would have to be the one who calls his boss and he should ask about if there is anything for short term disability or a half paid work leave.     SW will come back with resources in the afternoon. Patient will nap and then call boss.    4:21 SW went to patients [place] or he can call [phone] to get help filling out application or [phone] to ask generic help questions. If he gets denied the emergency help with rent he can call [employer] at [phone]. CHW will get clothes from [employer] tomorrow if possible.        Howie Wyman, ELVIA, LGSW  Float   Covering 6B   Ph: [phone]

## 2023-04-21 ENCOUNTER — ANESTHESIA EVENT (OUTPATIENT)
Dept: SURGERY | Facility: CLINIC | Age: 33
End: 2023-04-21
Payer: COMMERCIAL

## 2023-04-21 LAB
ANION GAP SERPL CALCULATED.3IONS-SCNC: 12 MMOL/L (ref 7–15)
BASOPHILS # BLD AUTO: 0.1 10E3/UL (ref 0–0.2)
BASOPHILS NFR BLD AUTO: 0 %
BUN SERPL-MCNC: 17.8 MG/DL (ref 6–20)
CALCIUM SERPL-MCNC: 8.7 MG/DL (ref 8.6–10)
CHLORIDE SERPL-SCNC: 95 MMOL/L (ref 98–107)
CREAT SERPL-MCNC: 1.63 MG/DL (ref 0.67–1.17)
DEPRECATED HCO3 PLAS-SCNC: 27 MMOL/L (ref 22–29)
EOSINOPHIL # BLD AUTO: 0.1 10E3/UL (ref 0–0.7)
EOSINOPHIL NFR BLD AUTO: 0 %
ERYTHROCYTE [DISTWIDTH] IN BLOOD BY AUTOMATED COUNT: 15.9 % (ref 10–15)
GFR SERPL CREATININE-BSD FRML MDRD: 57 ML/MIN/1.73M2
GLUCOSE BLDC GLUCOMTR-MCNC: 105 MG/DL (ref 70–99)
GLUCOSE SERPL-MCNC: 97 MG/DL (ref 70–99)
HCT VFR BLD AUTO: 37.7 % (ref 40–53)
HGB BLD-MCNC: 11.4 G/DL (ref 13.3–17.7)
IMM GRANULOCYTES # BLD: 0.1 10E3/UL
IMM GRANULOCYTES NFR BLD: 1 %
LACTATE SERPL-SCNC: 1.4 MMOL/L (ref 0.7–2)
LYMPHOCYTES # BLD AUTO: 1.3 10E3/UL (ref 0.8–5.3)
LYMPHOCYTES NFR BLD AUTO: 8 %
MAGNESIUM SERPL-MCNC: 1.7 MG/DL (ref 1.7–2.3)
MCH RBC QN AUTO: 24.9 PG (ref 26.5–33)
MCHC RBC AUTO-ENTMCNC: 30.2 G/DL (ref 31.5–36.5)
MCV RBC AUTO: 82 FL (ref 78–100)
MONOCYTES # BLD AUTO: 2.1 10E3/UL (ref 0–1.3)
MONOCYTES NFR BLD AUTO: 13 %
NEUTROPHILS # BLD AUTO: 12.8 10E3/UL (ref 1.6–8.3)
NEUTROPHILS NFR BLD AUTO: 78 %
NRBC # BLD AUTO: 0 10E3/UL
NRBC BLD AUTO-RTO: 0 /100
PHOSPHATE SERPL-MCNC: 3.5 MG/DL (ref 2.5–4.5)
PLATELET # BLD AUTO: 240 10E3/UL (ref 150–450)
POTASSIUM SERPL-SCNC: 4 MMOL/L (ref 3.4–5.3)
RBC # BLD AUTO: 4.58 10E6/UL (ref 4.4–5.9)
SODIUM SERPL-SCNC: 134 MMOL/L (ref 136–145)
WBC # BLD AUTO: 16.5 10E3/UL (ref 4–11)

## 2023-04-21 PROCEDURE — 84100 ASSAY OF PHOSPHORUS: CPT

## 2023-04-21 PROCEDURE — 83735 ASSAY OF MAGNESIUM: CPT

## 2023-04-21 PROCEDURE — 250N000011 HC RX IP 250 OP 636

## 2023-04-21 PROCEDURE — 250N000013 HC RX MED GY IP 250 OP 250 PS 637: Performed by: STUDENT IN AN ORGANIZED HEALTH CARE EDUCATION/TRAINING PROGRAM

## 2023-04-21 PROCEDURE — 250N000013 HC RX MED GY IP 250 OP 250 PS 637

## 2023-04-21 PROCEDURE — 99232 SBSQ HOSP IP/OBS MODERATE 35: CPT | Mod: GC | Performed by: INTERNAL MEDICINE

## 2023-04-21 PROCEDURE — 36415 COLL VENOUS BLD VENIPUNCTURE: CPT | Performed by: INTERNAL MEDICINE

## 2023-04-21 PROCEDURE — 83605 ASSAY OF LACTIC ACID: CPT | Performed by: INTERNAL MEDICINE

## 2023-04-21 PROCEDURE — 99207 PR NO BILLABLE SERVICE THIS VISIT: CPT | Performed by: CLINICAL NURSE SPECIALIST

## 2023-04-21 PROCEDURE — 85025 COMPLETE CBC W/AUTO DIFF WBC: CPT

## 2023-04-21 PROCEDURE — 99233 SBSQ HOSP IP/OBS HIGH 50: CPT | Mod: FS | Performed by: CLINICAL NURSE SPECIALIST

## 2023-04-21 PROCEDURE — 80048 BASIC METABOLIC PNL TOTAL CA: CPT

## 2023-04-21 PROCEDURE — 120N000003 HC R&B IMCU UMMC

## 2023-04-21 PROCEDURE — 36415 COLL VENOUS BLD VENIPUNCTURE: CPT

## 2023-04-21 RX ORDER — SPIRONOLACTONE 25 MG/1
12.5 TABLET ORAL DAILY
Qty: 90 TABLET | Refills: 1 | Status: SHIPPED | OUTPATIENT
Start: 2023-04-22 | End: 2023-04-24

## 2023-04-21 RX ORDER — POLYETHYLENE GLYCOL 3350 17 G/17G
17 POWDER, FOR SOLUTION ORAL DAILY
Status: DISCONTINUED | OUTPATIENT
Start: 2023-04-21 | End: 2023-04-23

## 2023-04-21 RX ORDER — LISINOPRIL 40 MG/1
40 TABLET ORAL DAILY
Qty: 90 TABLET | Refills: 3 | Status: SHIPPED | OUTPATIENT
Start: 2023-04-21 | End: 2023-04-23

## 2023-04-21 RX ORDER — OXYCODONE HYDROCHLORIDE 5 MG/1
5-10 TABLET ORAL EVERY 4 HOURS PRN
Status: DISCONTINUED | OUTPATIENT
Start: 2023-04-21 | End: 2023-04-23

## 2023-04-21 RX ORDER — OXYCODONE HYDROCHLORIDE 5 MG/1
5-10 TABLET ORAL EVERY 6 HOURS PRN
Status: DISCONTINUED | OUTPATIENT
Start: 2023-04-21 | End: 2023-04-21

## 2023-04-21 RX ORDER — SENNOSIDES 8.6 MG
8.6 TABLET ORAL 2 TIMES DAILY
Status: DISCONTINUED | OUTPATIENT
Start: 2023-04-21 | End: 2023-04-23

## 2023-04-21 RX ADMIN — ACETAMINOPHEN 650 MG: 325 TABLET, FILM COATED ORAL at 19:53

## 2023-04-21 RX ADMIN — TORSEMIDE 20 MG: 20 TABLET ORAL at 15:26

## 2023-04-21 RX ADMIN — OXYCODONE HYDROCHLORIDE 10 MG: 5 TABLET ORAL at 14:23

## 2023-04-21 RX ADMIN — ACETAMINOPHEN 650 MG: 325 TABLET, FILM COATED ORAL at 23:49

## 2023-04-21 RX ADMIN — LISINOPRIL 40 MG: 20 TABLET ORAL at 08:02

## 2023-04-21 RX ADMIN — METHOCARBAMOL 1000 MG: 500 TABLET ORAL at 15:28

## 2023-04-21 RX ADMIN — LABETALOL HYDROCHLORIDE 20 MG: 5 INJECTION, SOLUTION INTRAVENOUS at 21:36

## 2023-04-21 RX ADMIN — METOPROLOL SUCCINATE 25 MG: 25 TABLET, EXTENDED RELEASE ORAL at 08:02

## 2023-04-21 RX ADMIN — TORSEMIDE 20 MG: 20 TABLET ORAL at 08:02

## 2023-04-21 RX ADMIN — SPIRONOLACTONE 12.5 MG: 25 TABLET, FILM COATED ORAL at 08:03

## 2023-04-21 RX ADMIN — Medication 3 MG: at 21:36

## 2023-04-21 RX ADMIN — ACETAMINOPHEN 650 MG: 325 TABLET, FILM COATED ORAL at 08:02

## 2023-04-21 RX ADMIN — METHOCARBAMOL 1000 MG: 500 TABLET ORAL at 02:22

## 2023-04-21 RX ADMIN — ACETAMINOPHEN 650 MG: 325 TABLET, FILM COATED ORAL at 14:23

## 2023-04-21 RX ADMIN — SENNOSIDES 8.6 MG: 8.6 TABLET, FILM COATED ORAL at 19:53

## 2023-04-21 RX ADMIN — OXYCODONE HYDROCHLORIDE 10 MG: 5 TABLET ORAL at 19:53

## 2023-04-21 RX ADMIN — OXYCODONE HYDROCHLORIDE 10 MG: 5 TABLET ORAL at 03:16

## 2023-04-21 RX ADMIN — SENNOSIDES 8.6 MG: 8.6 TABLET, FILM COATED ORAL at 14:23

## 2023-04-21 RX ADMIN — LABETALOL HYDROCHLORIDE 20 MG: 5 INJECTION, SOLUTION INTRAVENOUS at 15:39

## 2023-04-21 RX ADMIN — OXYCODONE HYDROCHLORIDE 10 MG: 5 TABLET ORAL at 08:02

## 2023-04-21 RX ADMIN — OXYCODONE HYDROCHLORIDE 10 MG: 5 TABLET ORAL at 23:52

## 2023-04-21 RX ADMIN — POLYETHYLENE GLYCOL 3350 17 G: 17 POWDER, FOR SOLUTION ORAL at 14:24

## 2023-04-21 ASSESSMENT — ACTIVITIES OF DAILY LIVING (ADL)
ADLS_ACUITY_SCORE: 20
ADLS_ACUITY_SCORE: 18
ADLS_ACUITY_SCORE: 20

## 2023-04-21 NOTE — PLAN OF CARE
Neuro: A&Ox4. Pleasant, able to make needs known.  Cardiac: SR-ST 90s-100s. -180s, prn labatelol given x1.   Respiratory: Sating >95% on RA when awake, 2L NC when sleeping.   GI/: Adequate urine output. LBM prior to admission, prn miralax given.  Diet/appetite: Tolerating regular diet. Eating well.  Activity:  SBA. Encouraged ambulation and sitting in chair for meals - pt declined multiple times.  Pain: At acceptable level on current regimen.   Skin: No new deficits noted.  LDA's:  -R PIV x2: SL    Plan: Continue with POC. Notify primary team with changes.     Goal Outcome Evaluation: progressing

## 2023-04-21 NOTE — PLAN OF CARE
Neuro: A&Ox4. Emotionally labile. Pt nearly left AMA. Situation able to be de-escalated by RN  Cardiac: SR. VSS. Intermittently elevated BP, labetalol given PRN   Respiratory: Sating 98% on RA. 2L when asleep  GI/: Adequate urine output. No BM,bowel regimen started  Diet/appetite: Tolerating regular diet. Eating well.  Activity:  Assist of stand by, up to chair and in halls.  Pain: At acceptable level on current regimen.   Skin: No new deficits noted.  LDA's: PIV x1    Plan: Continue with POC. Notify primary team with changes.         Sepsis triggered, lactic 1.4.

## 2023-04-21 NOTE — PROGRESS NOTES
[facility]    Medicine Progress Note - Medicine Service, [facility] TEAM 1    Date of Admission:  4/18/2023    Assessment & Plan   Miguel Ángel Wilson is a 32 year old male admitted on 4/18/2023 who is being transferred out of the ICU on 4/20/2023. He has a history of hypertension, atrial fibrillation, CKD, HFrEF who was admitted with left flank pain found to have subcapsular hematoma s/p IR embolization initially admitted to ICU for close hemodynamic monitoring and hypertension requiring nicardipine drip. Transferred out of ICU on 4/21. Plan for left renal angiogram (w/ anesthesia) w/ possible embolization vs direct stick on 4/24.    Changes Today:  - Continued inpatient monitoring (risk of pseudoaneurysm rupture) until IR intervention on 4/24  - PCP referral placed  - Spaced out PRN oxycodone  - Scheduled bowel regimen    Acute Issues:  Left renal subcapsular hematoma s/p IR embolization (4/18)  Left renal pseudoaneurysm (17mm)  Patient presented w/ L flank pain found to have subcapsular hematoma on CT. No trauma or inciting event for the hematoma, though patient is on anticoagulation. Underwent IR embolization on 4/18 (unable to identify discrete vessel of extravasation, superior pole segmental vessel embolized, persistent pseudoaneurysm). Repeat CTA on 4/19 demonstrated postprocedural changes of superior L renal artery embolization w/ persistent faint filling of pseudoaneurysm and resolution of area of contrast extravasation.   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 EKG without concern for ACS. Patient ran out of lisinopril PTA. Prior hyperaldo workup (11/2021, although not AM sample): PAC 11.5, PRA 0.4, K 3.4.  - Nephrology following; recs appreciated   - Goal SBP > 120 (given longstanding HTN)  - s/p nicardipine gtt (4/18-4/19)  - Continue PTA lisinopril 40mg daily, metoprolol succinate 25mg BID  - Start spironolactone 12.5mg daily  - IV labetalol PRN for SBP > 180  - Sleep study as outpatient    Chronic/Stable/Resolved Issues:  HFrEF (EF 30-35%); not in acute exacerbation  Patient initially diagnosed with HFrEF in November of 2021 after presenting to [facility] with dyspnea and lower extremity edema. TTE showed LVEF of 45% and LVH, discharged on lisinopril and carvedilol at that time. Pt. Presented to [facility] cardiology in January of 2022 with ongoing dyspnea and worsening LE edema. pBNP was 6,200 and troponin 141 --> 104 at that time. He underwent aggressive diuresis with bumex gtt with improvement of edema. Patient's carvedilol was replaced with metoprolol at that time and he was started on torsemide and apixaban in addition to his prior lisinopril. Now on this admission, patient's BNP found to be 2,256 and troponin of 26. EKG showed sinus rhythm with LV hypertrophy and QT prolongation. EF was 30-35% on 01/21/22. Patient was given a dose of 60mg IV lasix on admission. CXR without significant edema and patient without edema on exam.   - Cardiology signed off; recs appreciated   - Needs outpatient follow up for further cardiomyopathy workup (eg, cMRI)  - GDMT: continue PTA lisinopril, metoprolol succinate; started spironolactone  - Continue PTA torsemide 20mg BID     Atrial fibrillation  CHADSVASC 2.  - Continue PTA metoprolol succinate  - Holding PTA apixaban      Demand ischemia in setting of hypertensive emergency; downtrended  Patient with reproducible chest  "pain on admission.      CKD  Unclear etiology of CKD, possibly in the setting of uncontrolled hypertension. Baseline creatinine appears to be 1.6-1.7 though patient has not had close follow up.  - Nephrology following; recs appreciated   - Anticipate in YOGI following contrast load   - Needs outpatient follow up       Diet: Regular Diet Adult  NPO per Anesthesia Guidelines for Procedure/Surgery Except for: Meds    DVT Prophylaxis: Pneumatic Compression Devices  Daugherty Catheter: Not present  Lines: None     Cardiac Monitoring: None  Code Status: Full Code      Clinically Significant Risk Factors                       # DMII: A1C = 6.7 % (Ref range: <5.7 %) within past 6 months, PRESENT ON ADMISSION  # Obesity: Estimated body mass index is 37.96 kg/m  as calculated from the following:    Height as of this encounter: 1.88 m (6' 2\").    Weight as of this encounter: 134.1 kg (295 lb 10.2 oz)., PRESENT ON ADMISSION         Disposition Plan      Expected Discharge Date: 04/25/2023        Discharge Comments: Repeat IR angio with anesthesia on 4/24.        The patient's care was discussed with the Attending Physician, Dr. Clay.    Nelda Martinez MD  Medicine Service, [facility] TEAM [address] [place] [address]  Securely message with [employer] (more info)  Text page via [facility] Paging/Directory   See signed in provider for up to date coverage information  ______________________________________________________________________    Interval History   No acute events overnight. Patient continues to endorse left sided abdominal pain; denies flank pain. No BM since admission. Denies chest pain, SOB, urinary sxs. Breathing feels comfortable. Satting well on RA.    Physical Exam   Vital Signs: Temp: 98.4  F (36.9  C) Temp src: Oral BP: (!) 167/109 Pulse: 95   Resp: 22 SpO2: 99 % O2 Device: Nasal cannula Oxygen Delivery: 2 LPM  Weight: 295 lbs 10.19 oz    Constitutional: NAD. Conversant. Generally " well-appearing.   HEENT: NC/AT, anicteric sclera, pupils round and equal, EOMI.  CV: RRR, normal S1/S2, no murmurs/gallops/rubs.  Pulm: Non-labored respirations on room air, CTAB, no wheezes or crackles.  Abdomen: Tenderness to deep palpation along left lateral portion of abdomen. Normoactive bowel sounds, soft, non-distended, non-tender. No CVA tenderness.  Extremities: Warm and well-perfused, no peripheral edema, cyanosis, or clubbing.  Skin: No jaundice, no rashes on exposed skin.  Neuro: Alert and oriented x3, moves all four extremities independently.  Psych: Normal affect.    Medical Decision Making       Data     I have personally reviewed the following data over the past 24 hrs:    16.5 (H)  \   11.4 (L)   / 240     134 (L) 95 (L) 17.8 /  97   4.0 27 1.63 (H) \       Procal: N/A CRP: N/A Lactic Acid: 0.9

## 2023-04-21 NOTE — PROGRESS NOTES
Nephrology Progress Note  04/21/2023       Miguel Ángel Wilson is a 32 yom with longstanding uncontrolled HTN, a-fib, CKD, CHF admitted for L flank pain with imaging revealing subcapular hematoma and pseudoaneurysm.  Went for IR embolization early am 4/19.  Baseline Cr 1.6-1.8 since at least 2021, was 1.8 on admit and has been stable within his baseline.  Pt having severe HTN, Nephrology consulted for management of HTN in setting of page kidney.      Interval History :   Mr Wilson's Cr is stable at his baseline of 1.6 despite acute issues, is planned for repeat imaging of L kidney +/- intervention in IR on 4/24 depending on findings.  SBP's in 160's this am, just started spironolactone yesterday which should help in coming days.      Assessment & Recommendations:   Hypertension-As outpt is on Lisinopril 40mg daily, Metoprolol XL 25mg daily and torsemide for heart failure.  Ran out of meds (see pharmacy note but more meds would have ran out in ~Jan 2023 based on refill dates) so unclear if his current HTN on admit is his baseline or worse due to subcapsular bleeding/page kidney.  Restarted on Lisinopril and Metoprolol with PRN hydral and labetalol and BP's are at goal of -150 for now.  Was on Nicardipine for ~12h but now weaned off.                 -Agree with resumption of home meds                               -Lisinopril 40mg                               -Metoprolol XL 25mg daily.                                -Added spironolactone 12.5mg today for CHF and ? Hyperaldo.                    -PRN hydral and labetalol but would try to keep SBP >120 given his baseline BP's.          -ECHO suggested euvolemic on 4/19, EF unchanged at 30-35%.      CKD with L Subcapsular bleeding-Baseline Cr 1.6-1.8 since at least 11/2021, surprisingly at baseline despite acute issues with L kidney.  Went to IR for embolization this am, getting repeat imaging to evaluate for ongoing bleeding, surgery following for possible need for  "nephrectomy if he continues to bleed.  Would anticipate Cr may bump with multiple contrast loads for interventions.                  -Cr at baseline, likely to have YOGI in coming days.                  -Will need follow up in CKD clinic, has never seen nephrology.       Volume-No edema on exam, polyuric. ECHO showed normal IVC on 4/19.     HFrEF-EF 30-35% in Jan, rechecking today.  On Coreg and Lisinopril PTA.       Electrolytes-K 4.0, bicarb 27, Na 134.       BMD-Ca 8.7, Mg 1.7, Phos 3.5 last check.       Anemia-Hgb 11.4 at baseline.       Nutrition-Taking PO PTA, NPO for procedures for now.       Time spent: 30 minutes on this date of encounter for chart review, physical exam, medical decision making and co-ordination of care.      Discussed with Dr He     Recommendations were communicated to primary team via verbal communication.        ASHWINI Hoskins CNS  Clinical Nurse Specialist  [phone]    Review of Systems:   I reviewed the following systems:  Gen: No fevers or chills  CV: No CP at rest  Resp: No SOB at rest  GI: No N/V    Physical Exam:   I/O last 3 completed shifts:  In: 1660 [P.O.:1660]  Out: 3850 [Urine:3850]   BP (!) 167/109 (BP Location: Left arm)   Pulse 95   Temp 98.4  F (36.9  C) (Oral)   Resp 22   Ht 1.88 m (6' 2\")   Wt 134.1 kg (295 lb 10.2 oz)   SpO2 99%   BMI 37.96 kg/m       GENERAL APPEARANCE: Obese male, alert and no distress  EYES: No scleral icterus  NECK:  No JVD  Pulmonary: lungs clear to auscultation with equal breath sounds bilaterally, no clubbing or cyanosis  CV: Regular rhythm, normal rate, no rub   - Edema none  GI: soft, nontender, normal bowel sounds  MS: no evidence of inflammation in joints, no muscle tenderness  : No Daugherty  SKIN: no rash, warm, dry  NEURO: mentation intact and speech normal    Labs:   All labs reviewed by me  Electrolytes/Renal -   Recent Labs   Lab Test 04/21/23  0532 04/20/23  1939 04/20/23  1601 04/20/23  0805 04/20/23  0534 " 04/19/23  1627 04/19/23  1444 04/19/23  1252 04/19/23  1057 04/19/23  0853 04/19/23  0436   *  --   --   --  135*  --   --   --   --   --  137   POTASSIUM 4.0  --   --   --  3.9  --   --   --  4.2  --  3.3*   CHLORIDE 95*  --   --   --  96*  --   --   --   --   --  99   CO2 27  --   --   --  27  --   --   --   --   --  25   BUN 17.8  --   --   --  14.7  --   --   --   --   --  12.5   CR 1.63*  --   --   --  1.65*  --   --   --   --   --  1.60*   GLC 97 150* 145*   < > 94   < >  --    < >  --    < > 116*   EFREM 8.7  --   --   --  8.9  --   --   --   --   --  8.7   MAG 1.7  --   --   --   --   --  2.1  --   --   --  1.4*   PHOS 3.5  --   --   --   --   --   --   --   --   --  3.2    < > = values in this interval not displayed.       CBC -   Recent Labs   Lab Test 04/21/23  0532 04/20/23  0534 04/19/23  2135   WBC 16.5* 16.4* 13.9*   HGB 11.4* 11.1* 11.7*    229 217       LFTs -   Recent Labs   Lab Test 04/20/23  0534 04/18/23  1736 01/21/22  2045   ALKPHOS 92 120 119   BILITOTAL 1.1 0.9 0.9   ALT 25 21 60   AST 43 22 35   PROTTOTAL 6.8 7.3 6.8   ALBUMIN 3.7 4.2 2.6*       Iron Panel -   Recent Labs   Lab Test 04/19/23  0502 04/19/23  0436   IRON 57*  --    IRONSAT 17  --    HUSSEIN  --  57           Current Medications:    acetaminophen  650 mg Oral Q4H     lisinopril  40 mg Oral Daily     melatonin  3 mg Oral At Bedtime     metoprolol succinate ER  25 mg Oral Daily     polyethylene glycol  17 g Oral Daily     sennosides  8.6 mg Oral BID     sodium chloride (PF)  3 mL Intracatheter Q8H     spironolactone  12.5 mg Oral Daily     torsemide  20 mg Oral BID       Sara JACOBS, saw and evaluated this patient as part of a shared visit.  I have reviewed and discussed with the advanced practice provider their history, physical and plan.  I have personally performed the substantive portion of the medical decision making for this visit - please see the JOSE ARMANDO's documentation for the full details  I personally  reviewed the vital signs, medications, labs and imaging.    Key findings and management decisions made by me:  Hypertensive emergency, pseudoaneurysm s/p intervention, creatinine at 1.6 and stable. Started spironolactone and can titrate up with prn BP meds in the mean time. Patient very upset about having to stay in hospital and almost left AMA.      Sara He  Date of Service (when I saw the patient): 4/21

## 2023-04-21 NOTE — PLAN OF CARE
"BP (!) 167/109 (BP Location: Left arm)   Pulse 95   Temp 98.4  F (36.9  C) (Oral)   Resp 22   Ht 1.88 m (6' 2\")   Wt 134.1 kg (295 lb 10.2 oz)   SpO2 99%   BMI 37.96 kg/m      SR-ST on tele with rates up to the 110's with activity. SBP goal < 180 and DBP goal < 110. Has not needed PRN labetalol. Afebrile. Room air while awake and 2L NC while sleeping due to suspected sleep apnea. Alert and oriented x 4. PRN oxy 10mg given twice along with robaxin for right groin and left abdominal pain with relief. NPO for possible procedure today. Denies n/v. Right PIV x 2 saline locked. Voiding per urinal. No BM overnight. OOB with SBA. Repositioning self in bed. Continue to monitor.  "

## 2023-04-22 LAB
ANION GAP SERPL CALCULATED.3IONS-SCNC: 13 MMOL/L (ref 7–15)
BASOPHILS # BLD AUTO: 0.1 10E3/UL (ref 0–0.2)
BASOPHILS NFR BLD AUTO: 0 %
BUN SERPL-MCNC: 19.5 MG/DL (ref 6–20)
CALCIUM SERPL-MCNC: 9.3 MG/DL (ref 8.6–10)
CHLORIDE SERPL-SCNC: 95 MMOL/L (ref 98–107)
CREAT SERPL-MCNC: 1.57 MG/DL (ref 0.67–1.17)
DEPRECATED HCO3 PLAS-SCNC: 27 MMOL/L (ref 22–29)
EOSINOPHIL # BLD AUTO: 0 10E3/UL (ref 0–0.7)
EOSINOPHIL NFR BLD AUTO: 0 %
ERYTHROCYTE [DISTWIDTH] IN BLOOD BY AUTOMATED COUNT: 15.9 % (ref 10–15)
GFR SERPL CREATININE-BSD FRML MDRD: 60 ML/MIN/1.73M2
GLUCOSE BLDC GLUCOMTR-MCNC: 124 MG/DL (ref 70–99)
GLUCOSE BLDC GLUCOMTR-MCNC: 98 MG/DL (ref 70–99)
GLUCOSE SERPL-MCNC: 126 MG/DL (ref 70–99)
HCT VFR BLD AUTO: 38.4 % (ref 40–53)
HGB BLD-MCNC: 11.5 G/DL (ref 13.3–17.7)
IMM GRANULOCYTES # BLD: 0.1 10E3/UL
IMM GRANULOCYTES NFR BLD: 1 %
LACTATE SERPL-SCNC: 1.2 MMOL/L (ref 0.7–2)
LYMPHOCYTES # BLD AUTO: 0.9 10E3/UL (ref 0.8–5.3)
LYMPHOCYTES NFR BLD AUTO: 7 %
MAGNESIUM SERPL-MCNC: 1.7 MG/DL (ref 1.7–2.3)
MCH RBC QN AUTO: 24.7 PG (ref 26.5–33)
MCHC RBC AUTO-ENTMCNC: 29.9 G/DL (ref 31.5–36.5)
MCV RBC AUTO: 83 FL (ref 78–100)
MONOCYTES # BLD AUTO: 1.5 10E3/UL (ref 0–1.3)
MONOCYTES NFR BLD AUTO: 12 %
NEUTROPHILS # BLD AUTO: 10.1 10E3/UL (ref 1.6–8.3)
NEUTROPHILS NFR BLD AUTO: 80 %
NRBC # BLD AUTO: 0 10E3/UL
NRBC BLD AUTO-RTO: 0 /100
PHOSPHATE SERPL-MCNC: 4 MG/DL (ref 2.5–4.5)
PLATELET # BLD AUTO: 264 10E3/UL (ref 150–450)
POTASSIUM SERPL-SCNC: 4.2 MMOL/L (ref 3.4–5.3)
RBC # BLD AUTO: 4.65 10E6/UL (ref 4.4–5.9)
SODIUM SERPL-SCNC: 135 MMOL/L (ref 136–145)
WBC # BLD AUTO: 12.6 10E3/UL (ref 4–11)

## 2023-04-22 PROCEDURE — 250N000011 HC RX IP 250 OP 636

## 2023-04-22 PROCEDURE — 36415 COLL VENOUS BLD VENIPUNCTURE: CPT | Performed by: INTERNAL MEDICINE

## 2023-04-22 PROCEDURE — 99233 SBSQ HOSP IP/OBS HIGH 50: CPT | Performed by: INTERNAL MEDICINE

## 2023-04-22 PROCEDURE — 999N000128 HC STATISTIC PERIPHERAL IV START W/O US GUIDANCE

## 2023-04-22 PROCEDURE — 85025 COMPLETE CBC W/AUTO DIFF WBC: CPT

## 2023-04-22 PROCEDURE — 999N000248 HC STATISTIC IV INSERT WITH US BY RN

## 2023-04-22 PROCEDURE — 250N000013 HC RX MED GY IP 250 OP 250 PS 637: Performed by: STUDENT IN AN ORGANIZED HEALTH CARE EDUCATION/TRAINING PROGRAM

## 2023-04-22 PROCEDURE — 84100 ASSAY OF PHOSPHORUS: CPT

## 2023-04-22 PROCEDURE — 83735 ASSAY OF MAGNESIUM: CPT

## 2023-04-22 PROCEDURE — 120N000003 HC R&B IMCU UMMC

## 2023-04-22 PROCEDURE — 99232 SBSQ HOSP IP/OBS MODERATE 35: CPT | Mod: GC | Performed by: INTERNAL MEDICINE

## 2023-04-22 PROCEDURE — 250N000013 HC RX MED GY IP 250 OP 250 PS 637

## 2023-04-22 PROCEDURE — 250N000011 HC RX IP 250 OP 636: Performed by: STUDENT IN AN ORGANIZED HEALTH CARE EDUCATION/TRAINING PROGRAM

## 2023-04-22 PROCEDURE — 80048 BASIC METABOLIC PNL TOTAL CA: CPT

## 2023-04-22 PROCEDURE — 36415 COLL VENOUS BLD VENIPUNCTURE: CPT

## 2023-04-22 PROCEDURE — 83605 ASSAY OF LACTIC ACID: CPT | Performed by: INTERNAL MEDICINE

## 2023-04-22 RX ORDER — CARVEDILOL 12.5 MG/1
12.5 TABLET ORAL 2 TIMES DAILY
Status: DISCONTINUED | OUTPATIENT
Start: 2023-04-22 | End: 2023-04-23

## 2023-04-22 RX ORDER — LABETALOL HYDROCHLORIDE 5 MG/ML
20 INJECTION, SOLUTION INTRAVENOUS EVERY 4 HOURS PRN
Status: DISCONTINUED | OUTPATIENT
Start: 2023-04-22 | End: 2023-04-25 | Stop reason: HOSPADM

## 2023-04-22 RX ORDER — AMLODIPINE BESYLATE 5 MG/1
5 TABLET ORAL DAILY
Status: DISCONTINUED | OUTPATIENT
Start: 2023-04-22 | End: 2023-04-23

## 2023-04-22 RX ADMIN — ACETAMINOPHEN 650 MG: 325 TABLET, FILM COATED ORAL at 20:17

## 2023-04-22 RX ADMIN — ACETAMINOPHEN 650 MG: 325 TABLET, FILM COATED ORAL at 16:19

## 2023-04-22 RX ADMIN — SPIRONOLACTONE 12.5 MG: 25 TABLET, FILM COATED ORAL at 08:06

## 2023-04-22 RX ADMIN — AMLODIPINE BESYLATE 5 MG: 5 TABLET ORAL at 11:46

## 2023-04-22 RX ADMIN — Medication 3 MG: at 22:58

## 2023-04-22 RX ADMIN — TORSEMIDE 20 MG: 20 TABLET ORAL at 16:19

## 2023-04-22 RX ADMIN — LABETALOL HYDROCHLORIDE 20 MG: 5 INJECTION, SOLUTION INTRAVENOUS at 14:44

## 2023-04-22 RX ADMIN — ACETAMINOPHEN 650 MG: 325 TABLET, FILM COATED ORAL at 04:05

## 2023-04-22 RX ADMIN — ACETAMINOPHEN 650 MG: 325 TABLET, FILM COATED ORAL at 23:46

## 2023-04-22 RX ADMIN — POLYETHYLENE GLYCOL 3350 17 G: 17 POWDER, FOR SOLUTION ORAL at 11:46

## 2023-04-22 RX ADMIN — SENNOSIDES 8.6 MG: 8.6 TABLET, FILM COATED ORAL at 08:07

## 2023-04-22 RX ADMIN — OXYCODONE HYDROCHLORIDE 10 MG: 5 TABLET ORAL at 12:50

## 2023-04-22 RX ADMIN — ACETAMINOPHEN 650 MG: 325 TABLET, FILM COATED ORAL at 11:46

## 2023-04-22 RX ADMIN — ACETAMINOPHEN 650 MG: 325 TABLET, FILM COATED ORAL at 08:05

## 2023-04-22 RX ADMIN — OXYCODONE HYDROCHLORIDE 10 MG: 5 TABLET ORAL at 04:05

## 2023-04-22 RX ADMIN — LABETALOL HYDROCHLORIDE 20 MG: 5 INJECTION, SOLUTION INTRAVENOUS at 05:24

## 2023-04-22 RX ADMIN — METHOCARBAMOL 1000 MG: 500 TABLET ORAL at 17:43

## 2023-04-22 RX ADMIN — LISINOPRIL 40 MG: 20 TABLET ORAL at 08:06

## 2023-04-22 RX ADMIN — SENNOSIDES 8.6 MG: 8.6 TABLET, FILM COATED ORAL at 20:17

## 2023-04-22 RX ADMIN — CARVEDILOL 12.5 MG: 12.5 TABLET, FILM COATED ORAL at 11:46

## 2023-04-22 RX ADMIN — CARVEDILOL 12.5 MG: 12.5 TABLET, FILM COATED ORAL at 20:17

## 2023-04-22 RX ADMIN — METOPROLOL SUCCINATE 25 MG: 25 TABLET, EXTENDED RELEASE ORAL at 08:06

## 2023-04-22 RX ADMIN — TORSEMIDE 20 MG: 20 TABLET ORAL at 08:07

## 2023-04-22 RX ADMIN — OXYCODONE HYDROCHLORIDE 10 MG: 5 TABLET ORAL at 23:03

## 2023-04-22 RX ADMIN — OXYCODONE HYDROCHLORIDE 10 MG: 5 TABLET ORAL at 17:43

## 2023-04-22 RX ADMIN — LABETALOL HYDROCHLORIDE 20 MG: 5 INJECTION, SOLUTION INTRAVENOUS at 17:43

## 2023-04-22 RX ADMIN — OXYCODONE HYDROCHLORIDE 10 MG: 5 TABLET ORAL at 08:05

## 2023-04-22 ASSESSMENT — ACTIVITIES OF DAILY LIVING (ADL)
ADLS_ACUITY_SCORE: 18

## 2023-04-22 NOTE — PROGRESS NOTES
[facility]    Medicine Progress Note - Medicine Service, MAROON TEAM 1    Date of Admission:  4/18/2023    Assessment & Plan   Miguel Ángel Wilson is a 32 year old male admitted on 4/18/2023 who is being transferred out of the ICU on 4/20/2023. He has a history of hypertension, atrial fibrillation, CKD, HFrEF who was admitted with left flank pain found to have subcapsular hematoma s/p IR embolization initially admitted to ICU for close hemodynamic monitoring and hypertension requiring nicardipine drip. Transferred out of ICU on 4/21. Plan for left renal angiogram (w/ anesthesia) w/ possible embolization vs direct stick on 4/24.    Changes Today:  - discontinue metoprolol  - start carvedilol 12.5 mg BID  - start amlodipine 5 mg  - PRN labetalol now q6HPRN (previously q8h PRN)  - Continued inpatient monitoring (risk of pseudoaneurysm rupture) until IR intervention on 4/24      Acute Issues:  Left renal subcapsular hematoma s/p IR embolization (4/18)  Left renal pseudoaneurysm (17mm)  Patient presented w/ L flank pain found to have subcapsular hematoma on CT. No trauma or inciting event for the hematoma, though patient is on anticoagulation. Underwent IR embolization on 4/18 (unable to identify discrete vessel of extravasation, superior pole segmental vessel embolized, persistent pseudoaneurysm). Repeat CTA on 4/19 demonstrated postprocedural changes of superior L renal artery embolization w/ persistent faint filling of pseudoaneurysm and resolution of area of contrast extravasation.   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 EKG without concern for ACS. Patient ran out of lisinopril PTA. Prior hyperaldo workup (11/2021, although not AM sample): PAC 11.5, PRA 0.4, K 3.4.  - Nephrology following; recs appreciated   - Goal SBP > 120 (given longstanding HTN)  - s/p nicardipine gtt (4/18-4/19)  - Continue PTA lisinopril 40mg daily; pta metoprolol succinate 25mg BID discontinued given ongoing BP elevation; will trial carvedilol 12.5 mg BID and also add amlodipine 5mg  - spironolactone 12.5mg daily  - IV labetalol PRN for SBP > 180 (q6h PRN)  - Sleep study as outpatient    Chronic/Stable/Resolved Issues:  HFrEF (EF 30-35%); not in acute exacerbation  Patient initially diagnosed with HFrEF in November of 2021 after presenting to [facility] with dyspnea and lower extremity edema. TTE showed LVEF of 45% and LVH, discharged on lisinopril and carvedilol at that time. Pt. Presented to [facility] cardiology in January of 2022 with ongoing dyspnea and worsening LE edema. pBNP was 6,200 and troponin 141 --> 104 at that time. He underwent aggressive diuresis with bumex gtt with improvement of edema. Patient's carvedilol was replaced with metoprolol at that time and he was started on torsemide and apixaban in addition to his prior lisinopril. Now on this admission, patient's BNP found to be 2,256 and troponin of 26. EKG showed sinus rhythm with LV hypertrophy and QT prolongation. EF was 30-35% on 01/21/22. Patient was given a dose of 60mg IV lasix on admission. CXR without significant edema and patient without edema on exam.   - Cardiology signed off; recs appreciated   - Needs outpatient follow up for further cardiomyopathy workup (eg, cMRI)  - GDMT: continue PTA lisinopril, metoprolol succinate; started spironolactone  - Continue PTA torsemide 20mg BID     Atrial fibrillation  CHADSVASC 2.  -  "Continue PTA metoprolol succinate  - Holding PTA apixaban      Demand ischemia in setting of hypertensive emergency; downtrended  Patient with reproducible chest pain on admission.      CKD  Unclear etiology of CKD, possibly in the setting of uncontrolled hypertension. Baseline creatinine appears to be 1.6-1.7 though patient has not had close follow up.  - Nephrology following; recs appreciated   - Anticipate in YOGI following contrast load   - Needs outpatient follow up       Diet: Regular Diet Adult  NPO per Anesthesia Guidelines for Procedure/Surgery Except for: Meds    DVT Prophylaxis: Pneumatic Compression Devices  Daugherty Catheter: Not present  Lines: None     Cardiac Monitoring: None  Code Status: Full Code      Clinically Significant Risk Factors                       # DMII: A1C = 6.7 % (Ref range: <5.7 %) within past 6 months, PRESENT ON ADMISSION  # Obesity: Estimated body mass index is 37.96 kg/m  as calculated from the following:    Height as of this encounter: 1.88 m (6' 2\").    Weight as of this encounter: 134.1 kg (295 lb 10.2 oz)., PRESENT ON ADMISSION         Disposition Plan     Expected Discharge Date: 04/25/2023        Discharge Comments: Repeat IR angio with anesthesia on 4/24.        The patient's care was discussed with the Attending Physician, Dr. Clay.    Shane Higgins MD PhD  Medicine Service, [address]  Securely message with Vocera (more info)  Text page via [facility] Paging/Directory   See signed in provider for up to date coverage information  ______________________________________________________________________    Interval History   No acute events overnight.     Had poor sleep and would prefer to nap at time of interview.  Some concerns about leaving prior to 04/24 but patient was reassured and agreed to stay.    4-pt ROS otherwise negative    Physical Exam   Vital Signs: Temp: 98.8  F (37.1  C) Temp src: Oral BP: (!) " 172/115 Pulse: 92   Resp: 18 SpO2: 96 % O2 Device: Nasal cannula Oxygen Delivery: 2 LPM  Weight: 295 lbs 10.19 oz    Constitutional: NAD. Conversant. Generally well-appearing.   HEENT: NC/AT, anicteric sclera, pupils round and equal, EOMI.  CV: RRR, normal S1/S2, no murmurs/gallops/rubs.  Pulm: Non-labored respirations on room air, CTAB, no wheezes or crackles.  Abdomen: Tenderness to deep palpation along left lateral portion of abdomen. Normoactive bowel sounds, soft, non-distended, non-tender. No CVA tenderness.  Extremities: Warm and well-perfused, no peripheral edema, cyanosis, or clubbing.  Skin: No jaundice, no rashes on exposed skin.  Neuro: Alert and oriented x3, moves all four extremities independently.  Psych: Normal affect.    Medical Decision Making       Data     I have personally reviewed the following data over the past 24 hrs:    12.6 (H)  \   11.5 (L)   / 264     135 (L) 95 (L) 19.5 /  98   4.2 27 1.57 (H) \       Procal: N/A CRP: N/A Lactic Acid: 1.4

## 2023-04-22 NOTE — PLAN OF CARE
"BP (!) 154/104 (BP Location: Left arm, Cuff Size: Adult Large)   Pulse 93   Temp 99.3  F (37.4  C) (Oral)   Resp 19   Ht 1.88 m (6' 2\")   Wt 134.1 kg (295 lb 10.2 oz)   SpO2 99%   BMI 37.96 kg/m      Shift: 1900 - 0730  VS: Stable on 2 L nasal cannula, afebrile  Cardiac: Telemetry monitoring in progress, rhythm has been sinus tachycardia throughout shift. HR: 80s - 100s, blood pressures have been hypertensive. PRN labetalol given x2  Neuro: AOx4  Labs: AM labs have been collected, results are pending at this time.  Respiratory: Pt denies dyspnea, no cough noted. Lung sounds are clear throughout  Pain/Nausea/PRN: Pt denies nausea, PRN oxycodone given x3 for flank pain.  Diet: Regular  LDA: L PIV (SL)  GI/: Voiding adequate amounts into bedside urinal, LBM: 4/18  Skin: No new findings this shift  Mobility: SBA  Plan: Pt is on IR schedule for left renal angiogram with possible re-embolization vs direct stick on 4/24.    Will give report to oncoming nurse. Pt left in stable condition, care relinquished at this time.       "

## 2023-04-22 NOTE — PROVIDER NOTIFICATION
"Time of notification: 8:09 PM  Provider notified: Geovani Kamara MD  Patient status:  \"[id number]     FYI: Pt's BP is 173/125. PRN labetalol is not available until 2130. Thanks    Perkin #[id number]\"  Orders received: Provider asked author to recheck pt's blood pressure when PRN labetalol was available.    "

## 2023-04-22 NOTE — PROGRESS NOTES
Nephrology Progress Note  04/22/2023       Miguel Ángel Wilson is a 32 yom with longstanding uncontrolled HTN, a-fib, CKD, CHF admitted for L flank pain with imaging revealing subcapular hematoma and pseudoaneurysm.  Went for IR embolization early am 4/19.  Baseline Cr 1.6-1.8 since at least 2021, was 1.8 on admit and has been stable within his baseline.  Pt having severe HTN, Nephrology consulted for management of HTN in setting of page kidney.      Interval History :   Mr Wilson's Cr is stable at his baseline of 1.6 despite acute issues, is planned for repeat imaging of L kidney +/- intervention in IR on 4/24 depending on findings.  SBP's in 160-170s with some prn given, will plan to increase spironolactone and agree with carvedilol    Assessment & Recommendations:   Hypertension-As outpt is on Lisinopril 40mg daily, Metoprolol XL 25mg daily and torsemide for heart failure.  Ran out of meds (see pharmacy note but more meds would have ran out in ~Jan 2023 based on refill dates) so unclear if his current HTN on admit is his baseline or worse due to subcapsular bleeding/page kidney.  Restarted on Lisinopril and Metoprolol with PRN hydral and labetalol and BP's are at goal of -150 for now.  Was on Nicardipine for ~12h but now weaned off.                 -Agree with resumption of home meds                               -Lisinopril 40mg                               -Metoprolol switched to carvedilol                              - amlodipine 5mg                               -Added spironolactone for CHF and ? Hyperaldo- would increase to 25mg and can continue to titrate up                   -PRN hydral and labetalol but would try to keep SBP >120 given his baseline BP's.          -ECHO suggested euvolemic on 4/19, EF unchanged at 30-35%.      CKD with L Subcapsular bleeding-Baseline Cr 1.6-1.8 since at least 11/2021, surprisingly at baseline despite acute issues with L kidney.  Went to IR for embolization this am,  "getting repeat imaging to evaluate for ongoing bleeding, surgery following for possible need for nephrectomy if he continues to bleed.  Would anticipate Cr may bump with multiple contrast loads for interventions.                  -Cr at baseline, likely to have YOGI in coming days.                  -Will need follow up in CKD clinic, has never seen nephrology- message sent to schedulers       Volume-No edema on exam, polyuric. ECHO showed normal IVC on 4/19.     HFrEF-EF 30-35% in Jan, rechecking today.  On Coreg and Lisinopril PTA.       Electrolytes-K and bicarb stable     BMD-Ca, Mag and Phos stable     Anemia-Hgb mildly low at baseline.       Nutrition-Taking PO      Recommendations were communicated to primary team via note       Review of Systems:   I reviewed the following systems:  Gen: No fevers or chills  CV: No CP at rest  Resp: No SOB at rest  GI: No N/V    Physical Exam:   I/O last 3 completed shifts:  In: 1200 [P.O.:1200]  Out: 3950 [Urine:3950]   BP (!) 154/113 (BP Location: Left arm)   Pulse 101   Temp 98.3  F (36.8  C) (Oral)   Resp 18   Ht 1.88 m (6' 2\")   Wt 134.1 kg (295 lb 10.2 oz)   SpO2 95%   BMI 37.96 kg/m       GENERAL APPEARANCE: Obese male, alert and no distress  EYES: No scleral icterus  NECK:  No JVD  Pulmonary: lungs clear to auscultation with equal breath sounds bilaterally, no clubbing or cyanosis  CV: Regular rhythm, normal rate, no rub   - Edema none  GI: soft, nontender, normal bowel sounds  MS: no evidence of inflammation in joints, no muscle tenderness  : No Daugherty  SKIN: no rash, warm, dry  NEURO: mentation intact and speech normal    Labs:   All labs reviewed by me  Electrolytes/Renal -   Recent Labs   Lab Test 04/22/23  1215 04/22/23  0744 04/22/23  0559 04/21/23  1145 04/21/23  0532 04/20/23  0805 04/20/23  0534 04/19/23  1627 04/19/23  1444 04/19/23  0853 04/19/23  0436   NA  --   --  135*  --  134*  --  135*  --   --   --  137   POTASSIUM  --   --  4.2  --  4.0  --  " 3.9  --   --    < > 3.3*   CHLORIDE  --   --  95*  --  95*  --  96*  --   --   --  99   CO2  --   --  27  --  27  --  27  --   --   --  25   BUN  --   --  19.5  --  17.8  --  14.7  --   --   --  12.5   CR  --   --  1.57*  --  1.63*  --  1.65*  --   --   --  1.60*   * 98 126*   < > 97   < > 94   < >  --    < > 116*   EFREM  --   --  9.3  --  8.7  --  8.9  --   --   --  8.7   MAG  --   --  1.7  --  1.7  --   --   --  2.1  --  1.4*   PHOS  --   --  4.0  --  3.5  --   --   --   --   --  3.2    < > = values in this interval not displayed.       CBC -   Recent Labs   Lab Test 04/22/23  0559 04/21/23  0532 04/20/23  0534   WBC 12.6* 16.5* 16.4*   HGB 11.5* 11.4* 11.1*    240 229       LFTs -   Recent Labs   Lab Test 04/20/23  0534 04/18/23  1736 01/21/22  2045   ALKPHOS 92 120 119   BILITOTAL 1.1 0.9 0.9   ALT 25 21 60   AST 43 22 35   PROTTOTAL 6.8 7.3 6.8   ALBUMIN 3.7 4.2 2.6*       Iron Panel -   Recent Labs   Lab Test 04/19/23  0502 04/19/23  0436   IRON 57*  --    IRONSAT 17  --    HUSSEIN  --  57           Current Medications:    acetaminophen  650 mg Oral Q4H     amLODIPine  5 mg Oral Daily     carvedilol  12.5 mg Oral BID     lisinopril  40 mg Oral Daily     melatonin  3 mg Oral At Bedtime     polyethylene glycol  17 g Oral Daily     sennosides  8.6 mg Oral BID     sodium chloride (PF)  3 mL Intracatheter Q8H     spironolactone  12.5 mg Oral Daily     torsemide  20 mg Oral BID       Sara He MD   of Medicine  Department of Nephrology  [facility]
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
## 2023-04-23 LAB
ANION GAP SERPL CALCULATED.3IONS-SCNC: 14 MMOL/L (ref 7–15)
BASOPHILS # BLD AUTO: 0.1 10E3/UL (ref 0–0.2)
BASOPHILS NFR BLD AUTO: 1 %
BUN SERPL-MCNC: 21.5 MG/DL (ref 6–20)
CALCIUM SERPL-MCNC: 9.5 MG/DL (ref 8.6–10)
CHLORIDE SERPL-SCNC: 94 MMOL/L (ref 98–107)
CREAT SERPL-MCNC: 1.48 MG/DL (ref 0.67–1.17)
DEPRECATED HCO3 PLAS-SCNC: 28 MMOL/L (ref 22–29)
EOSINOPHIL # BLD AUTO: 0.3 10E3/UL (ref 0–0.7)
EOSINOPHIL NFR BLD AUTO: 2 %
ERYTHROCYTE [DISTWIDTH] IN BLOOD BY AUTOMATED COUNT: 15.8 % (ref 10–15)
GFR SERPL CREATININE-BSD FRML MDRD: 64 ML/MIN/1.73M2
GLUCOSE SERPL-MCNC: 101 MG/DL (ref 70–99)
HCT VFR BLD AUTO: 38.1 % (ref 40–53)
HGB BLD-MCNC: 11.3 G/DL (ref 13.3–17.7)
IMM GRANULOCYTES # BLD: 0 10E3/UL
IMM GRANULOCYTES NFR BLD: 0 %
LYMPHOCYTES # BLD AUTO: 1.3 10E3/UL (ref 0.8–5.3)
LYMPHOCYTES NFR BLD AUTO: 12 %
MAGNESIUM SERPL-MCNC: 1.7 MG/DL (ref 1.7–2.3)
MCH RBC QN AUTO: 24.4 PG (ref 26.5–33)
MCHC RBC AUTO-ENTMCNC: 29.7 G/DL (ref 31.5–36.5)
MCV RBC AUTO: 82 FL (ref 78–100)
MONOCYTES # BLD AUTO: 1.3 10E3/UL (ref 0–1.3)
MONOCYTES NFR BLD AUTO: 12 %
NEUTROPHILS # BLD AUTO: 7.8 10E3/UL (ref 1.6–8.3)
NEUTROPHILS NFR BLD AUTO: 73 %
NRBC # BLD AUTO: 0 10E3/UL
NRBC BLD AUTO-RTO: 0 /100
PHOSPHATE SERPL-MCNC: 3.3 MG/DL (ref 2.5–4.5)
PLATELET # BLD AUTO: 284 10E3/UL (ref 150–450)
POTASSIUM SERPL-SCNC: 3.9 MMOL/L (ref 3.4–5.3)
RBC # BLD AUTO: 4.63 10E6/UL (ref 4.4–5.9)
SODIUM SERPL-SCNC: 136 MMOL/L (ref 136–145)
WBC # BLD AUTO: 10.7 10E3/UL (ref 4–11)

## 2023-04-23 PROCEDURE — 84100 ASSAY OF PHOSPHORUS: CPT

## 2023-04-23 PROCEDURE — 99232 SBSQ HOSP IP/OBS MODERATE 35: CPT | Mod: GC | Performed by: INTERNAL MEDICINE

## 2023-04-23 PROCEDURE — 36415 COLL VENOUS BLD VENIPUNCTURE: CPT

## 2023-04-23 PROCEDURE — 250N000011 HC RX IP 250 OP 636: Performed by: STUDENT IN AN ORGANIZED HEALTH CARE EDUCATION/TRAINING PROGRAM

## 2023-04-23 PROCEDURE — 85025 COMPLETE CBC W/AUTO DIFF WBC: CPT

## 2023-04-23 PROCEDURE — 83735 ASSAY OF MAGNESIUM: CPT

## 2023-04-23 PROCEDURE — 250N000011 HC RX IP 250 OP 636

## 2023-04-23 PROCEDURE — 999N000128 HC STATISTIC PERIPHERAL IV START W/O US GUIDANCE

## 2023-04-23 PROCEDURE — 250N000013 HC RX MED GY IP 250 OP 250 PS 637: Performed by: STUDENT IN AN ORGANIZED HEALTH CARE EDUCATION/TRAINING PROGRAM

## 2023-04-23 PROCEDURE — 80048 BASIC METABOLIC PNL TOTAL CA: CPT

## 2023-04-23 PROCEDURE — 250N000013 HC RX MED GY IP 250 OP 250 PS 637

## 2023-04-23 PROCEDURE — 120N000003 HC R&B IMCU UMMC

## 2023-04-23 PROCEDURE — 99233 SBSQ HOSP IP/OBS HIGH 50: CPT | Performed by: INTERNAL MEDICINE

## 2023-04-23 RX ORDER — POLYETHYLENE GLYCOL 3350 17 G/17G
17 POWDER, FOR SOLUTION ORAL DAILY
Qty: 510 G | Refills: 3 | Status: SHIPPED | OUTPATIENT
Start: 2023-04-23 | End: 2024-07-29

## 2023-04-23 RX ORDER — POLYETHYLENE GLYCOL 3350 17 G/17G
17 POWDER, FOR SOLUTION ORAL 2 TIMES DAILY
Status: DISCONTINUED | OUTPATIENT
Start: 2023-04-23 | End: 2023-04-25 | Stop reason: HOSPADM

## 2023-04-23 RX ORDER — OXYCODONE HYDROCHLORIDE 5 MG/1
5 TABLET ORAL EVERY 4 HOURS PRN
Status: DISCONTINUED | OUTPATIENT
Start: 2023-04-23 | End: 2023-04-25 | Stop reason: HOSPADM

## 2023-04-23 RX ORDER — AMLODIPINE BESYLATE 5 MG/1
5 TABLET ORAL ONCE
Status: COMPLETED | OUTPATIENT
Start: 2023-04-23 | End: 2023-04-23

## 2023-04-23 RX ORDER — AMLODIPINE BESYLATE 10 MG/1
10 TABLET ORAL DAILY
Status: DISCONTINUED | OUTPATIENT
Start: 2023-04-24 | End: 2023-04-24

## 2023-04-23 RX ORDER — SPIRONOLACTONE 25 MG/1
25 TABLET ORAL DAILY
Status: DISCONTINUED | OUTPATIENT
Start: 2023-04-23 | End: 2023-04-24

## 2023-04-23 RX ORDER — AMLODIPINE BESYLATE 10 MG/1
10 TABLET ORAL DAILY
Qty: 90 TABLET | Refills: 3 | Status: SHIPPED | OUTPATIENT
Start: 2023-04-24 | End: 2023-04-24

## 2023-04-23 RX ORDER — LISINOPRIL 40 MG/1
40 TABLET ORAL DAILY
Qty: 90 TABLET | Refills: 3 | Status: SHIPPED | OUTPATIENT
Start: 2023-04-23 | End: 2023-06-01

## 2023-04-23 RX ORDER — TORSEMIDE 20 MG/1
20 TABLET ORAL
Qty: 60 TABLET | Refills: 3 | Status: SHIPPED | OUTPATIENT
Start: 2023-04-23 | End: 2024-07-29

## 2023-04-23 RX ORDER — SPIRONOLACTONE 25 MG/1
25 TABLET ORAL DAILY
Qty: 90 TABLET | Refills: 3 | Status: SHIPPED | OUTPATIENT
Start: 2023-04-24 | End: 2023-04-24

## 2023-04-23 RX ORDER — MAGNESIUM SULFATE HEPTAHYDRATE 40 MG/ML
2 INJECTION, SOLUTION INTRAVENOUS ONCE
Status: COMPLETED | OUTPATIENT
Start: 2023-04-23 | End: 2023-04-23

## 2023-04-23 RX ORDER — CARVEDILOL 25 MG/1
25 TABLET ORAL 2 TIMES DAILY
Qty: 180 TABLET | Refills: 3 | Status: SHIPPED | OUTPATIENT
Start: 2023-04-23 | End: 2024-04-19

## 2023-04-23 RX ORDER — CARVEDILOL 25 MG/1
25 TABLET ORAL 2 TIMES DAILY
Status: DISCONTINUED | OUTPATIENT
Start: 2023-04-23 | End: 2023-04-25 | Stop reason: HOSPADM

## 2023-04-23 RX ORDER — SENNOSIDES 8.6 MG
2 TABLET ORAL 2 TIMES DAILY
Status: DISCONTINUED | OUTPATIENT
Start: 2023-04-23 | End: 2023-04-25 | Stop reason: HOSPADM

## 2023-04-23 RX ORDER — LACTULOSE 10 G/15ML
10 SOLUTION ORAL DAILY PRN
Status: DISCONTINUED | OUTPATIENT
Start: 2023-04-23 | End: 2023-04-25 | Stop reason: HOSPADM

## 2023-04-23 RX ORDER — CARVEDILOL 12.5 MG/1
12.5 TABLET ORAL ONCE
Status: COMPLETED | OUTPATIENT
Start: 2023-04-23 | End: 2023-04-23

## 2023-04-23 RX ADMIN — CARVEDILOL 12.5 MG: 12.5 TABLET, FILM COATED ORAL at 08:03

## 2023-04-23 RX ADMIN — OXYCODONE HYDROCHLORIDE 10 MG: 5 TABLET ORAL at 08:01

## 2023-04-23 RX ADMIN — ACETAMINOPHEN 650 MG: 325 TABLET, FILM COATED ORAL at 04:01

## 2023-04-23 RX ADMIN — LISINOPRIL 40 MG: 20 TABLET ORAL at 08:02

## 2023-04-23 RX ADMIN — SENNOSIDES 8.6 MG: 8.6 TABLET, FILM COATED ORAL at 08:03

## 2023-04-23 RX ADMIN — SENNOSIDES 2 TABLET: 8.6 TABLET, FILM COATED ORAL at 20:01

## 2023-04-23 RX ADMIN — POLYETHYLENE GLYCOL 3350 17 G: 17 POWDER, FOR SOLUTION ORAL at 08:05

## 2023-04-23 RX ADMIN — ACETAMINOPHEN 650 MG: 325 TABLET, FILM COATED ORAL at 12:16

## 2023-04-23 RX ADMIN — OXYCODONE HYDROCHLORIDE 5 MG: 5 TABLET ORAL at 19:01

## 2023-04-23 RX ADMIN — SPIRONOLACTONE 25 MG: 25 TABLET, FILM COATED ORAL at 08:02

## 2023-04-23 RX ADMIN — POLYETHYLENE GLYCOL 3350 17 G: 17 POWDER, FOR SOLUTION ORAL at 20:01

## 2023-04-23 RX ADMIN — ACETAMINOPHEN 650 MG: 325 TABLET, FILM COATED ORAL at 15:07

## 2023-04-23 RX ADMIN — LACTULOSE 10 G: 20 SOLUTION ORAL at 15:05

## 2023-04-23 RX ADMIN — LABETALOL HYDROCHLORIDE 20 MG: 5 INJECTION, SOLUTION INTRAVENOUS at 19:34

## 2023-04-23 RX ADMIN — MAGNESIUM SULFATE HEPTAHYDRATE 2 G: 40 INJECTION, SOLUTION INTRAVENOUS at 20:01

## 2023-04-23 RX ADMIN — AMLODIPINE BESYLATE 5 MG: 5 TABLET ORAL at 08:03

## 2023-04-23 RX ADMIN — TORSEMIDE 20 MG: 20 TABLET ORAL at 15:05

## 2023-04-23 RX ADMIN — ACETAMINOPHEN 650 MG: 325 TABLET, FILM COATED ORAL at 08:02

## 2023-04-23 RX ADMIN — ACETAMINOPHEN 650 MG: 325 TABLET, FILM COATED ORAL at 20:01

## 2023-04-23 RX ADMIN — TORSEMIDE 20 MG: 20 TABLET ORAL at 08:02

## 2023-04-23 RX ADMIN — Medication 3 MG: at 21:32

## 2023-04-23 RX ADMIN — CARVEDILOL 25 MG: 25 TABLET, FILM COATED ORAL at 20:01

## 2023-04-23 RX ADMIN — CARVEDILOL 12.5 MG: 12.5 TABLET, FILM COATED ORAL at 12:17

## 2023-04-23 RX ADMIN — AMLODIPINE BESYLATE 5 MG: 5 TABLET ORAL at 12:20

## 2023-04-23 ASSESSMENT — ACTIVITIES OF DAILY LIVING (ADL)
ADLS_ACUITY_SCORE: 18

## 2023-04-23 NOTE — PLAN OF CARE
"Neuro: A&Ox4.   Cardiac: Sinus tach.  BP <180  During shift diastolic elevated 120s-90s    control after administering BP medication.    Respiratory: Sating >95%  on RA.  GI/: Adequate urine output. BM X1  Diet/appetite: Tolerating Regular diet diet. Eating well.  Activity:  independent in room .  Pain: Reported pain 10 out of 10 this morning. Gave prn oxy was effective.   Skin: No new deficits noted.  LDA's:  None  @ 5:30 pm Patient removed  Right PIV. Request to get a new one patient denied IV placement.   -new LT PIV placed at 1900.     Events     @ 5:40 pm Patient left hospital against medical advice stated he has no one to take care of his dogs which he states \"it is stressing me out  and I need to get home.\"  Informed patient about the risk on leaving prior to his procedure, patient understands the risk of leaving AMA.       @6:20pm mom called unit and stated  patient wants to return to unit. Patient able to return. Upon arrival patient diaphoretic and /123. Patient stated \" I feel something strange in my chest like something about to happen.\" notify MD. Cardiac monitoring started Sinus tach   Reported pain 9 out of 10 gave oxy. Labetalol given for BP. Patient reported effective.     Procedure schedule tomorrow.     Plan: Continue with POC. Notify primary team with changes.    "

## 2023-04-23 NOTE — PLAN OF CARE
Goal Outcome Evaluation:  Neuro: A&Ox4.   Cardiac: SR. VSS, BP on the higher side.    Respiratory: Sating 98 % on RA.  GI/: Adequate urine output. No BM  Diet/appetite: Tolerating regular diet. Eating well.  Activity:  Standby assist up to bathroom.   Pain: At acceptable level on current regimen.   Skin: No new deficits noted.  LDA's: 1 PIV at right hand, right groin IR site, no hematoma noted.     Plan: For Renal angiogram on Monday. Continue with POC. Notify primary team with changes.

## 2023-04-23 NOTE — PROGRESS NOTES
Nephrology Progress Note  04/23/2023       Miguel Ángel Wilson is a 32 yom with longstanding uncontrolled HTN, a-fib, CKD, CHF admitted for L flank pain with imaging revealing subcapular hematoma and pseudoaneurysm.  Went for IR embolization early am 4/19.  Baseline Cr 1.6-1.8 since at least 2021, was 1.8 on admit and has been stable within his baseline.  Pt having severe HTN, Nephrology consulted for management of HTN in setting of page kidney.      Interval History :   Mr Wilson's Cr is stable at his baseline of 1.6 despite acute issues, is planned for repeat imaging of L kidney +/- intervention in IR on 4/24 depending on findings.  SBP's in 160-170s with some prn given, will plan to increase spironolactone and agree with carvedilol.  Patient states he has headaches on amlodipine and it has been stopped before due to this.    Assessment & Recommendations:   Hypertension-As outpt is on Lisinopril 40mg daily, Metoprolol XL 25mg daily and torsemide for heart failure.  Ran out of meds (see pharmacy note but more meds would have ran out in ~Jan 2023 based on refill dates) so unclear if his current HTN on admit is his baseline or worse due to subcapsular bleeding/page kidney.  Restarted on Lisinopril and Metoprolol with PRN hydral and labetalol and BP's are at goal of -150 for now.  Was on Nicardipine for ~12h but now weaned off.                 -Agree with resumption of home meds                               -Lisinopril 40mg                               -Metoprolol switched to carvedilol                              - amlodipine  - states it causes headaches, ? Nifedipine can be tried as alternative                              -Added spironolactone for CHF and ? Hyperaldo- would increase to 50mg and can continue to titrate up- can be on 100-200mg daily                   -PRN hydral and labetalol but would try to avoid SBP <120 given his baseline BP's.          -ECHO suggested euvolemic on 4/19, EF unchanged at  "30-35%.      CKD with L Subcapsular bleeding-Baseline Cr 1.6-1.8 since at least 11/2021, surprisingly at baseline despite acute issues with L kidney.  Went to IR for embolization this am, getting repeat imaging to evaluate for ongoing bleeding, surgery following for possible need for nephrectomy if he continues to bleed.  Would anticipate Cr may bump with multiple contrast loads for interventions.                  -Cr slowly improving, concern that he will get more contrast in coming days, will monitor                -Will need follow up in CKD clinic, has never seen nephrology- message sent to schedulers       Volume-No edema on exam, polyuric. ECHO showed normal IVC on 4/19.     HFrEF-EF 30-35% in Jan, rechecking today.  On Coreg and Lisinopril PTA.       Electrolytes-K and bicarb stable     BMD-Ca, Mag and Phos stable     Anemia-Hgb mildly low at baseline.       Nutrition-Taking PO      Recommendations were communicated to primary team via note       Review of Systems:   I reviewed the following systems:  Gen: No fevers or chills  CV: No CP at rest  Resp: No SOB at rest  GI: No N/V    Physical Exam:   I/O last 3 completed shifts:  In: 1140 [P.O.:1140]  Out: 500 [Urine:500]   BP (!) 151/112 (BP Location: Right arm, Cuff Size: Adult Large)   Pulse 96   Temp 98.6  F (37  C) (Oral)   Resp 20   Ht 1.88 m (6' 2\")   Wt 133.1 kg (293 lb 8 oz)   SpO2 98%   BMI 37.68 kg/m       GENERAL APPEARANCE: Obese male, alert and no distress  EYES: No scleral icterus  NECK:  No JVD  Pulmonary: lungs clear to auscultation with equal breath sounds bilaterally, no clubbing or cyanosis  CV: Regular rhythm, normal rate, no rub   - Edema none  GI: soft, nontender, normal bowel sounds  MS: no evidence of inflammation in joints, no muscle tenderness  : No Daugherty  SKIN: no rash, warm, dry  NEURO: mentation intact and speech normal    Labs:   All labs reviewed by me  Electrolytes/Renal -   Recent Labs   Lab Test 04/23/23  0613 " 04/22/23  1215 04/22/23  0744 04/22/23  0559 04/21/23  1145 04/21/23  0532     --   --  135*  --  134*   POTASSIUM 3.9  --   --  4.2  --  4.0   CHLORIDE 94*  --   --  95*  --  95*   CO2 28  --   --  27  --  27   BUN 21.5*  --   --  19.5  --  17.8   CR 1.48*  --   --  1.57*  --  1.63*   * 124* 98 126*   < > 97   EFREM 9.5  --   --  9.3  --  8.7   MAG 1.7  --   --  1.7  --  1.7   PHOS 3.3  --   --  4.0  --  3.5    < > = values in this interval not displayed.       CBC -   Recent Labs   Lab Test 04/23/23  0613 04/22/23  0559 04/21/23  0532   WBC 10.7 12.6* 16.5*   HGB 11.3* 11.5* 11.4*    264 240       LFTs -   Recent Labs   Lab Test 04/20/23  0534 04/18/23  1736 01/21/22  2045   ALKPHOS 92 120 119   BILITOTAL 1.1 0.9 0.9   ALT 25 21 60   AST 43 22 35   PROTTOTAL 6.8 7.3 6.8   ALBUMIN 3.7 4.2 2.6*       Iron Panel -   Recent Labs   Lab Test 04/19/23  0502 04/19/23  0436   IRON 57*  --    IRONSAT 17  --    HUSSEIN  --  57           Current Medications:    acetaminophen  650 mg Oral Q4H     [START ON 4/24/2023] amLODIPine  10 mg Oral Daily     carvedilol  25 mg Oral BID     lisinopril  40 mg Oral Daily     magnesium sulfate  2 g Intravenous Once     melatonin  3 mg Oral At Bedtime     polyethylene glycol  17 g Oral BID     sennosides  2 tablet Oral BID     sodium chloride (PF)  3 mL Intracatheter Q8H     spironolactone  25 mg Oral Daily     torsemide  20 mg Oral BID       Sara He MD   of Medicine  Department of Nephrology  [facility]

## 2023-04-23 NOTE — PROGRESS NOTES
[facility]    Medicine Progress Note - Medicine Service, MARDANTE TEAM 1    Date of Admission:  4/18/2023    Assessment & Plan   Miguel Ángel Wilson is a 32 year old male admitted on 4/18/2023 who is being transferred out of the ICU on 4/20/2023. He has a history of hypertension, atrial fibrillation, CKD, HFrEF who was admitted with left flank pain found to have subcapsular hematoma s/p IR embolization initially admitted to ICU for close hemodynamic monitoring and hypertension requiring nicardipine drip. Transferred out of ICU on 4/21. Plan for left renal angiogram (w/ anesthesia) w/ possible embolization vs direct stick on 4/24.     Changes Today:  - Increased spironolactone, amlodipine, carvedilol  - Continued inpatient monitoring (risk of pseudoaneurysm rupture) until IR intervention on 4/24  - SW consult given housing concerns  - Discontinued telemetry    Acute Issues:  Left renal subcapsular hematoma s/p IR embolization (4/18)  Left renal pseudoaneurysm (17mm)  Patient presented w/ L flank pain found to have subcapsular hematoma on CT. No trauma or inciting event for the hematoma, though patient is on anticoagulation. Underwent IR embolization on 4/18 (unable to identify discrete vessel of extravasation, superior pole segmental vessel embolized, persistent pseudoaneurysm). Repeat CTA on 4/19 demonstrated postprocedural changes of superior L renal artery embolization w/ persistent faint filling of pseudoaneurysm and resolution of area of contrast extravasation.   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 EKG without concern for ACS. Patient ran out of lisinopril PTA. Prior hyperaldo workup (11/2021, although not AM sample): PAC 11.5, PRA 0.4, K 3.4.  - Nephrology following; recs appreciated   - Goal SBP > 120 (given longstanding HTN)  - s/p nicardipine gtt (4/18-4/19)  - Continue PTA lisinopril 40mg daily  - Start carvedilol 25mg BID (instead of PTA metoprolol succinate 25mg BID)  - Start amlodipine 10mg daily  - Start spironolactone 25mg daily  - IV labetalol PRN for SBP > 180 or DBP > 110  - Sleep study as outpatient    Chronic/Stable/Resolved Issues:  HFrEF (EF 30-35%); not in acute exacerbation  Patient initially diagnosed with HFrEF in November of 2021 after presenting to [facility] with dyspnea and lower extremity edema. TTE showed LVEF of 45% and LVH, discharged on lisinopril and carvedilol at that time. Pt. Presented to [facility] cardiology in January of 2022 with ongoing dyspnea and worsening LE edema. pBNP was 6,200 and troponin 141 --> 104 at that time. He underwent aggressive diuresis with bumex gtt with improvement of edema. Patient's carvedilol was replaced with metoprolol at that time and he was started on torsemide and apixaban in addition to his prior lisinopril. Now on this admission, patient's BNP found to be 2,256 and troponin of 26. EKG showed sinus rhythm with LV hypertrophy and QT prolongation. EF was 30-35% on 01/21/22. Patient was given a dose of 60mg IV lasix on admission. CXR without significant edema and patient without edema on exam.   - Cardiology signed off; recs appreciated   - Needs outpatient follow up for further cardiomyopathy workup (eg, cMRI)  - GDMT: continue PTA lisinopril; started spironolactone; carvedilol instead of PTA metoprolol succinate  - Continue PTA torsemide 20mg BID     Atrial fibrillation  CHADSVASC 2.  - Start  "carvedilol 25mg BID (instead of PTA metoprolol succinate 25mg BID) for better BP control  - Holding PTA apixaban      Demand ischemia in setting of hypertensive emergency; downtrended  Patient with reproducible chest pain on admission.      CKD  Unclear etiology of CKD, possibly in the setting of uncontrolled hypertension. Baseline creatinine appears to be 1.6-1.7 though patient has not had close follow up.  - Nephrology following; recs appreciated   - Anticipate in YOGI following contrast load   - Needs outpatient follow up       Diet: Regular Diet Adult  NPO per Anesthesia Guidelines for Procedure/Surgery Except for: Meds    DVT Prophylaxis: Pneumatic Compression Devices  Daugherty Catheter: Not present  Lines: None     Cardiac Monitoring: None  Code Status: Full Code      Clinically Significant Risk Factors                       # DMII: A1C = 6.7 % (Ref range: <5.7 %) within past 6 months   # Obesity: Estimated body mass index is 37.68 kg/m  as calculated from the following:    Height as of this encounter: 1.88 m (6' 2\").    Weight as of this encounter: 133.1 kg (293 lb 8 oz).          Disposition Plan      Expected Discharge Date: 04/27/2023        Discharge Comments: Repeat IR angio with anesthesia on 4/24.        The patient's care was discussed with the Attending Physician, Dr. Clay.    Nelda Martinez MD  Medicine Service, [facility] TEAM [address] [place] [address]  Securely message with [employer] (more info)  Text page via [facility] Paging/Directory   See signed in provider for up to date coverage information  ______________________________________________________________________    Interval History   No acute events overnight. Denies headache, vision changes, chest pain, SOB, abdominal pain, flank pain. Feels comfortable and happy that his room was switched. Aware of IR intervention on 4/24. Requesting to speak with SW about housing concerns.    Physical Exam   Vital Signs: Temp: " 98.7  F (37.1  C) Temp src: Oral BP: (!) 175/133 Pulse: 99   Resp: 19 SpO2: 99 % O2 Device: None (Room air)    Weight: 293 lbs 8 oz    Constitutional: NAD. Conversant. Generally well-appearing.   HEENT: NC/AT, anicteric sclera, pupils round and equal, EOMI.  CV: RRR, normal S1/S2, no murmurs/gallops/rubs.  Pulm: Non-labored respirations on room air, CTAB, no wheezes or crackles.  Abdomen: Tenderness to deep palpation along left lateral portion of abdomen. Normoactive bowel sounds, soft, non-distended, non-tender. No CVA tenderness.  Extremities: Warm and well-perfused, no peripheral edema, cyanosis, or clubbing.  Skin: No jaundice, no rashes on exposed skin.  Neuro: Alert and oriented x3, moves all four extremities independently.  Psych: Normal affect.    Medical Decision Making       Data     I have personally reviewed the following data over the past 24 hrs:    10.7  \   11.3 (L)   / 284     136 94 (L) 21.5 (H) /  101 (H)   3.9 28 1.48 (H) \       Procal: N/A CRP: N/A Lactic Acid: 1.2

## 2023-04-24 ENCOUNTER — TELEPHONE (OUTPATIENT)
Dept: MULTI SPECIALTY CLINIC | Facility: CLINIC | Age: 33
End: 2023-04-24
Payer: COMMERCIAL

## 2023-04-24 ENCOUNTER — APPOINTMENT (OUTPATIENT)
Dept: INTERVENTIONAL RADIOLOGY/VASCULAR | Facility: CLINIC | Age: 33
End: 2023-04-24
Attending: NURSE PRACTITIONER
Payer: COMMERCIAL

## 2023-04-24 ENCOUNTER — ANESTHESIA (OUTPATIENT)
Dept: SURGERY | Facility: CLINIC | Age: 33
End: 2023-04-24
Payer: COMMERCIAL

## 2023-04-24 LAB
ANION GAP SERPL CALCULATED.3IONS-SCNC: 12 MMOL/L (ref 7–15)
BASOPHILS # BLD AUTO: 0.1 10E3/UL (ref 0–0.2)
BASOPHILS NFR BLD AUTO: 1 %
BUN SERPL-MCNC: 21.6 MG/DL (ref 6–20)
CALCIUM SERPL-MCNC: 9.4 MG/DL (ref 8.6–10)
CHLORIDE SERPL-SCNC: 98 MMOL/L (ref 98–107)
CREAT SERPL-MCNC: 1.41 MG/DL (ref 0.67–1.17)
DEPRECATED HCO3 PLAS-SCNC: 27 MMOL/L (ref 22–29)
EOSINOPHIL # BLD AUTO: 0.2 10E3/UL (ref 0–0.7)
EOSINOPHIL NFR BLD AUTO: 2 %
ERYTHROCYTE [DISTWIDTH] IN BLOOD BY AUTOMATED COUNT: 15.9 % (ref 10–15)
GFR SERPL CREATININE-BSD FRML MDRD: 68 ML/MIN/1.73M2
GLUCOSE BLDC GLUCOMTR-MCNC: 90 MG/DL (ref 70–99)
GLUCOSE SERPL-MCNC: 100 MG/DL (ref 70–99)
HCT VFR BLD AUTO: 38.2 % (ref 40–53)
HGB BLD-MCNC: 11.4 G/DL (ref 13.3–17.7)
IMM GRANULOCYTES # BLD: 0 10E3/UL
IMM GRANULOCYTES NFR BLD: 0 %
LYMPHOCYTES # BLD AUTO: 0.9 10E3/UL (ref 0.8–5.3)
LYMPHOCYTES NFR BLD AUTO: 10 %
MAGNESIUM SERPL-MCNC: 2 MG/DL (ref 1.7–2.3)
MCH RBC QN AUTO: 24.7 PG (ref 26.5–33)
MCHC RBC AUTO-ENTMCNC: 29.8 G/DL (ref 31.5–36.5)
MCV RBC AUTO: 83 FL (ref 78–100)
MONOCYTES # BLD AUTO: 1.1 10E3/UL (ref 0–1.3)
MONOCYTES NFR BLD AUTO: 12 %
NEUTROPHILS # BLD AUTO: 6.8 10E3/UL (ref 1.6–8.3)
NEUTROPHILS NFR BLD AUTO: 75 %
NRBC # BLD AUTO: 0 10E3/UL
NRBC BLD AUTO-RTO: 0 /100
PHOSPHATE SERPL-MCNC: 3.6 MG/DL (ref 2.5–4.5)
PLATELET # BLD AUTO: 320 10E3/UL (ref 150–450)
POTASSIUM SERPL-SCNC: 4.9 MMOL/L (ref 3.4–5.3)
RBC # BLD AUTO: 4.62 10E6/UL (ref 4.4–5.9)
SODIUM SERPL-SCNC: 137 MMOL/L (ref 136–145)
WBC # BLD AUTO: 9.1 10E3/UL (ref 4–11)

## 2023-04-24 PROCEDURE — 37242 VASC EMBOLIZE/OCCLUDE ARTERY: CPT | Mod: GC | Performed by: RADIOLOGY

## 2023-04-24 PROCEDURE — 36415 COLL VENOUS BLD VENIPUNCTURE: CPT

## 2023-04-24 PROCEDURE — 999N000141 HC STATISTIC PRE-PROCEDURE NURSING ASSESSMENT

## 2023-04-24 PROCEDURE — 250N000013 HC RX MED GY IP 250 OP 250 PS 637

## 2023-04-24 PROCEDURE — 250N000011 HC RX IP 250 OP 636: Performed by: NURSE PRACTITIONER

## 2023-04-24 PROCEDURE — C1769 GUIDE WIRE: HCPCS

## 2023-04-24 PROCEDURE — C1887 CATHETER, GUIDING: HCPCS

## 2023-04-24 PROCEDURE — C1889 IMPLANT/INSERT DEVICE, NOC: HCPCS

## 2023-04-24 PROCEDURE — 272N000143 HC KIT CR3

## 2023-04-24 PROCEDURE — 80048 BASIC METABOLIC PNL TOTAL CA: CPT

## 2023-04-24 PROCEDURE — 250N000009 HC RX 250

## 2023-04-24 PROCEDURE — 04LA3DZ OCCLUSION OF LEFT RENAL ARTERY WITH INTRALUMINAL DEVICE, PERCUTANEOUS APPROACH: ICD-10-PCS | Performed by: RADIOLOGY

## 2023-04-24 PROCEDURE — 99232 SBSQ HOSP IP/OBS MODERATE 35: CPT | Mod: GC | Performed by: INTERNAL MEDICINE

## 2023-04-24 PROCEDURE — 258N000003 HC RX IP 258 OP 636

## 2023-04-24 PROCEDURE — 36251 INS CATH REN ART 1ST UNILAT: CPT

## 2023-04-24 PROCEDURE — 250N000009 HC RX 250: Performed by: RADIOLOGY

## 2023-04-24 PROCEDURE — 250N000013 HC RX MED GY IP 250 OP 250 PS 637: Performed by: STUDENT IN AN ORGANIZED HEALTH CARE EDUCATION/TRAINING PROGRAM

## 2023-04-24 PROCEDURE — 37244 VASC EMBOLIZE/OCCLUDE BLEED: CPT

## 2023-04-24 PROCEDURE — 272N000566 HC SHEATH CR3

## 2023-04-24 PROCEDURE — 250N000011 HC RX IP 250 OP 636

## 2023-04-24 PROCEDURE — 84100 ASSAY OF PHOSPHORUS: CPT

## 2023-04-24 PROCEDURE — 85004 AUTOMATED DIFF WBC COUNT: CPT

## 2023-04-24 PROCEDURE — 250N000025 HC SEVOFLURANE, PER MIN

## 2023-04-24 PROCEDURE — C1760 CLOSURE DEV, VASC: HCPCS

## 2023-04-24 PROCEDURE — 272N000188 HC ACCESSORY CR12

## 2023-04-24 PROCEDURE — 120N000003 HC R&B IMCU UMMC

## 2023-04-24 PROCEDURE — 36253 INS CATH REN ART 2ND+ UNILAT: CPT | Mod: XU | Performed by: RADIOLOGY

## 2023-04-24 PROCEDURE — B4171ZZ FLUOROSCOPY OF LEFT RENAL ARTERY USING LOW OSMOLAR CONTRAST: ICD-10-PCS | Performed by: RADIOLOGY

## 2023-04-24 PROCEDURE — 710N000010 HC RECOVERY PHASE 1, LEVEL 2, PER MIN

## 2023-04-24 PROCEDURE — 272N000504 HC NEEDLE CR4

## 2023-04-24 PROCEDURE — 250N000011 HC RX IP 250 OP 636: Performed by: STUDENT IN AN ORGANIZED HEALTH CARE EDUCATION/TRAINING PROGRAM

## 2023-04-24 PROCEDURE — 370N000017 HC ANESTHESIA TECHNICAL FEE, PER MIN

## 2023-04-24 PROCEDURE — 83735 ASSAY OF MAGNESIUM: CPT

## 2023-04-24 PROCEDURE — 99233 SBSQ HOSP IP/OBS HIGH 50: CPT | Mod: FS | Performed by: CLINICAL NURSE SPECIALIST

## 2023-04-24 PROCEDURE — 76937 US GUIDE VASCULAR ACCESS: CPT | Mod: 26 | Performed by: RADIOLOGY

## 2023-04-24 PROCEDURE — 76937 US GUIDE VASCULAR ACCESS: CPT

## 2023-04-24 PROCEDURE — 255N000002 HC RX 255 OP 636: Performed by: RADIOLOGY

## 2023-04-24 RX ORDER — HYDROMORPHONE HYDROCHLORIDE 2 MG/1
2 TABLET ORAL EVERY 4 HOURS PRN
Status: DISCONTINUED | OUTPATIENT
Start: 2023-04-24 | End: 2023-04-25 | Stop reason: HOSPADM

## 2023-04-24 RX ORDER — HYDROMORPHONE HCL IN WATER/PF 6 MG/30 ML
0.4 PATIENT CONTROLLED ANALGESIA SYRINGE INTRAVENOUS EVERY 5 MIN PRN
Status: DISCONTINUED | OUTPATIENT
Start: 2023-04-24 | End: 2023-04-24

## 2023-04-24 RX ORDER — ONDANSETRON 2 MG/ML
INJECTION INTRAMUSCULAR; INTRAVENOUS PRN
Status: DISCONTINUED | OUTPATIENT
Start: 2023-04-24 | End: 2023-04-24

## 2023-04-24 RX ORDER — ONDANSETRON 4 MG/1
4 TABLET, ORALLY DISINTEGRATING ORAL EVERY 30 MIN PRN
Status: DISCONTINUED | OUTPATIENT
Start: 2023-04-24 | End: 2023-04-24

## 2023-04-24 RX ORDER — ONDANSETRON 2 MG/ML
4 INJECTION INTRAMUSCULAR; INTRAVENOUS EVERY 30 MIN PRN
Status: DISCONTINUED | OUTPATIENT
Start: 2023-04-24 | End: 2023-04-24

## 2023-04-24 RX ORDER — PROCHLORPERAZINE 25 MG
25 SUPPOSITORY, RECTAL RECTAL EVERY 12 HOURS PRN
Status: DISCONTINUED | OUTPATIENT
Start: 2023-04-24 | End: 2023-04-25 | Stop reason: HOSPADM

## 2023-04-24 RX ORDER — SODIUM CHLORIDE 9 MG/ML
INJECTION, SOLUTION INTRAVENOUS CONTINUOUS
Status: DISCONTINUED | OUTPATIENT
Start: 2023-04-24 | End: 2023-04-25 | Stop reason: HOSPADM

## 2023-04-24 RX ORDER — ONDANSETRON 2 MG/ML
4 INJECTION INTRAMUSCULAR; INTRAVENOUS EVERY 6 HOURS PRN
Status: DISCONTINUED | OUTPATIENT
Start: 2023-04-24 | End: 2023-04-25 | Stop reason: HOSPADM

## 2023-04-24 RX ORDER — CEFAZOLIN SODIUM IN 0.9 % NACL 3 G/100 ML
3 INTRAVENOUS SOLUTION, PIGGYBACK (ML) INTRAVENOUS
Status: COMPLETED | OUTPATIENT
Start: 2023-04-24 | End: 2023-04-24

## 2023-04-24 RX ORDER — FENTANYL CITRATE 50 UG/ML
INJECTION, SOLUTION INTRAMUSCULAR; INTRAVENOUS PRN
Status: DISCONTINUED | OUTPATIENT
Start: 2023-04-24 | End: 2023-04-24

## 2023-04-24 RX ORDER — FENTANYL CITRATE 50 UG/ML
25 INJECTION, SOLUTION INTRAMUSCULAR; INTRAVENOUS EVERY 5 MIN PRN
Status: DISCONTINUED | OUTPATIENT
Start: 2023-04-24 | End: 2023-04-24

## 2023-04-24 RX ORDER — SODIUM CHLORIDE, SODIUM LACTATE, POTASSIUM CHLORIDE, CALCIUM CHLORIDE 600; 310; 30; 20 MG/100ML; MG/100ML; MG/100ML; MG/100ML
INJECTION, SOLUTION INTRAVENOUS CONTINUOUS PRN
Status: DISCONTINUED | OUTPATIENT
Start: 2023-04-24 | End: 2023-04-24

## 2023-04-24 RX ORDER — SPIRONOLACTONE 25 MG/1
100 TABLET ORAL DAILY
Status: DISCONTINUED | OUTPATIENT
Start: 2023-04-25 | End: 2023-04-25 | Stop reason: HOSPADM

## 2023-04-24 RX ORDER — ONDANSETRON 4 MG/1
4 TABLET, ORALLY DISINTEGRATING ORAL EVERY 6 HOURS PRN
Status: DISCONTINUED | OUTPATIENT
Start: 2023-04-24 | End: 2023-04-25 | Stop reason: HOSPADM

## 2023-04-24 RX ORDER — SPIRONOLACTONE 25 MG/1
50 TABLET ORAL DAILY
Status: DISCONTINUED | OUTPATIENT
Start: 2023-04-24 | End: 2023-04-24

## 2023-04-24 RX ORDER — HEPARIN SODIUM 200 [USP'U]/100ML
1 INJECTION, SOLUTION INTRAVENOUS CONTINUOUS PRN
Status: DISCONTINUED | OUTPATIENT
Start: 2023-04-24 | End: 2023-04-24

## 2023-04-24 RX ORDER — PROPOFOL 10 MG/ML
INJECTION, EMULSION INTRAVENOUS PRN
Status: DISCONTINUED | OUTPATIENT
Start: 2023-04-24 | End: 2023-04-24

## 2023-04-24 RX ORDER — IODIXANOL 320 MG/ML
100 INJECTION, SOLUTION INTRAVASCULAR ONCE
Status: COMPLETED | OUTPATIENT
Start: 2023-04-24 | End: 2023-04-24

## 2023-04-24 RX ORDER — HYDROMORPHONE HCL IN WATER/PF 6 MG/30 ML
0.2 PATIENT CONTROLLED ANALGESIA SYRINGE INTRAVENOUS EVERY 5 MIN PRN
Status: DISCONTINUED | OUTPATIENT
Start: 2023-04-24 | End: 2023-04-24

## 2023-04-24 RX ORDER — HALOPERIDOL 5 MG/ML
1 INJECTION INTRAMUSCULAR
Status: DISCONTINUED | OUTPATIENT
Start: 2023-04-24 | End: 2023-04-24

## 2023-04-24 RX ORDER — PROCHLORPERAZINE MALEATE 5 MG
10 TABLET ORAL EVERY 6 HOURS PRN
Status: DISCONTINUED | OUTPATIENT
Start: 2023-04-24 | End: 2023-04-25 | Stop reason: HOSPADM

## 2023-04-24 RX ORDER — LIDOCAINE HYDROCHLORIDE 20 MG/ML
INJECTION, SOLUTION INFILTRATION; PERINEURAL PRN
Status: DISCONTINUED | OUTPATIENT
Start: 2023-04-24 | End: 2023-04-24

## 2023-04-24 RX ORDER — SPIRONOLACTONE 100 MG/1
100 TABLET, FILM COATED ORAL DAILY
Qty: 90 TABLET | Refills: 3 | Status: SHIPPED | OUTPATIENT
Start: 2023-04-25 | End: 2024-07-29

## 2023-04-24 RX ORDER — HYDROMORPHONE HYDROCHLORIDE 4 MG/1
4 TABLET ORAL EVERY 4 HOURS PRN
Status: DISCONTINUED | OUTPATIENT
Start: 2023-04-24 | End: 2023-04-25 | Stop reason: HOSPADM

## 2023-04-24 RX ORDER — FENTANYL CITRATE 50 UG/ML
50 INJECTION, SOLUTION INTRAMUSCULAR; INTRAVENOUS EVERY 5 MIN PRN
Status: DISCONTINUED | OUTPATIENT
Start: 2023-04-24 | End: 2023-04-24

## 2023-04-24 RX ORDER — LIDOCAINE HYDROCHLORIDE 10 MG/ML
1-30 INJECTION, SOLUTION EPIDURAL; INFILTRATION; INTRACAUDAL; PERINEURAL
Status: COMPLETED | OUTPATIENT
Start: 2023-04-24 | End: 2023-04-24

## 2023-04-24 RX ORDER — SODIUM CHLORIDE, SODIUM LACTATE, POTASSIUM CHLORIDE, CALCIUM CHLORIDE 600; 310; 30; 20 MG/100ML; MG/100ML; MG/100ML; MG/100ML
INJECTION, SOLUTION INTRAVENOUS CONTINUOUS
Status: DISCONTINUED | OUTPATIENT
Start: 2023-04-24 | End: 2023-04-24

## 2023-04-24 RX ORDER — SPIRONOLACTONE 25 MG/1
50 TABLET ORAL ONCE
Status: COMPLETED | OUTPATIENT
Start: 2023-04-24 | End: 2023-04-24

## 2023-04-24 RX ADMIN — PHENYLEPHRINE HYDROCHLORIDE 100 MCG: 10 INJECTION INTRAVENOUS at 15:48

## 2023-04-24 RX ADMIN — LABETALOL HYDROCHLORIDE 20 MG: 5 INJECTION, SOLUTION INTRAVENOUS at 04:23

## 2023-04-24 RX ADMIN — PROPOFOL 100 MG: 10 INJECTION, EMULSION INTRAVENOUS at 15:29

## 2023-04-24 RX ADMIN — PHENYLEPHRINE HYDROCHLORIDE 200 MCG: 10 INJECTION INTRAVENOUS at 15:56

## 2023-04-24 RX ADMIN — Medication 50 MG: at 15:16

## 2023-04-24 RX ADMIN — ACETAMINOPHEN 650 MG: 325 TABLET, FILM COATED ORAL at 11:51

## 2023-04-24 RX ADMIN — TORSEMIDE 20 MG: 20 TABLET ORAL at 09:07

## 2023-04-24 RX ADMIN — OXYCODONE HYDROCHLORIDE 5 MG: 5 TABLET ORAL at 19:59

## 2023-04-24 RX ADMIN — PROPOFOL 50 MG: 10 INJECTION, EMULSION INTRAVENOUS at 16:53

## 2023-04-24 RX ADMIN — ONDANSETRON 4 MG: 2 INJECTION INTRAMUSCULAR; INTRAVENOUS at 17:32

## 2023-04-24 RX ADMIN — POLYETHYLENE GLYCOL 3350 17 G: 17 POWDER, FOR SOLUTION ORAL at 20:00

## 2023-04-24 RX ADMIN — Medication 3 MG: at 20:50

## 2023-04-24 RX ADMIN — TORSEMIDE 20 MG: 20 TABLET ORAL at 20:49

## 2023-04-24 RX ADMIN — Medication 3 G: at 15:29

## 2023-04-24 RX ADMIN — SENNOSIDES 1 TABLET: 8.6 TABLET, FILM COATED ORAL at 09:07

## 2023-04-24 RX ADMIN — PHENYLEPHRINE HYDROCHLORIDE 50 MCG: 10 INJECTION INTRAVENOUS at 15:42

## 2023-04-24 RX ADMIN — PHENYLEPHRINE HYDROCHLORIDE 0.5 MCG/KG/MIN: 10 INJECTION INTRAVENOUS at 16:07

## 2023-04-24 RX ADMIN — LABETALOL HYDROCHLORIDE 20 MG: 5 INJECTION, SOLUTION INTRAVENOUS at 10:11

## 2023-04-24 RX ADMIN — IODIXANOL 140 ML: 320 INJECTION, SOLUTION INTRAVASCULAR at 17:32

## 2023-04-24 RX ADMIN — PHENYLEPHRINE HYDROCHLORIDE 200 MCG: 10 INJECTION INTRAVENOUS at 16:11

## 2023-04-24 RX ADMIN — SPIRONOLACTONE 50 MG: 25 TABLET, FILM COATED ORAL at 11:51

## 2023-04-24 RX ADMIN — SODIUM CHLORIDE, POTASSIUM CHLORIDE, SODIUM LACTATE AND CALCIUM CHLORIDE: 600; 310; 30; 20 INJECTION, SOLUTION INTRAVENOUS at 15:16

## 2023-04-24 RX ADMIN — CARVEDILOL 25 MG: 25 TABLET, FILM COATED ORAL at 19:59

## 2023-04-24 RX ADMIN — PROPOFOL 200 MG: 10 INJECTION, EMULSION INTRAVENOUS at 15:24

## 2023-04-24 RX ADMIN — PHENYLEPHRINE HYDROCHLORIDE 200 MCG: 10 INJECTION INTRAVENOUS at 16:07

## 2023-04-24 RX ADMIN — OXYCODONE HYDROCHLORIDE 5 MG: 5 TABLET ORAL at 04:22

## 2023-04-24 RX ADMIN — LIDOCAINE HYDROCHLORIDE 100 MG: 20 INJECTION, SOLUTION INFILTRATION; PERINEURAL at 15:24

## 2023-04-24 RX ADMIN — PHENYLEPHRINE HYDROCHLORIDE 200 MCG: 10 INJECTION INTRAVENOUS at 16:03

## 2023-04-24 RX ADMIN — ACETAMINOPHEN 650 MG: 325 TABLET, FILM COATED ORAL at 04:22

## 2023-04-24 RX ADMIN — ACETAMINOPHEN 650 MG: 325 TABLET, FILM COATED ORAL at 09:07

## 2023-04-24 RX ADMIN — SENNOSIDES 2 TABLET: 8.6 TABLET, FILM COATED ORAL at 19:59

## 2023-04-24 RX ADMIN — ACETAMINOPHEN 650 MG: 325 TABLET, FILM COATED ORAL at 20:00

## 2023-04-24 RX ADMIN — LISINOPRIL 40 MG: 20 TABLET ORAL at 09:07

## 2023-04-24 RX ADMIN — MIDAZOLAM 2 MG: 1 INJECTION INTRAMUSCULAR; INTRAVENOUS at 15:02

## 2023-04-24 RX ADMIN — METHOCARBAMOL 1000 MG: 500 TABLET ORAL at 22:37

## 2023-04-24 RX ADMIN — SUGAMMADEX 200 MG: 100 INJECTION, SOLUTION INTRAVENOUS at 17:32

## 2023-04-24 RX ADMIN — CARVEDILOL 25 MG: 25 TABLET, FILM COATED ORAL at 09:07

## 2023-04-24 RX ADMIN — LIDOCAINE HYDROCHLORIDE 30 ML: 10 INJECTION, SOLUTION EPIDURAL; INFILTRATION; INTRACAUDAL; PERINEURAL at 17:29

## 2023-04-24 RX ADMIN — FENTANYL CITRATE 100 MCG: 50 INJECTION, SOLUTION INTRAMUSCULAR; INTRAVENOUS at 15:24

## 2023-04-24 RX ADMIN — SPIRONOLACTONE 50 MG: 25 TABLET, FILM COATED ORAL at 09:07

## 2023-04-24 ASSESSMENT — ACTIVITIES OF DAILY LIVING (ADL)
ADLS_ACUITY_SCORE: 18

## 2023-04-24 ASSESSMENT — LIFESTYLE VARIABLES: TOBACCO_USE: 1

## 2023-04-24 ASSESSMENT — NEW YORK HEART ASSOCIATION (NYHA) CLASSIFICATION: NYHA FUNCTIONAL CLASS: I

## 2023-04-24 ASSESSMENT — ENCOUNTER SYMPTOMS: DYSRHYTHMIAS: 1

## 2023-04-24 NOTE — ANESTHESIA CARE TRANSFER NOTE
Patient: Miguel Ángel Wilson    Procedure: Procedure(s):  ANESTHESIA OUT OF OR Left Renal Aniogram possible Embolization possible Direct Stick @1300       Diagnosis: Pseudoaneurysm (H) [I72.9]  Diagnosis Additional Information: No value filed.    Anesthesia Type:   No value filed.     Note:    Oropharynx: oropharynx clear of all foreign objects and spontaneously breathing  Level of Consciousness: awake  Oxygen Supplementation: face mask  Level of Supplemental Oxygen (L/min / FiO2): 8  Independent Airway: airway patency satisfactory and stable  Dentition: dentition unchanged  Vital Signs Stable: post-procedure vital signs reviewed and stable  Report to RN Given: handoff report given  Patient transferred to: PACU    Handoff Report: Identifed the Patient, Identified the Reponsible Provider, Reviewed the pertinent medical history, Discussed the surgical course, Reviewed Intra-OP anesthesia mangement and issues during anesthesia, Set expectations for post-procedure period and Allowed opportunity for questions and acknowledgement of understanding      Vitals:  Vitals Value Taken Time   /74 04/24/23 1750   Temp     Pulse 88 04/24/23 1752   Resp 13 04/24/23 1752   SpO2 95 % 04/24/23 1752   Vitals shown include unvalidated device data.    Electronically Signed By: ASHWINI Zarco CRNA  April 24, 2023  5:54 PM

## 2023-04-24 NOTE — PROGRESS NOTES
[facility]    Medicine Progress Note - Medicine Service, MARDANTE TEAM 1    Date of Admission:  4/18/2023    Assessment & Plan   Miguel Ángel Wilson is a 32 year old male admitted on 4/18/2023 who is being transferred out of the ICU on 4/20/2023. He has a history of hypertension, atrial fibrillation, CKD, HFrEF who was admitted with left flank pain found to have subcapsular hematoma s/p IR embolization initially admitted to ICU for close hemodynamic monitoring and hypertension requiring nicardipine drip. Transferred out of ICU on 4/21. Plan for left renal angiogram (w/ anesthesia) w/ possible embolization vs direct stick on 4/24.     Changes Today:  - IR procedure today  - Discontinued amlodipine  - Increased spironolactone to 100mg daily (will continue to titrate upwards as able)    Acute Issues:  Left renal subcapsular hematoma s/p IR embolization (4/18)  Left renal pseudoaneurysm (17mm)  Patient presented w/ L flank pain found to have subcapsular hematoma on CT. No trauma or inciting event for the hematoma, though patient is on anticoagulation. Underwent IR embolization on 4/18 (unable to identify discrete vessel of extravasation, superior pole segmental vessel embolized, persistent pseudoaneurysm). Repeat CTA on 4/19 demonstrated postprocedural changes of superior L renal artery embolization w/ persistent faint filling of pseudoaneurysm and resolution of area of contrast extravasation.   - Urology following peripherally; recs appreciated   - No surgical intervention   - Will arrange outpatient f/u in 6 weeks w/ CT prior  - IR following; recs appreciated   - Risk of pseudoaneurysm rupture; patient should remain inpatient until repeat IR intervention on 4/24   - Plan for left renal angiogram (w/ anesthesia) w/ possible embolization vs direct stick on 4/24    Hypertension  Suspected hyperaldosteronism  Hypertensive emergency; resolved  Demand ischemia 2/2 above;  resolved  Patient with initial BP of 200/154 on admission w/ troponin 26 >>> 87 > 84. EKG without concern for ACS. Patient ran out of lisinopril PTA. Prior hyperaldo workup (11/2021, although not AM sample): PAC 11.5, PRA 0.4, K 3.4.  - Nephrology following; recs appreciated   - Goal SBP > 120 (given longstanding HTN)  - s/p nicardipine gtt (4/18-4/19)  - Continue PTA lisinopril 40mg daily  - Start carvedilol 25mg BID (instead of PTA metoprolol succinate 25mg BID)  - Started spironolactone 100mg daily (titrate as able up to 200mg daily)  - Trialed amlodipine; discontinued d/t hx headache  - IV labetalol PRN for SBP > 180 or DBP > 110  - Sleep study as outpatient    Chronic/Stable/Resolved Issues:  HFrEF (EF 30-35%); not in acute exacerbation  Patient initially diagnosed with HFrEF in November of 2021 after presenting to [facility] with dyspnea and lower extremity edema. TTE showed LVEF of 45% and LVH, discharged on lisinopril and carvedilol at that time. Pt. Presented to [facility] cardiology in January of 2022 with ongoing dyspnea and worsening LE edema. pBNP was 6,200 and troponin 141 --> 104 at that time. He underwent aggressive diuresis with bumex gtt with improvement of edema. Patient's carvedilol was replaced with metoprolol at that time and he was started on torsemide and apixaban in addition to his prior lisinopril. Now on this admission, patient's BNP found to be 2,256 and troponin of 26. EKG showed sinus rhythm with LV hypertrophy and QT prolongation. EF was 30-35% on 01/21/22. Patient was given a dose of 60mg IV lasix on admission. CXR without significant edema and patient without edema on exam.   - Cardiology signed off; recs appreciated   - Needs outpatient follow up for further cardiomyopathy workup (eg, cMRI)  - GDMT: continue PTA lisinopril; started spironolactone; carvedilol instead of PTA metoprolol succinate  - Continue PTA torsemide 20mg BID     Atrial fibrillation  CHADSVASC 2.  - Start carvedilol 25mg BID  "(instead of PTA metoprolol succinate 25mg BID) for better BP control  - Holding PTA apixaban      Demand ischemia in setting of hypertensive emergency; downtrended  Patient with reproducible chest pain on admission.      CKD  Unclear etiology of CKD, possibly in the setting of uncontrolled hypertension. Baseline creatinine appears to be 1.6-1.7 though patient has not had close follow up.  - Nephrology following; recs appreciated   - Anticipate in YOGI following contrast load   - Needs outpatient follow up       Diet: NPO per Anesthesia Guidelines for Procedure/Surgery Except for: Meds    DVT Prophylaxis: Pneumatic Compression Devices  Daugherty Catheter: Not present  Lines: None     Cardiac Monitoring: None  Code Status: Full Code      Clinically Significant Risk Factors                       # DMII: A1C = 6.7 % (Ref range: <5.7 %) within past 6 months   # Obesity: Estimated body mass index is 37.68 kg/m  as calculated from the following:    Height as of this encounter: 1.88 m (6' 2\").    Weight as of this encounter: 133.1 kg (293 lb 8 oz).          Disposition Plan      Expected Discharge Date: 04/27/2023        Discharge Comments: Repeat IR angio with anesthesia on 4/24.        The patient's care was discussed with the Attending Physician, Dr. Clay.    Nelda Martinez MD  Medicine Service, [facility] TEAM [address] [place] [address]  Securely message with [employer] (more info)  Text page via [employer] Paging/Directory   See signed in provider for up to date coverage information  ______________________________________________________________________    Interval History   No acute events overnight. Attempted to leave AMA yesterday evening, but returned. BP elevated; given PRN labetalol x2. Denies headache, vision changes, chest pain, SOB, abdominal pain. IR procedure today.    Physical Exam   Vital Signs: Temp: 98.1  F (36.7  C) Temp src: Oral BP: (!) 147/115 Pulse: 94   Resp: 20 SpO2: 98 % " O2 Device: None (Room air)    Weight: 293 lbs 8 oz    Constitutional: NAD. Conversant. Generally well-appearing. Diaphoretic at times.  HEENT: NC/AT, anicteric sclera, pupils round and equal, EOMI.  CV: RRR, normal S1/S2, no murmurs/gallops/rubs.  Pulm: Non-labored respirations on room air, CTAB, no wheezes or crackles.  Abdomen: Tenderness to deep palpation along left lateral portion of abdomen. Normoactive bowel sounds, soft, non-distended, non-tender. No CVA tenderness.  Extremities: Warm and well-perfused, no peripheral edema, cyanosis, or clubbing.  Skin: No jaundice, no rashes on exposed skin.  Neuro: Alert and oriented x3, moves all four extremities independently.  Psych: Normal affect.    Medical Decision Making       Data     I have personally reviewed the following data over the past 24 hrs:    9.1  \   11.4 (L)   / 320     137 98 21.6 (H) /  100 (H)   4.9 27 1.41 (H) \

## 2023-04-24 NOTE — TELEPHONE ENCOUNTER
Left Voicemail (1st Attempt) for the patient to call back and schedule the following:    Appointment type: 2-4 week follow up   Provider: Chicho or gallo or ji   Return date: 5/2023 or next available   Specialty phone number: [phone]  Additional appointment(s) needed: lab  Additonal Notes: n/a

## 2023-04-24 NOTE — PLAN OF CARE
Neuro: A&Ox4.   Cardiac: Tele d/c'ed. Hypertensive, PRN labetolol given x1 for DBP  >110.   Respiratory: Sating >92% on RA. Lungs clear.  GI/: Adequate urine output. BM X1 in the evening  Diet/appetite:  NPO since midnight for procedure today  Activity:  independent  Pain: At acceptable level on current regimen. PRN oxycodone 5mg x1, scheduled tylenol  with relief.  Skin: No new deficits noted.  LDA's: L PIV    Plan: Today will have IR guided angiogram with possible IR intervention.  Continue with POC. Notify primary team with changes.

## 2023-04-24 NOTE — PROGRESS NOTES
Nephrology Progress Note  04/24/2023       Miguel Ángel Wilson is a 32 yom with longstanding uncontrolled HTN, a-fib, CKD, CHF admitted for L flank pain with imaging revealing subcapular hematoma and pseudoaneurysm.  Went for IR embolization early am 4/19.  Baseline Cr 1.6-1.8 since at least 2021, was 1.8 on admit and has been stable within his baseline.  Pt having severe HTN, Nephrology consulted for management of HTN in setting of page kidney.      Interval History :   Mr Wilson's Cr is 1.4 today, has improved without specific intervention other than controlling HTN.  Plan to return to IR today to evaluate L renal flow, will continue to follow.  Will need follow-up in CKD clinic particularly with acute events the last week.  On Coreg 25mg, Lisinopril 40mg and Spironolactone 25mg for HTN.  Some suspicion of hyperaldo but not officially diagnosed, has indication for spironolactone regardless due to CHF.    Assessment & Recommendations:   Hypertension-As outpt is on Lisinopril 40mg daily, Metoprolol XL 25mg daily and torsemide for heart failure.  Ran out of meds (see pharmacy note but more meds would have ran out in ~Jan 2023 based on refill dates) so unclear if his current HTN on admit is his baseline or worse due to subcapsular bleeding/page kidney.  Restarted on Lisinopril and Metoprolol with PRN hydral and labetalol and BP's are at goal of -150 for now.  Was on Nicardipine for ~12h but now weaned off.                 -Agree with resumption of home meds                               -Lisinopril 40mg                               -Metoprolol XL 25mg daily.                                -PRN hydral and labetalol but would try to keep SBP >120 given his baseline BP's.          -Adding spironolactone 12.5mg 4/20, increased to 25mg on 4/22 for CHF and ? Hyperaldo (suspected but not confirmed).                               -ECHO suggested euvolemic on 4/19, EF unchanged at 30-35%.      CKD with L Subcapsular  "bleeding-Baseline Cr 1.6-1.8 since at least 11/2021, surprisingly at baseline despite acute issues with L kidney.  Went to IR for embolization this am, getting repeat imaging to evaluate for ongoing bleeding, surgery following for possible need for nephrectomy if he continues to bleed.  Would anticipate Cr may bump with multiple contrast loads for interventions.                  -Cr at baseline, likely to have YOGI in coming days.                  -Will need follow up in CKD clinic, has never seen nephrology.       Volume-No edema on exam, polyuric. ECHO showed normal IVC on 4/19.     HFrEF-EF 30-35% in Jan, rechecking today.  On Coreg and Lisinopril PTA.       Electrolytes-K 3.9, bicarb 27, Na 135.       BMD-Ca 8.9, Mg 2.1, Phos 3.2 last check.       Anemia-Hgb 11.4, at baseline.       Nutrition-Taking PO PTA, NPO for procedures for now.       Time spent: 25 minutes on this date of encounter for chart review, physical exam, medical decision making and co-ordination of care.      Discussed with Dr Zheng     Recommendations were communicated to primary team via verbal communication.        Albert Lee, APRN CNS  Clinical Nurse Specialist  [phone]    Review of Systems:   I reviewed the following systems:  Gen: No fevers or chills  CV: No CP at rest  Resp: No SOB at rest  GI: No N/V    Physical Exam:   I/O last 3 completed shifts:  In: 930 [P.O.:880; I.V.:50]  Out: -    BP (!) 137/110 (BP Location: Right arm, Cuff Size: Adult Large)   Pulse 92   Temp 98.9  F (37.2  C) (Oral)   Resp 18   Ht 1.88 m (6' 2\")   Wt 133.1 kg (293 lb 8 oz)   SpO2 98%   BMI 37.68 kg/m       GENERAL APPEARANCE: Obese male, alert and no distress  EYES: No scleral icterus  NECK:  No JVD  Pulmonary: lungs clear to auscultation with equal breath sounds bilaterally, no clubbing or cyanosis  CV: Regular rhythm, normal rate, no rub   - Edema none  GI: soft, nontender, normal bowel sounds  MS: no evidence of inflammation in joints, no " muscle tenderness  : No Daugherty  SKIN: no rash, warm, dry  NEURO: mentation intact and speech normal    Labs:   All labs reviewed by me  Electrolytes/Renal -   Recent Labs   Lab Test 04/24/23  0531 04/23/23  0613 04/22/23  1215 04/22/23  0744 04/22/23  0559    136  --   --  135*   POTASSIUM 4.9 3.9  --   --  4.2   CHLORIDE 98 94*  --   --  95*   CO2 27 28  --   --  27   BUN 21.6* 21.5*  --   --  19.5   CR 1.41* 1.48*  --   --  1.57*   * 101* 124*   < > 126*   EFREM 9.4 9.5  --   --  9.3   MAG 2.0 1.7  --   --  1.7   PHOS 3.6 3.3  --   --  4.0    < > = values in this interval not displayed.       CBC -   Recent Labs   Lab Test 04/24/23  0531 04/23/23  0613 04/22/23  0559   WBC 9.1 10.7 12.6*   HGB 11.4* 11.3* 11.5*    284 264       LFTs -   Recent Labs   Lab Test 04/20/23  0534 04/18/23  1736 01/21/22  2045   ALKPHOS 92 120 119   BILITOTAL 1.1 0.9 0.9   ALT 25 21 60   AST 43 22 35   PROTTOTAL 6.8 7.3 6.8   ALBUMIN 3.7 4.2 2.6*       Iron Panel -   Recent Labs   Lab Test 04/19/23  0502 04/19/23  0436   IRON 57*  --    IRONSAT 17  --    HUSSEIN  --  57           Current Medications:    acetaminophen  650 mg Oral Q4H     carvedilol  25 mg Oral BID     ceFAZolin  3 g Intravenous Pre-Op/Pre-procedure x 1 dose     lisinopril  40 mg Oral Daily     melatonin  3 mg Oral At Bedtime     polyethylene glycol  17 g Oral BID     sennosides  2 tablet Oral BID     sodium chloride (PF)  3 mL Intracatheter Q8H     [START ON 4/25/2023] spironolactone  100 mg Oral Daily     torsemide  20 mg Oral BID         ISara, saw and evaluated this patient as part of a shared visit.  I have reviewed and discussed with the advanced practice provider their history, physical and plan.  I have personally performed the substantive portion of the medical decision making for this visit - please see the JOSE ARMANDO's documentation for the full details  I personally reviewed the vital signs, medications, labs and imaging.    Key  findings and management decisions made by me:  Hypertensive emergency, high christina / renin ratio noted in 2021, will start spironolactone    Sara Ac He  Date of Service (when I saw the patient): 4/20

## 2023-04-24 NOTE — PROCEDURES
[facility]    Procedure: Left renal artery embolization of pseudoaneurysm    Date/Time: 4/24/2023 5:40 PM    Performed by: Mike Camacho DO  Authorized by: Mike Camacho DO      UNIVERSAL PROTOCOL   Site Marked: NA  Prior Images Obtained and Reviewed:  Yes  Required items: Required blood products, implants, devices and special equipment available    Patient identity confirmed:  Verbally with patient, arm band, provided demographic data and hospital-assigned identification number  Patient was reevaluated immediately before administering moderate or deep sedation or anesthesia  Confirmation Checklist:  Patient's identity using two indicators, relevant allergies, procedure was appropriate and matched the consent or emergent situation and correct equipment/implants were available  Time out: Immediately prior to the procedure a time out was called    Universal Protocol: the Joint Commission Universal Protocol was followed    Preparation: Patient was prepped and draped in usual sterile fashion       ANESTHESIA    Anesthesia: Local infiltration  Local Anesthetic:  Lidocaine 1% without epinephrine      SEDATION  Patient Sedated: Yes (GA)    Sedation Type:  Deep  Sedation:  See MAR for details  Vital signs: Vital signs monitored during sedation    See dictated procedure note for full details.  Findings: Pseudoaneurysm successfully coiled across the inflow vessel, no flow identified upon completion.    Specimens: none    Complications: None    Condition: Stable    Plan: Routine aftercare, 3 hours of bedrest.      PROCEDURE    Patient Tolerance:  Patient tolerated the procedure well with no immediate complications  Length of time physician/provider present for 1:1 monitoring during sedation: 0 (General anesthesia)

## 2023-04-24 NOTE — IR NOTE
Patient Name: Miguel Ángel Wilson  Medical Record Number: [id number]  Today's Date: 4/24/2023    Procedure: Left Renal Angiogram   Proceduralist: Dr. Strong, Dr. Camacho  Pathology present: N/A    Procedure Start: 1546  Procedure end: 1735  Sedation medications administered: per anesthesia    Brief: Dr. Flores; Dr. Denson (Anesthesiologist); Michael Butler, CRNA; Antonella Cam, RT; Miguel Alvarado, RT; Hiral Sinha, RN     Report given to:  RN PACU  : N/A    Other Notes: Pt arrived to IR room 1 from . Consent reviewed. Pt denies any questions or concerns regarding procedure. Pt positioned supine and monitored per protocol. right groin access 5 fr sheath.    Angioseal Closure device deployed at 1732   Groin site appearance: Clean, Dry, Flat  Pedal pulse assessment:  +3    Flat bedrest for 2 hours.    Pt tolerated procedure without any noted complications. Pt transferred to PACU.

## 2023-04-24 NOTE — ANESTHESIA PROCEDURE NOTES
Airway       Patient location during procedure: OR       Procedure Start/Stop Times: 4/24/2023 3:28 PM  Staff -        Anesthesiologist:  Patrick Diaz MD       CRNA: Michael Vides APRN CRNA       Performed By: CRNA  Consent for Airway        Urgency: elective  Indications and Patient Condition       Indications for airway management: luisa-procedural       Induction type:intravenous       Mask difficulty assessment: 2 - vent by mask + OA or adjuvant +/- NMBA    Final Airway Details       Final airway type: endotracheal airway       Successful airway: ETT - single and Oral  Endotracheal Airway Details        ETT size (mm): 7.5       Cuffed: yes       Successful intubation technique: video laryngoscopy       VL Blade Size: Glidescope 4       Grade View of Cords: 2       Adjucts: stylet       Position: Right       Measured from: lips       Secured at (cm): 23       Bite block used: None    Post intubation assessment        Placement verified by: capnometry and equal breath sounds        Number of attempts at approach: 1       Number of other approaches attempted: 0       Secured with: silk tape       Ease of procedure: easy       Dentition: Intact and Unchanged    Medication(s) Administered   Medication Administration Time: 4/24/2023 3:28 PM    Additional Comments       Large tonsils obstructing view. Additionally, required front of neck pressure to view glottis.

## 2023-04-24 NOTE — ANESTHESIA PREPROCEDURE EVALUATION
Anesthesia Pre-Procedure Evaluation    Patient: Pino Arteaga   MRN: [id number] : 1990        Procedure : Procedure(s):  ANESTHESIA OUT OF OR Left Renal Aniogram possible Embolization possible Direct Stick @1300          Past Medical History:   Diagnosis Date    Atrial flutter with rapid ventricular response (H) 2022    Essential hypertension 2022    Hypertension     Morbid obesity (H) 2022    Tobacco use 2022      History reviewed. No pertinent surgical history.   No Known Allergies   Social History     Tobacco Use    Smoking status: Some Days    Smokeless tobacco: Never   Vaping Use    Vaping status: Not on file   Substance Use Topics    Alcohol use: No      Wt Readings from Last 1 Encounters:   23 133.1 kg (293 lb 8 oz)        Anesthesia Evaluation            ROS/MED HX  ENT/Pulmonary:     (+) tobacco use, Current use,     Neurologic:       Cardiovascular:     (+) hypertension-----CHF NYHA classification: I. dysrhythmias, a-flutter, Previous cardiac testing   Echo: Date: Results:  Name: PINO ARTEAGA  MRN: [id number]  : 1990  Study Date: 2023 11:40 AM  Age: 32 yrs  Gender: Male  Patient [facility]  Reason For Study: Heart Failure  Ordering Physician: JOSIE MELLO  Performed By: Danny Wilson     BSA: 2.6 m2  Height: 74 in  Weight: 299 lb  BP: 123/88 mmHg  ______________________________________________________________________________  Procedure  Complete Portable Echo Adult. Contrast Optison. 4 ml wasted. Patient was given  5 ml mixture of 3 ml Optison and 6 ml saline.  ______________________________________________________________________________  Interpretation Summary  Left ventricular size is normal.  The visual ejection fraction is 30-35%.  Right ventricular function, chamber size, wall motion, and thickness are  normal.  Right ventricular systolic pressure is 39mmHg above the right atrial pressure.  IVC diameter and respiratory changes fall into an  intermediate range  suggesting an RA pressure of 8 mmHg.  No pericardial effusion is present.  ______________________________________________________________________________  Left Ventricle  Left ventricular size is normal. Mild to moderate concentric wall thickening  consistent with left ventricular hypertrophy is present. The visual ejection  fraction is 30-35%. Left ventricular diastolic function is indeterminate.  Severe diffuse hypokinesis is present.     Right Ventricle  Right ventricular function, chamber size, wall motion, and thickness are  normal.     Atria  The right atria appears normal. Severe left atrial enlargement is present.     Mitral Valve  The mitral valve is normal. Trace mitral insufficiency is present.     Aortic Valve  The valve leaflets are not well visualized. On Doppler interrogation, there is  no significant stenosis or regurgitation.     Tricuspid Valve  The tricuspid valve is normal. Trace to mild tricuspid insufficiency is  present. Right ventricular systolic pressure is 39mmHg above the right atrial  pressure.     Pulmonic Valve  The pulmonic valve is normal. Trace pulmonic insufficiency is present.     Vessels  The thoracic aorta is normal. The pulmonary artery and bifurcation cannot be  assessed. IVC diameter and respiratory changes fall into an intermediate range  suggesting an RA pressure of 8 mmHg.     Pericardium  No pericardial effusion is present.     ______________________________________________________________________________  MMode/2D Measurements & Calculations  IVSd: 1.7 cm  LVIDd: 5.3 cm  LVIDs: 4.4 cm  LVPWd: 1.5 cm     FS: 17.7 %  LV mass(C)d: 379.4 grams  LV mass(C)dI: 147.2 grams/m2  Ao root diam: 4.0 cm  asc Aorta Diam: 3.6 cm  LVOT diam: 2.4 cm  LVOT area: 4.6 cm2  LA Volume (BP): 129.0 ml  LA Volume Index (BP): 50.0 ml/m2  RWT: 0.55  TAPSE: 1.8 cm     Doppler Measurements & Calculations  MV E max connor: 78.8 cm/sec  MV A max connor: 53.1 cm/sec  MV E/A: 1.5  MV dec  slope: 631.0 cm/sec2  MV dec time: 0.13 sec  PA acc time: 0.14 sec  TR max omar: 312.7 cm/sec  TR max P.1 mmHg  E/E' av.8  Lateral E/e': 10.1  Medial E/e': 13.6  RV S Omar: 12.2 cm/sec     ______________________________________________________________________________  Report approved by: Kailee Fine 2023 12:25 PM  Stress Test: Date: Results:    ECG Reviewed: Date: Results:    Cath: Date: Results:      METS/Exercise Tolerance:     Hematologic:       Musculoskeletal:       GI/Hepatic:     (+) GERD,     Renal/Genitourinary:     (+) renal disease,     Endo:     (+) Obesity,     Psychiatric/Substance Use:       Infectious Disease:       Malignancy:       Other:               OUTSIDE LABS:  CBC:   Lab Results   Component Value Date    WBC 9.1 2023    WBC 10.7 2023    HGB 11.4 (L) 2023    HGB 11.3 (L) 2023    HCT 38.2 (L) 2023    HCT 38.1 (L) 2023     2023     2023     BMP:   Lab Results   Component Value Date     2023     2023    POTASSIUM 4.9 2023    POTASSIUM 3.9 2023    CHLORIDE 98 2023    CHLORIDE 94 (L) 2023    CO2 27 2023    CO2 28 2023    BUN 21.6 (H) 2023    BUN 21.5 (H) 2023    CR 1.41 (H) 2023    CR 1.48 (H) 2023     (H) 2023     (H) 2023     COAGS:   Lab Results   Component Value Date    PTT 33 2023    INR 1.03 2023     POC: No results found for: BGM, HCG, HCGS  HEPATIC:   Lab Results   Component Value Date    ALBUMIN 3.7 2023    PROTTOTAL 6.8 2023    ALT 25 2023    AST 43 2023    ALKPHOS 92 2023    BILITOTAL 1.1 2023     OTHER:   Lab Results   Component Value Date    LACT 1.2 2023    A1C 6.7 (H) 2023    EFREM 9.4 2023    PHOS 3.6 2023    MAG 2.0 2023    TSH 1.71 2022    CRP 3.6 2016    SED 13 2016       Anesthesia Plan    ASA  Status:  3    NPO Status:  NPO Appropriate    Anesthesia Type: General.     - Airway: ETT   Induction: Intravenous.   Maintenance: Balanced.        Consents    Anesthesia Plan(s) and associated risks, benefits, and realistic alternatives discussed. Questions answered and patient/representative(s) expressed understanding.     - Discussed:     - Discussed with:  Patient       - Patient is DNR/DNI Status: No          Postoperative Care    Pain management: Multi-modal analgesia.   PONV prophylaxis: Ondansetron (or other 5HT-3), Dexamethasone or Solumedrol     Comments:                Juan Denson MD

## 2023-04-25 VITALS
TEMPERATURE: 97.6 F | HEIGHT: 74 IN | RESPIRATION RATE: 20 BRPM | DIASTOLIC BLOOD PRESSURE: 108 MMHG | BODY MASS INDEX: 37.67 KG/M2 | HEART RATE: 85 BPM | WEIGHT: 293.5 LBS | OXYGEN SATURATION: 99 % | SYSTOLIC BLOOD PRESSURE: 132 MMHG

## 2023-04-25 DIAGNOSIS — I72.2 RENAL ARTERY PSEUDOANEURYSM (H): Primary | ICD-10-CM

## 2023-04-25 LAB
ANION GAP SERPL CALCULATED.3IONS-SCNC: 12 MMOL/L (ref 7–15)
BASOPHILS # BLD AUTO: 0 10E3/UL (ref 0–0.2)
BASOPHILS NFR BLD AUTO: 1 %
BUN SERPL-MCNC: 24.2 MG/DL (ref 6–20)
CALCIUM SERPL-MCNC: 9 MG/DL (ref 8.6–10)
CHLORIDE SERPL-SCNC: 96 MMOL/L (ref 98–107)
CREAT SERPL-MCNC: 1.55 MG/DL (ref 0.67–1.17)
DEPRECATED HCO3 PLAS-SCNC: 29 MMOL/L (ref 22–29)
EOSINOPHIL # BLD AUTO: 0.2 10E3/UL (ref 0–0.7)
EOSINOPHIL NFR BLD AUTO: 2 %
ERYTHROCYTE [DISTWIDTH] IN BLOOD BY AUTOMATED COUNT: 15.8 % (ref 10–15)
GFR SERPL CREATININE-BSD FRML MDRD: 61 ML/MIN/1.73M2
GLUCOSE SERPL-MCNC: 91 MG/DL (ref 70–99)
HCT VFR BLD AUTO: 36.9 % (ref 40–53)
HGB BLD-MCNC: 11 G/DL (ref 13.3–17.7)
IMM GRANULOCYTES # BLD: 0 10E3/UL
IMM GRANULOCYTES NFR BLD: 1 %
LYMPHOCYTES # BLD AUTO: 1 10E3/UL (ref 0.8–5.3)
LYMPHOCYTES NFR BLD AUTO: 13 %
MAGNESIUM SERPL-MCNC: 1.9 MG/DL (ref 1.7–2.3)
MCH RBC QN AUTO: 24.7 PG (ref 26.5–33)
MCHC RBC AUTO-ENTMCNC: 29.8 G/DL (ref 31.5–36.5)
MCV RBC AUTO: 83 FL (ref 78–100)
MONOCYTES # BLD AUTO: 1 10E3/UL (ref 0–1.3)
MONOCYTES NFR BLD AUTO: 13 %
NEUTROPHILS # BLD AUTO: 5.6 10E3/UL (ref 1.6–8.3)
NEUTROPHILS NFR BLD AUTO: 70 %
NRBC # BLD AUTO: 0 10E3/UL
NRBC BLD AUTO-RTO: 0 /100
PHOSPHATE SERPL-MCNC: 4.7 MG/DL (ref 2.5–4.5)
PLATELET # BLD AUTO: 307 10E3/UL (ref 150–450)
POTASSIUM SERPL-SCNC: 4.4 MMOL/L (ref 3.4–5.3)
RBC # BLD AUTO: 4.45 10E6/UL (ref 4.4–5.9)
SODIUM SERPL-SCNC: 137 MMOL/L (ref 136–145)
WBC # BLD AUTO: 7.9 10E3/UL (ref 4–11)

## 2023-04-25 PROCEDURE — 999N000157 HC STATISTIC RCP TIME EA 10 MIN

## 2023-04-25 PROCEDURE — 99238 HOSP IP/OBS DSCHRG MGMT 30/<: CPT | Performed by: STUDENT IN AN ORGANIZED HEALTH CARE EDUCATION/TRAINING PROGRAM

## 2023-04-25 PROCEDURE — 83735 ASSAY OF MAGNESIUM: CPT

## 2023-04-25 PROCEDURE — 250N000013 HC RX MED GY IP 250 OP 250 PS 637: Performed by: STUDENT IN AN ORGANIZED HEALTH CARE EDUCATION/TRAINING PROGRAM

## 2023-04-25 PROCEDURE — 94660 CPAP INITIATION&MGMT: CPT

## 2023-04-25 PROCEDURE — 250N000013 HC RX MED GY IP 250 OP 250 PS 637

## 2023-04-25 PROCEDURE — 80048 BASIC METABOLIC PNL TOTAL CA: CPT

## 2023-04-25 PROCEDURE — 36415 COLL VENOUS BLD VENIPUNCTURE: CPT

## 2023-04-25 PROCEDURE — 85025 COMPLETE CBC W/AUTO DIFF WBC: CPT

## 2023-04-25 PROCEDURE — 84100 ASSAY OF PHOSPHORUS: CPT

## 2023-04-25 RX ORDER — OXYCODONE HYDROCHLORIDE 5 MG/1
5 TABLET ORAL EVERY 4 HOURS PRN
Qty: 9 TABLET | Refills: 0 | Status: SHIPPED | OUTPATIENT
Start: 2023-04-25 | End: 2023-04-28

## 2023-04-25 RX ADMIN — OXYCODONE HYDROCHLORIDE 5 MG: 5 TABLET ORAL at 08:17

## 2023-04-25 RX ADMIN — ACETAMINOPHEN 650 MG: 325 TABLET, FILM COATED ORAL at 00:05

## 2023-04-25 RX ADMIN — OXYCODONE HYDROCHLORIDE 5 MG: 5 TABLET ORAL at 04:18

## 2023-04-25 RX ADMIN — CARVEDILOL 25 MG: 25 TABLET, FILM COATED ORAL at 08:06

## 2023-04-25 RX ADMIN — OXYCODONE HYDROCHLORIDE 5 MG: 5 TABLET ORAL at 00:05

## 2023-04-25 RX ADMIN — ACETAMINOPHEN 650 MG: 325 TABLET, FILM COATED ORAL at 08:06

## 2023-04-25 RX ADMIN — LISINOPRIL 40 MG: 20 TABLET ORAL at 08:05

## 2023-04-25 RX ADMIN — ACETAMINOPHEN 650 MG: 325 TABLET, FILM COATED ORAL at 04:18

## 2023-04-25 RX ADMIN — TORSEMIDE 20 MG: 20 TABLET ORAL at 08:06

## 2023-04-25 RX ADMIN — SPIRONOLACTONE 100 MG: 25 TABLET, FILM COATED ORAL at 08:09

## 2023-04-25 ASSESSMENT — ACTIVITIES OF DAILY LIVING (ADL)
ADLS_ACUITY_SCORE: 18

## 2023-04-25 NOTE — PLAN OF CARE
Goal Outcome Evaluation:      Plan of Care Reviewed With: patient, parent    Overall Patient Progress: improvingOverall Patient Progress: improving     Transfer  Transferred from: PACU  Via:cart  Reason for transfer: Post-Op   Family: Aware of transfer, mom at bedside  Belongings: Received with pt. Teeth given to mom per pt.   Chart: Received with pt  Medications: Meds received from old unit with pt  Code Status verified on armband: yes  2 RN Skin Assessment Completed By: True Cosme   Med rec completed: yes  Bed surface reassessed with algorithm and charted: yes  New bed surface ordered: no  Suction/Ambu bag/Flowmeter at bedside: yes    Report received from: PACU  Pt status: Alert and oriented. Denies pain. Groin site soft and c/d/I. Pulses 2+ palpable.       B/P: 132/91, T: 98.4, P: 94, R: 20  Neuro: A&Ox4.   Cardiac: SR. VSS.    Respiratory: Sating 96% on RA.   GI/: due to void. Last BM yesteday.   Diet/appetite: Regular diet.   Activity:  Bedrest until 2030, keep leg straight.   Pain: Denies.   Skin: No new deficits noted.  LDA's: PIV x2 SL    Plan: Pt states after bedrest he will be going home. MD notified. Mom at bedside and updated on POC. Notify primary team with changes.

## 2023-04-25 NOTE — PROGRESS NOTES
"DISCHARGE                         4/25/2023  9:40 AM  ----------------------------------------------------------------------------  Discharged to: Home  Via: private transportation  Accompanied by: Family  Discharge Instructions: regular diet,  Independent activity, medications, follow up appointments, when to call the MD, aftercare instructions.  Prescriptions: To be filled by  Patient home  pharmacy; medication list reviewed & sent with pt  Follow Up Appointments: arranged; information given  Belongings: All sent with pt  IV: d/c'd  Telemetry: d/c'd  Pt exhibits understanding of above discharge instructions; all questions answered.      @0730 am patient wanted to leave unit AMA. Stated his ride was \"On its way\". Advice patient to stay until MD discharge him officially.  Signed AMA papers initially but agreed with nurse to stay until MD discharge   @0930 all IVs, tele removed from patient. Discharge instruction explained.     Discharge Paperwork: Signed, copied, and sent home with patient.   "

## 2023-04-25 NOTE — PLAN OF CARE
Goal Outcome Evaluation:    Neuro: A&Ox4.   Cardiac: SR. VSS.   Respiratory: Sating high on 2LNC. RA while awake. Trialed CPAP, but didn't tolerate through thenight.   GI/: Adequate urine output. BM X1  Diet/appetite: Tolerating Regular diet. Eating well.  Activity:  Assist of SBA, up to chair and in halls.  Pain: At acceptable level on current regimen.   Skin: No new deficits noted. R groin C/D/I/soft.  LDA's:PIV x2.     Plan: Continue with POC. Notify primary team with changes.       Problem: Plan of Care - These are the overarching goals to be used throughout the patient stay.    Goal: Plan of Care Review  Description: The Plan of Care Review/Shift note should be completed every shift.  The Outcome Evaluation is a brief statement about your assessment that the patient is improving, declining, or no change.  This information will be displayed automatically on your shift note.  Outcome: Progressing

## 2023-04-26 ENCOUNTER — PATIENT OUTREACH (OUTPATIENT)
Dept: CARE COORDINATION | Facility: CLINIC | Age: 33
End: 2023-04-26
Payer: COMMERCIAL

## 2023-04-26 NOTE — DISCHARGE SUMMARY
[facility]    Internal Medicine Discharge Summary- [facility]    Date of Admission: 4/18/2023  Date of Discharge: 4/25/2023  Discharging Attending Provider: Dr. 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 (4/18, repeat 4/24)  Hypertension  Suspected hyperaldosteronism  Hypertensive emergency; resolved  Demand ischemia 2/2 above; resolved  HFrEF (EF 30-35%); not in acute exacerbation  CKD  Atrial fibrillation  Demand ischemia in setting of hypertensive emergency; downtrended    Follow-ups Needed After Discharge    - Cardiology: HFrEF GDMT; cardiomyopathy workup  - Nephrology: HTN management; consider uptitration of spironolactone  - Urology: f/u repeat CT for monitoring left renal hematoma  - PCP: sleep study given resistant HTN; ensure follow up w/ specialties above    Hospital Course   Miguel Ángel Wilson is a 32 year old male admitted on 4/18/2023 who is being transferred out of the ICU on 4/20/2023. He has a history of hypertension, atrial fibrillation, CKD, HFrEF who was admitted with left flank pain found to have subcapsular hematoma s/p IR embolization initially admitted to ICU for close hemodynamic monitoring and hypertension requiring nicardipine drip. Transferred out of ICU on 4/21. Left renal artery pseudoaneurysm continued to fill despite prior embolization, so repeat coil embolization was performed successfully on 4/24. Hospital course complicated by ongoing hypertension.    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 No trauma or inciting event for the hematoma, though patient is on anticoagulation. Underwent IR embolization on 4/18 (unable to identify  discrete vessel of extravasation, superior pole segmental vessel embolized, persistent pseudoaneurysm). Repeat CTA on 4/19 demonstrated postprocedural changes of superior L renal artery embolization w/ persistent faint filling of pseudoaneurysm and resolution of area of contrast extravasation.   - Urology consulted; recs appreciated              - No surgical intervention              - Arranging outpatient f/u in 6 weeks w/ CT prior  - IR consulted; recs appreciated              - s/p attempted embolization on 4/18, however persistently filling left renal artery pseudoaneurysm   - s/p successful repeat coil embolization on 4/24      Hypertension  Suspected hyperaldosteronism  Hypertensive emergency; resolved  Demand ischemia 2/2 above; resolved  Patient with initial BP of 200/154 on admission w/ troponin 26 >>> 87 > 84. EKG without concern for ACS. Patient ran out of lisinopril PTA. Prior hyperaldo workup (11/2021, although not AM sample): PAC 11.5, PRA 0.4, K 3.4.  - Nephrology consulted; recs appreciated               - Goal SBP > 120 (given longstanding HTN)  - s/p nicardipine gtt (4/18-4/19)  - Continue PTA lisinopril 40mg daily  - Start carvedilol 25mg BID (instead of PTA metoprolol succinate 25mg BID)  - Started spironolactone 100mg daily  - Trialed amlodipine; discontinued d/t hx headache  - Sleep study as outpatient    HFrEF (EF 30-35%); not in acute exacerbation  Patient initially diagnosed with HFrEF in November of 2021 after presenting to [facility] with dyspnea and lower extremity edema. TTE showed LVEF of 45% and LVH, discharged on lisinopril and carvedilol at that time. Pt. Presented to [facility] cardiology in January of 2022 with ongoing dyspnea and worsening LE edema. pBNP was 6,200 and troponin 141 --> 104 at that time. He underwent aggressive diuresis with bumex gtt with improvement of edema. Patient's carvedilol was replaced with metoprolol at that time and he was started on torsemide and apixaban in  addition to his prior lisinopril. Now on this admission, patient's BNP found to be 2,256 and troponin of 26. EKG showed sinus rhythm with LV hypertrophy and QT prolongation. EF was 30-35% on 01/21/22. Patient was given a dose of 60mg IV lasix on admission. CXR without significant edema and patient without edema on exam.   - Cardiology signed off; recs appreciated              - Needs outpatient follow up for further cardiomyopathy workup (eg, cMRI)  - GDMT: continue PTA lisinopril; started spironolactone; carvedilol instead of PTA metoprolol succinate  - Continue PTA torsemide 20mg BID     CKD  Unclear etiology of CKD, possibly in the setting of uncontrolled hypertension. Baseline creatinine appears to be 1.6-1.7 though patient has not had close follow up.  - Nephrology following; recs appreciated    Atrial fibrillation  CHADSVASC 2.  - Start carvedilol 25mg BID (instead of PTA metoprolol succinate 25mg BID) for better BP control   - Resumed PTA apixaban on discharge given successful repeat coil embolization and stable Hgb (also discussed w/ urology)     Demand ischemia in setting of hypertensive emergency; downtrended  Patient with reproducible chest pain on admission.        Consultations This Hospital Stay   Interventional Radiology  Nephrology  Cardiology    Code Status   Full Code       The patient was discussed with Dr. Padron.    Nelda Martinez MD  ______________________________________________________________________    Physical Exam   Vital Signs: Temp: 98.2  F (36.8  C) Temp src: Oral BP: (!) 139/106 Pulse: 81   Resp: 16 SpO2: 99 % O2 Device: Nasal cannula Oxygen Delivery: 2 LPM  Weight: 293 lbs 8 oz  Per Dr. Shane Higgins:  Constitutional: NAD. Conversant. Generally well-appearing.   HEENT: NC/AT, EOMI.  CV: RRR, normal S1/S2, no murmurs/gallops/rubs.  Pulm: Non-labored respirations on room air, CTAB, no wheezes or crackles.  Abdomen: Normoactive bowel sounds, soft, non-distended, non-tender. No CVA  tenderness.  Extremities: Warm and well-perfused, no peripheral edema.  Skin: No jaundice, no rashes on exposed skin.  Neuro: Alert and oriented x3, moves all four extremities independently.  Psych: Normal affect.    Significant Results and Procedures    IR RENAL ANGIOGRAM LEFT (4/24)  IMPRESSION:  1. Technically successful repeat coil embolization of the persistently  filling left upper/mid pole segmental renal artery pseudoaneurysm.  2. Right common femoral closure with 6 Greek Angio-Seal.    CTA AP (4/19)                                                          IMPRESSION:  1. Postprocedural changes of superior left renal artery embolization.  There is persistent faint filling of a pseudoaneurysm measuring  approximately 17 mm in the superior pole, medially.  2. Resolution of the previously seen additional area of contrast  extravasation adjacent to the inferolateral portion of the left  kidney.  3. Stable size of a subcapsular hematoma with small amount of blood  products in the perirenal space extending along the right paracolic  gutter into the pelvis.   4. Wedge-shaped filling defects/infarcts in the left kidney,  compatible with small infarcts  5. Stable indeterminate 1.8 cm right renal lesion. As previously  suggested, further evaluation with nonurgent renal ultrasound and/or  MRI is recommended.    IR RENAL ANGIOGRAM LEFT (4/19)  IMPRESSION:  Renal angiogram demonstrates upper/mid pole pseudoaneurysm. Segmental  artery supplying the PSA could not be definitively identified. Small  subsegmental artery perfusing the region was coiled successfully. If  plan for repeat angiogram in the future, PSA supply is likely from  proximal subsegmental upper pole branch demonstrated on  intra-procedure CTA.     Pending Results   None       Primary Care Physician   Physician No Ref-Primary    Discharge Disposition   Discharged to home  Condition at discharge: Stable    Discharge Orders    CT Abdomen pelvis w & w/o  contrast          Primary Care Referral       Adult Nephrology  Referral       Reason for your hospital stay     Dear Miguel Ángel Wilson,     Your were hospitalized at [facility] with kidney hematoma and pseudoaneurysm, as well as very high blood pressure.  Over your hospitalization you were treated with embolization (performed by interventional radiology) and blood pressure medications are now ready to be discharged.       If you continue your medications you should continue to improve. However, if you develop nausea, vomiting, fever, chills, new back pain please seek medical attention.     We are suggesting the following medication changes:  - See your after visit summary for a list of updated medications     Please attend the following appointments after discharge:  - Primary care doctor in 1-2 weeks for post-hospitalization follow up (referral placed)  - Nephrology (kidney doctor) for blood pressure management and further testing to determine why your blood pressure has been very high (referral placed)     It was a pleasure meeting with you today. Thank you for allowing me and my team the privilege of caring for you today. You are the reason we are here, and I truly hope we provided you with the excellent service you deserve. Please let us know if there is anything else we can do for you so that we can be sure you are leaving completely satisfied with your care experience.     Your hospital unit at the time of discharge is 6B so if you have any questions please call the hospital at [phone] and ask to talk to a nurse on that unit.     Thank you!          Activity     Your activity upon discharge: activity as tolerated          Follow Up ([facility])     Follow up with primary care provider, Physician No Ref-Primary, within 7 days for hospital follow- up.  No follow up labs or test are needed.     Follow up with nephrology (kidney doctor) for blood pressure management (referral  placed).       Appointments on [place] and/or [facility] (with [facility] or [facility] provider or service). Call [phone] if you haven't heard regarding these appointments within 7 days of discharge.          Diet     Follow this diet upon discharge: Orders Placed This Encounter      NPO per Anesthesia Guidelines for Procedure/Surgery Except for: Meds        Discharge Medications   Patient's Medications   New Prescriptions    CARVEDILOL (COREG) 25 MG TABLET    Take 1 tablet (25 mg) by mouth 2 times daily for 360 days    OXYCODONE (ROXICODONE) 5 MG TABLET    1 tablet (5 mg) by Oral or Feeding Tube route every 4 hours as needed for severe pain Max three doses daily    POLYETHYLENE GLYCOL (MIRALAX) 17 GM/DOSE POWDER    Take 17 g by mouth daily    SPIRONOLACTONE (ALDACTONE) 100 MG TABLET    Take 1 tablet (100 mg) by mouth daily   Previous Medications    No medications on file   Modified Medications    Modified Medication Previous Medication    LISINOPRIL (ZESTRIL) 40 MG TABLET lisinopril (ZESTRIL) 40 MG tablet       Take 1 tablet (40 mg) by mouth daily    Take 1 tablet (40 mg) by mouth daily    TORSEMIDE (DEMADEX) 20 MG TABLET torsemide (DEMADEX) 20 MG tablet       Take 1 tablet (20 mg) by mouth 2 times daily    Take 1 tablet (20 mg) by mouth 2 times daily   Discontinued Medications    ACETAMINOPHEN (TYLENOL) 500 MG TABLET    Take 500-1,000 mg by mouth every 6 hours as needed for mild pain    AMLODIPINE (NORVASC) 10 MG TABLET    Take 1 tablet (10 mg) by mouth daily for 360 days    APIXABAN ANTICOAGULANT (ELIQUIS) 5 MG TABLET    Take 1 tablet (5 mg) by mouth 2 times daily    GABAPENTIN (NEURONTIN) 300 MG CAPSULE    Take 1 capsule (300 mg) by mouth 3 times daily    LISINOPRIL (ZESTRIL) 40 MG TABLET    Take 1 tablet (40 mg) by mouth daily    METOPROLOL SUCCINATE ER (TOPROL-XL) 25 MG 24 HR TABLET    Take 1 tablet (25 mg) by mouth daily    SPIRONOLACTONE (ALDACTONE) 25 MG TABLET    Take 0.5 tablets (12.5 mg) by mouth daily     SPIRONOLACTONE (ALDACTONE) 25 MG TABLET    Take 1 tablet (25 mg) by mouth daily for 360 days     Allergies   No Known Allergies

## 2023-04-26 NOTE — PROGRESS NOTES
"Clinic Care Coordination Contact  [facility]: Post-Discharge Note  SITUATION                                                      Admission:    Admission Date: 04/18/23   Reason for Admission: Flank pain  Discharge:   Discharge Date: 04/25/23  Discharge Diagnosis: Anticoagulated, Hypertensive emergency, Subcapsular hematoma of kidney transplant    BACKGROUND                                                      Per hospital discharge summary and inpatient provider notes:    Miguel Ángel Wilson is a 32 year old male who has a quite complex cardiovascular history significant for longstanding hypertension, atrial fibrillation, chronic kidney disease, congestive heart failure.  He arrives today to the emergency department for evaluation of left flank pain x1 day.  Patient reports onset of symptoms last night.  This was initially mild however today significant worsened.  The patient reports worsening of symptoms when walking upstairs.  The patient got to the top of 10 stairs and nearly collapsed per patient.  He states he was sweaty and developed left flank pain rated at moderate to severe in intensity.  He did apply external cream and take ibuprofen without relief.  Patient reports no similar symptoms in the past.  He reports no trauma.  He denies chest pain or shortness of breath.  Patient reports no nausea, vomiting.  He did attempt to defecate and reports he was constipated.  He denies history of nephrolithiasis, pyelonephritis, dysuria, or frequency or hematuria.     ASSESSMENT      Discharge Assessment  How are you doing now that you are home?: \"Good, everything is going well.\"  How are your symptoms? (Red Flag symptoms escalate to triage hotline per guidelines): Improved  Do you feel your condition is stable enough to be safe at home until your provider visit?: Yes  Does the patient have their discharge instructions? : Yes  Does the patient have questions regarding their discharge instructions? : No  Were you " started on any new medications or were there changes to any of your previous medications? : Yes  Does the patient have all of their medications?: Yes  Do you have questions regarding any of your medications? : No  Do you have all of your needed medical supplies or equipment (DME)?  (i.e. oxygen tank, CPAP, cane, etc.): Yes  Discharge follow-up appointment scheduled within 14 calendar days? : Yes  Discharge Follow Up Appointment Date: 05/11/23  Discharge Follow Up Appointment Scheduled with?: Primary Care Provider (PCP appt. 5/11, Nephrology appt. 5/26, Urology appt. 6/30.)    Post-op ([facility] CTA Only)  If the patient had a surgery or procedure, do they have any questions for a nurse?: No    PLAN                                                      Outpatient Plan:      Please attend the following appointments after discharge:    - Primary care doctor in 1-2 weeks for post-hospitalization  follow up (referral placed)    - Nephrology (kidney doctor) for blood pressure  management and further testing to determine why your  blood pressure has been very high (referral placed)    - Urology (the surgeons who work on the urinary tract) on  06/30/2023    - Interventional Radiology : they will call you and will have a  CAT scan done as well to look at your kidneys and their  blood vessels    Future Appointments   Date Time Provider Department Center   5/11/2023  9:00 AM Shane Ross MD [facility]   5/26/2023 10:00 AM [facility]   5/26/2023 11:00 AM Shanon Valentino NP [facility]   6/30/2023  9:20 AM Christ Cordova MD [facility]         For any urgent concerns, please contact our 24 hour nurse triage line: [phone] ([phone])         IRMA Sánchez  [phone]  [facility]

## 2023-04-27 ENCOUNTER — CARE COORDINATION (OUTPATIENT)
Dept: CARDIOLOGY | Facility: CLINIC | Age: 33
End: 2023-04-27
Payer: COMMERCIAL

## 2023-04-27 NOTE — PROGRESS NOTES
Referral received from inpatient service for patient with EF noted to be 30-35%.  Pt is asymptomatic at present.    4/28/23 - Called and LVM  5/10/23m - Called and spoke with patient. Offered appt and pt confirmed he is able to come.

## 2023-04-29 ENCOUNTER — TELEPHONE (OUTPATIENT)
Dept: SURGERY | Facility: CLINIC | Age: 33
End: 2023-04-29
Payer: COMMERCIAL

## 2023-04-29 NOTE — TELEPHONE ENCOUNTER
----- Message from Elda Causey RN sent at 4/27/2023 12:39 PM CDT -----  Regarding: FW: Follow up for [facility] patient  Baudilio Aguiar,     Can you please call this patient and help him schedule the ordered Dr. Camacho CTA needed in one month and then will you please also help him schedule a video visit with Dr. Strogn also due in one month for left renal embolization follow up. Thank you!     Elda   ----- Message -----  From: Mike Camacho DO  Sent: 4/25/2023   9:22 AM CDT  To: Elda Causey RN  [facility]                  Mr. Wilson needs a one month clinic follow up with Dr. HERR in accordance with a CTA (that I've ordered) to review the results of the left renal pseudoaneurysm we embolized. Can you help with arranging that? Thanks,    Emanuel

## 2023-05-08 NOTE — PROGRESS NOTES
My attestation to Dr Galvan's note has the incorrect date.  It should be 4/18/23 instead of 9/25/23.

## 2023-05-11 ENCOUNTER — VIRTUAL VISIT (OUTPATIENT)
Dept: FAMILY MEDICINE | Facility: CLINIC | Age: 33
End: 2023-05-11
Payer: COMMERCIAL

## 2023-05-11 DIAGNOSIS — Z53.9 ERRONEOUS ENCOUNTER--DISREGARD: Primary | ICD-10-CM

## 2023-05-11 NOTE — PROGRESS NOTES
Called at 9.01am, voicemail full  Called at 9.10am, answered, disconnected  Called at 9.15am, voicemail full

## 2023-05-19 ENCOUNTER — TELEPHONE (OUTPATIENT)
Dept: NEPHROLOGY | Facility: CLINIC | Age: 33
End: 2023-05-19
Payer: COMMERCIAL

## 2023-05-19 NOTE — TELEPHONE ENCOUNTER
Called patient with a appointment reminder for Fri. 5/26/23 @ 11:00 am with Beatrice Valentino and a lab draw @ 10:00 am    Ari Sow on 5/19/2023 at 9:14 AM

## 2023-05-23 ENCOUNTER — DOCUMENTATION ONLY (OUTPATIENT)
Dept: LAB | Facility: CLINIC | Age: 33
End: 2023-05-23

## 2023-05-23 DIAGNOSIS — S37.019A SUBCAPSULAR HEMATOMA OF KIDNEY TRANSPLANT: Primary | ICD-10-CM

## 2023-05-23 DIAGNOSIS — T86.19 SUBCAPSULAR HEMATOMA OF KIDNEY TRANSPLANT: Primary | ICD-10-CM

## 2023-05-25 DIAGNOSIS — I10 ESSENTIAL HYPERTENSION: Primary | ICD-10-CM

## 2023-05-31 ENCOUNTER — TELEPHONE (OUTPATIENT)
Dept: NEPHROLOGY | Facility: CLINIC | Age: 33
End: 2023-05-31
Payer: COMMERCIAL

## 2023-05-31 NOTE — TELEPHONE ENCOUNTER
Follow up appt scheduled for 6/1 @ 8a virtual, pt in agreement to get labs done by the end of the day today (5/31). Lab orders needed.    Kateryna Lopez    Nephrology Clinic Navigator.

## 2023-06-01 ENCOUNTER — VIRTUAL VISIT (OUTPATIENT)
Dept: NEPHROLOGY | Facility: CLINIC | Age: 33
End: 2023-06-01
Payer: COMMERCIAL

## 2023-06-01 DIAGNOSIS — N18.31 STAGE 3A CHRONIC KIDNEY DISEASE (H): Primary | ICD-10-CM

## 2023-06-01 DIAGNOSIS — I50.20 HEART FAILURE WITH REDUCED EJECTION FRACTION, NYHA CLASS I (H): ICD-10-CM

## 2023-06-01 PROCEDURE — 99214 OFFICE O/P EST MOD 30 MIN: CPT | Mod: VID

## 2023-06-01 RX ORDER — LISINOPRIL 40 MG/1
40 TABLET ORAL DAILY
Qty: 90 TABLET | Refills: 3 | Status: SHIPPED | OUTPATIENT
Start: 2023-06-01 | End: 2024-07-29

## 2023-06-01 ASSESSMENT — PAIN SCALES - GENERAL: PAINLEVEL: NO PAIN (0)

## 2023-06-01 NOTE — PROGRESS NOTES
"Nephrology Video Visit 6/1/23    Assessment and Plan:    1. 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 Upon discharge was 1.5 which is his baseline. Peak creat during admission was 1.65   My concern is that he was discharged on Spironolactone 100 mg every day and Lisinopril 40 mg every day ( has not been taking the Lisinopril), so labs are necessary to evaluate for hyperkalemia/bump in creat.    - Patient has long standing CKD likely multifactorial: HTN, Obesity, CRS   - Baseline creat mid 1's since at least 2021   - Discussed importance of maximizing his b/p control/HF in effort to maintain his renal function   - Will reschedule appt for next week w/labs/b/ps    2. Volume - Denies edema/dyspnea. Feels \"full of energy\". No b/ps, weights. Discharge weight was ~ 293#  Currently on Torsemide 20 mg bid  Continue for now    3. CV/HTN/HFrEF/Afib on AC - Denies edema. No home b/ps. Was not taking Lisinopril. He may have hyperaldosteronism. Had renin 0.4/Juliano 11.5 ( 11/21)   - Rx for Lisinopril 40 mg every day sent to his Pharm   - Current regimen:     Coreg 25 mg bid    Spironolactone 100 mg every day    Lisinopril 40 mg every day ( wasn't taking. Will resume)    Torsemide 20 mg bid      - Asked patient to purchase a b/p device and begin home monitoring. Asked that he bring it with him to next visit      - Will schedule nurse visit to check his device and b/ps      - Not sure he ever had his sleep study. He was seen by sleep med for consult visit on 7/5/22    4. Electrolytes - No current labs: K was 4.4, Na 137 on 4/25/23    5. Acid base - No current labs. Bicarb 29 on 4/25/23    6. Anemia - Hgb 11.0 on 4/25/23 following coil embolization/subcapsular hematoma.     7. Disposition - RTC for in person visit on 7/10/23 with labs next week and prior to next visit     Assessment and plan was discussed with patient and he voiced his understanding and agreement.    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 Patient presented w/ L flank pain found to have subcapsular hematoma on CT. No trauma or inciting event for the hematoma, though patient is on anticoagulation. Admission b/p was 200/154. Had run out of some of his b/p meds. Was discharged on Spironolactone 100 mg every day, Lisinopril 40 mg every day, Torsemide 20 mg bid and Coreg 25 mg bid, Today patient reports that he never received Lisinopril from hospital discharge. He has no home blood pressures and has not had labs drawn since discharge.  Baseline creat 1.6-1.8 since 2021    ROS:   Patient denies complaints  No edema  Denies CP/dsypnea  Tries to follow a KEISHA diet  No GI concerns  No voiding issues. No hematuria  Energy level is good. 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     Personal Hx:   Works FT at [employer] 2pm to 10 pm    Allergies:  No Known Allergies    Medications:  Current Outpatient Medications   Medication Sig     apixaban ANTICOAGULANT (ELIQUIS) 5 MG tablet Take 1 tablet (5 mg) by mouth 2 times daily     carvedilol (COREG) 25 MG tablet Take 1 tablet (25 mg) by mouth 2 times daily for 360 days     lisinopril (ZESTRIL) 40 MG tablet Take 1 tablet (40 mg) by mouth daily     polyethylene glycol (MIRALAX) 17 GM/Dose powder Take 17 g by mouth daily     spironolactone (ALDACTONE) 100 MG tablet Take 1 tablet (100 mg) by mouth daily     torsemide (DEMADEX) 20 MG tablet Take 1 tablet (20 mg)  by mouth 2 times daily     No current facility-administered medications for this visit.      Vitals:  There were no vitals taken for this visit.    Exam:  GEN: Pleasant male in NAD  RESP: Breathing is non labored. Walking around while visiting w/o sign of TALBERT  ABDOMEN: Obese, tatooed  NEURO: A/O    LABS:   CMP  Recent Labs   Lab Test 04/25/23  0518 04/24/23  1758 04/24/23  0531 04/23/23  0613 04/22/23  0744 04/22/23  0559 01/21/22 2045 03/13/16  2144     --  137 136  --  135*   < > 140   POTASSIUM 4.4  --  4.9 3.9  --  4.2   < > 3.9   CHLORIDE 96*  --  98 94*  --  95*   < > 106   CO2 29  --  27 28  --  27   < > 27   ANIONGAP 12  --  12 14  --  13   < > 7   GLC 91 90 100* 101*   < > 126*   < > 88   BUN 24.2*  --  21.6* 21.5*  --  19.5   < > 10   CR 1.55*  --  1.41* 1.48*  --  1.57*   < > 0.94   GFRESTIMATED 61  --  68 64  --  60*   < > >90  Non  GFR Calc     GFRESTBLACK  --   --   --   --   --   --   --  >90  African American GFR Calc     EFREM 9.0  --  9.4 9.5  --  9.3   < > 8.5    < > = values in this interval not displayed.     Recent Labs   Lab Test 04/20/23  0534 04/18/23  1736 01/21/22 2045 03/13/16  2144   BILITOTAL 1.1 0.9 0.9 0.4   ALKPHOS 92 120 119 97   ALT 25 21 60 51   AST 43 22 35 20     CBC  Recent Labs   Lab Test 04/25/23  0518 04/24/23  0531 04/23/23  0613 04/22/23  0559   HGB 11.0* 11.4* 11.3* 11.5*   WBC 7.9 9.1 10.7 12.6*   RBC 4.45 4.62 4.63 4.65   HCT 36.9* 38.2* 38.1* 38.4*   MCV 83 83 82 83   MCH 24.7* 24.7* 24.4* 24.7*   MCHC 29.8* 29.8* 29.7* 29.9*   RDW 15.8* 15.9* 15.8* 15.9*    320 284 264     URINE STUDIES  Recent Labs   Lab Test 04/18/23  1842   COLOR Light Yellow   APPEARANCE Clear   URINEGLC Negative   URINEBILI Negative   URINEKETONE Negative   SG 1.011   UBLD Negative   URINEPH 6.5   PROTEIN 20*   NITRITE Negative   LEUKEST Negative   RBCU 1   WBCU <1     No lab results found.  PTH  No lab results found.  IRON STUDIES  Recent Labs   Lab Test  04/19/23  0502 04/19/23  0436   IRON 57*  --      --    IRONSAT 17  --    HUSSEIN  --  57       Shanon Valentino, NP

## 2023-06-01 NOTE — NURSING NOTE
Is the patient currently in the state of [place]? YES    Visit mode:VIDEO    If the visit is dropped, the patient can be reconnected by: VIDEO VISIT: Text to cell phone: [phone]    Will anyone else be joining the visit? NO      How would you like to obtain your AVS? MyChart    Are changes needed to the allergy or medication list? NO    Reason for visit: RECHECK

## 2023-06-01 NOTE — LETTER
"6/1/2023       RE: Miguel Ángel Wilson  [address]  [place] [address]     Dear Colleague,    Thank you for referring your patient, Miguel Ángel Wilson, to the [facility]PH[facility] [place] at [facility]. Please see a copy of my visit note below.    Virtual Visit Details    Type of service:  Video Visit   Video Start Time: 0800  Video End Time:0820    Originating Location (pt. Location): home    Distant Location (provider location):  home  Platform used for Video Visit: bonita    Nephrology Video Visit 6/1/23    Assessment and Plan:    1. 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 Upon discharge was 1.5 which is his baseline. Peak creat during admission was 1.65   My concern is that he was discharged on Spironolactone 100 mg every day and Lisinopril 40 mg every day ( has not been taking the Lisinopril), so labs are necessary to evaluate for hyperkalemia/bump in creat.    - Patient has long standing CKD likely multifactorial: HTN, Obesity, CRS   - Baseline creat mid 1's since at least 2021   - Discussed importance of maximizing his b/p control/HF in effort to maintain his renal function   - Will reschedule appt for next week w/labs/b/ps    2. Volume - Denies edema/dyspnea. Feels \"full of energy\". No b/ps, weights. Discharge weight was ~ 293#  Currently on Torsemide 20 mg bid  Continue for now    3. CV/HTN/HFrEF/Afib on AC - Denies edema. No home b/ps. Was not taking Lisinopril. He may have hyperaldosteronism. Had renin 0.4/Juliano 11.5 ( 11/21)   - Rx for Lisinopril 40 mg every day sent to his Pharm   - Current regimen:     Coreg 25 mg bid    Spironolactone 100 mg every day    Lisinopril 40 mg every day ( wasn't taking. Will resume)    Torsemide 20 mg bid      - Asked patient to purchase a b/p device and begin home monitoring. Asked that he bring it with him to next visit      - Will schedule nurse visit to check his device and b/ps      - Not sure he ever had his " sleep study. He was seen by sleep med for consult visit on 7/5/22    4. Electrolytes - No current labs: K was 4.4, Na 137 on 4/25/23    5. Acid base - No current labs. Bicarb 29 on 4/25/23    6. Anemia - Hgb 11.0 on 4/25/23 following coil embolization/subcapsular hematoma.     7. Disposition - RTC for in person visit on 7/10/23 with labs next week and prior to next visit     Assessment and plan was discussed with patient and he voiced his understanding and agreement.    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 Patient presented w/ L flank pain found to have subcapsular hematoma on CT. No trauma or inciting event for the hematoma, though patient is on anticoagulation. Admission b/p was 200/154. Had run out of some of his b/p meds. Was discharged on Spironolactone 100 mg every day, Lisinopril 40 mg every day, Torsemide 20 mg bid and Coreg 25 mg bid, Today patient reports that he never received Lisinopril from hospital discharge. He has no home blood pressures and has not had labs drawn since discharge.  Baseline creat 1.6-1.8 since 2021    ROS:   Patient denies complaints  No edema  Denies CP/dsypnea  Tries to follow a KEISHA diet  No GI concerns  No voiding issues. No hematuria  Energy level is good. 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     Personal Hx:   Works FT at [employer] 2pm to 10 pm    Allergies:  No Known  Allergies    Medications:  Current Outpatient Medications   Medication Sig     apixaban ANTICOAGULANT (ELIQUIS) 5 MG tablet Take 1 tablet (5 mg) by mouth 2 times daily     carvedilol (COREG) 25 MG tablet Take 1 tablet (25 mg) by mouth 2 times daily for 360 days     lisinopril (ZESTRIL) 40 MG tablet Take 1 tablet (40 mg) by mouth daily     polyethylene glycol (MIRALAX) 17 GM/Dose powder Take 17 g by mouth daily     spironolactone (ALDACTONE) 100 MG tablet Take 1 tablet (100 mg) by mouth daily     torsemide (DEMADEX) 20 MG tablet Take 1 tablet (20 mg) by mouth 2 times daily     No current facility-administered medications for this visit.      Vitals:  There were no vitals taken for this visit.    Exam:  GEN: Pleasant male in NAD  RESP: Breathing is non labored. Walking around while visiting w/o sign of TALBERT  ABDOMEN: Obese, tatooed  NEURO: A/O    LABS:   CMP  Recent Labs   Lab Test 04/25/23  0518 04/24/23  1758 04/24/23  0531 04/23/23  0613 04/22/23  0744 04/22/23  0559 01/21/22 2045 03/13/16  2144     --  137 136  --  135*   < > 140   POTASSIUM 4.4  --  4.9 3.9  --  4.2   < > 3.9   CHLORIDE 96*  --  98 94*  --  95*   < > 106   CO2 29  --  27 28  --  27   < > 27   ANIONGAP 12  --  12 14  --  13   < > 7   GLC 91 90 100* 101*   < > 126*   < > 88   BUN 24.2*  --  21.6* 21.5*  --  19.5   < > 10   CR 1.55*  --  1.41* 1.48*  --  1.57*   < > 0.94   GFRESTIMATED 61  --  68 64  --  60*   < > >90  Non  GFR Calc     GFRESTBLACK  --   --   --   --   --   --   --  >90  African American GFR Calc     EFREM 9.0  --  9.4 9.5  --  9.3   < > 8.5    < > = values in this interval not displayed.     Recent Labs   Lab Test 04/20/23  0534 04/18/23  1736 01/21/22 2045 03/13/16  2144   BILITOTAL 1.1 0.9 0.9 0.4   ALKPHOS 92 120 119 97   ALT 25 21 60 51   AST 43 22 35 20     CBC  Recent Labs   Lab Test 04/25/23  0518 04/24/23  0531 04/23/23  0613 04/22/23  0559   HGB 11.0* 11.4* 11.3* 11.5*   WBC 7.9 9.1 10.7 12.6*   RBC  4.45 4.62 4.63 4.65   HCT 36.9* 38.2* 38.1* 38.4*   MCV 83 83 82 83   MCH 24.7* 24.7* 24.4* 24.7*   MCHC 29.8* 29.8* 29.7* 29.9*   RDW 15.8* 15.9* 15.8* 15.9*    320 284 264     URINE STUDIES  Recent Labs   Lab Test 04/18/23  1842   COLOR Light Yellow   APPEARANCE Clear   URINEGLC Negative   URINEBILI Negative   URINEKETONE Negative   SG 1.011   UBLD Negative   URINEPH 6.5   PROTEIN 20*   NITRITE Negative   LEUKEST Negative   RBCU 1   WBCU <1     No lab results found.  PTH  No lab results found.  IRON STUDIES  Recent Labs   Lab Test 04/19/23  0502 04/19/23  0436   IRON 57*  --      --    IRONSAT 17  --    HUSSEIN  --  57       Shanon Valentino NP        Again, thank you for allowing me to participate in the care of your patient.      Sincerely,    Shanon Valentino NP

## 2023-06-01 NOTE — PROGRESS NOTES
Virtual Visit Details    Type of service:  Video Visit   Video Start Time: 0800  Video End Time:0820    Originating Location (pt. Location): home    Distant Location (provider location):  home  Platform used for Video Visit: ThirdMotion

## 2023-06-02 ENCOUNTER — PRE VISIT (OUTPATIENT)
Dept: CARDIOLOGY | Facility: CLINIC | Age: 33
End: 2023-06-02
Payer: COMMERCIAL

## 2023-06-02 ENCOUNTER — TELEPHONE (OUTPATIENT)
Dept: NEPHROLOGY | Facility: CLINIC | Age: 33
End: 2023-06-02
Payer: COMMERCIAL

## 2023-06-02 DIAGNOSIS — I50.20 HEART FAILURE WITH REDUCED EJECTION FRACTION, NYHA CLASS I (H): Primary | ICD-10-CM

## 2023-06-02 NOTE — TELEPHONE ENCOUNTER
Attempted to schedule follow up appt for 7/10 as requested by provider, unable to reach pt at this time, lvm with writer direct line for call back.    Kateryna Lopez    Nephrology Clinic Navigator.          Follow up appt scheduled for 7/10 at 10:30 with labs at 9:30. Pt in agreement, lab orders needed.    Kateryna Lopez,  Nephrology Clinic Navigator  Clinics and [facility]  Direct: [phone].

## 2023-06-06 DIAGNOSIS — N18.31 STAGE 3A CHRONIC KIDNEY DISEASE (H): Primary | ICD-10-CM

## 2023-06-16 NOTE — TELEPHONE ENCOUNTER
MEDICAL RECORDS REQUEST   [facility]  Urology Clinic  [address] [place] [address]  [place] [address]  PHONE: [phone]  Fax: [phone]        FUTURE VISIT INFORMATION                                                     APPOINTMENT INFORMATION:    Date: 06/30/2023    Provider:  Christ Cordova MD    Reason for Visit/Diagnosis: KIDNEY MASS    RECORDS REQUESTED FOR VISIT                                                     NOTES  STATUS/DETAILS   OFFICE NOTE from other specialist  yes, 05/26/2023, 06/01/2023 -- Shanon Valentino NP   DISCHARGE REPORT from the ER  yes, 04/18/2023 - [facility]   MEDICATION LIST  yes   LABS     URINALYSIS (UA)  yes   IMAGES  yes, 04/24/2023, 04/18/2023 -- IR RENAL  04/19/2023, 04/18/2023 -- CT ABD PELVIS  04/19/2023 -- XR CHEST       PRE-VISIT CHECKLIST      Joint diagnostic appointment coordinated correctly          (ensure right order & amount of time) Yes   RECORD COLLECTION COMPLETE Yes

## 2023-06-19 ENCOUNTER — TELEPHONE (OUTPATIENT)
Dept: SURGERY | Facility: CLINIC | Age: 33
End: 2023-06-19
Payer: COMMERCIAL

## 2023-06-19 NOTE — TELEPHONE ENCOUNTER
Called and spoke to Miguel Ángel regarding his upcoming visit. He no showed his imaging appointment, will need to reschedule both the CTA and his visit with Dr. Strong. Writer stressed the importance of his follow up plan. Patient verbalized understanding. Message sent to clinic coordinators to help him reschedule his appointments.     Elda SHERMAN RN, BSN   Interventional Radiology [facility]   [phone]

## 2023-06-26 ENCOUNTER — TELEPHONE (OUTPATIENT)
Dept: SURGERY | Facility: CLINIC | Age: 33
End: 2023-06-26
Payer: COMMERCIAL

## 2023-06-26 NOTE — TELEPHONE ENCOUNTER
I called the pt the pt asked who is calling after announcing [facility] the phone hung up. After this happening a second time I left a Grot message for the pt to call and  reschedule the requested appointments.  Cosme Mendez on 6/26/2023 at 3:29 PM

## 2023-06-26 NOTE — TELEPHONE ENCOUNTER
----- Message from Elda Causey RN sent at 6/19/2023  1:29 PM CDT -----  Hi,     This patient no showed his CTA on 6/15 again :( can you please call him and reschedule the ordered Dr. Camacho CTA and help him reschedule a follow up video visit with Dr. Strong. Thank you!     Elda

## 2023-06-30 ENCOUNTER — PRE VISIT (OUTPATIENT)
Dept: UROLOGY | Facility: CLINIC | Age: 33
End: 2023-06-30

## 2023-07-03 NOTE — TELEPHONE ENCOUNTER
I have been unsuccessful in my efforts to contact the pt by phone and Mychart to schedule the requested appointments.  I am sending out a letter to the pt today.  Cosme Mendez on 7/3/2023 at 6:16 PM

## 2023-07-10 PROBLEM — E11.9 DIABETES MELLITUS, TYPE 2 (H): Status: ACTIVE | Noted: 2023-07-10

## 2023-07-29 ENCOUNTER — HEALTH MAINTENANCE LETTER (OUTPATIENT)
Age: 33
End: 2023-07-29

## 2023-12-16 ENCOUNTER — HEALTH MAINTENANCE LETTER (OUTPATIENT)
Age: 33
End: 2023-12-16

## 2024-02-15 NOTE — ANESTHESIA POSTPROCEDURE EVALUATION
Patient: Miguel Ángel Wilson    Procedure: Procedure(s):  ANESTHESIA OUT OF OR Left Renal Aniogram possible Embolization possible Direct Stick @1300       Anesthesia Type:  General    Note:  Disposition: Outpatient   Postop Pain Control: Uneventful            Sign Out: Well controlled pain   PONV: No   Neuro/Psych: Uneventful            Sign Out: Acceptable/Baseline neuro status   Airway/Respiratory: Uneventful            Sign Out: Acceptable/Baseline resp. status   CV/Hemodynamics: Uneventful            Sign Out: Acceptable CV status; No obvious hypovolemia; No obvious fluid overload   Other NRE: NONE   DID A NON-ROUTINE EVENT OCCUR? No           Last vitals:  Vitals Value Taken Time   /76 04/24/23 1830   Temp 36.7  C (98.1  F) 04/24/23 1815   Pulse 86 04/24/23 1830   Resp 18 04/24/23 1830   SpO2 94 % 04/24/23 1830       Electronically Signed By: Angie Michaels MD  February 15, 2024  3:53 PM   Patient

## 2024-04-19 ENCOUNTER — APPOINTMENT (OUTPATIENT)
Dept: GENERAL RADIOLOGY | Facility: CLINIC | Age: 34
End: 2024-04-19
Attending: EMERGENCY MEDICINE
Payer: COMMERCIAL

## 2024-04-19 ENCOUNTER — HOSPITAL ENCOUNTER (INPATIENT)
Facility: CLINIC | Age: 34
LOS: 2 days | Discharge: HOME | End: 2024-04-21
Attending: EMERGENCY MEDICINE | Admitting: INTERNAL MEDICINE
Payer: COMMERCIAL

## 2024-04-19 DIAGNOSIS — N18.9 CHRONIC KIDNEY DISEASE, UNSPECIFIED CKD STAGE: ICD-10-CM

## 2024-04-19 DIAGNOSIS — I50.20 HEART FAILURE WITH REDUCED EJECTION FRACTION, NYHA CLASS I (H): Primary | ICD-10-CM

## 2024-04-19 DIAGNOSIS — I13.0 HYPERTENSIVE HEART AND RENAL DISEASE WITH CONGESTIVE HEART FAILURE (H): ICD-10-CM

## 2024-04-19 DIAGNOSIS — I50.9 ACUTE ON CHRONIC CONGESTIVE HEART FAILURE, UNSPECIFIED HEART FAILURE TYPE (H): ICD-10-CM

## 2024-04-19 DIAGNOSIS — R09.89 CHEST RALES: ICD-10-CM

## 2024-04-19 LAB
ALBUMIN SERPL BCG-MCNC: 3.8 G/DL (ref 3.5–5.2)
ALBUMIN UR-MCNC: NEGATIVE MG/DL
ALP SERPL-CCNC: 90 U/L (ref 40–150)
ALT SERPL W P-5'-P-CCNC: 20 U/L (ref 0–70)
ANION GAP SERPL CALCULATED.3IONS-SCNC: 13 MMOL/L (ref 7–15)
APPEARANCE UR: CLEAR
AST SERPL W P-5'-P-CCNC: 31 U/L (ref 0–45)
ATRIAL RATE - MUSE: 95 BPM
BASOPHILS # BLD AUTO: 0 10E3/UL (ref 0–0.2)
BASOPHILS NFR BLD AUTO: 0 %
BILIRUB SERPL-MCNC: 2 MG/DL
BILIRUB UR QL STRIP: NEGATIVE
BUN SERPL-MCNC: 18.5 MG/DL (ref 6–20)
CALCIUM SERPL-MCNC: 8.7 MG/DL (ref 8.6–10)
CHLORIDE SERPL-SCNC: 96 MMOL/L (ref 98–107)
COLOR UR AUTO: ABNORMAL
CREAT SERPL-MCNC: 1.72 MG/DL (ref 0.67–1.17)
CREAT SERPL-MCNC: 1.72 MG/DL (ref 0.67–1.17)
DEPRECATED HCO3 PLAS-SCNC: 26 MMOL/L (ref 22–29)
DIASTOLIC BLOOD PRESSURE - MUSE: NORMAL MMHG
EGFRCR SERPLBLD CKD-EPI 2021: 53 ML/MIN/1.73M2
EGFRCR SERPLBLD CKD-EPI 2021: 53 ML/MIN/1.73M2
EOSINOPHIL # BLD AUTO: 0.2 10E3/UL (ref 0–0.7)
EOSINOPHIL NFR BLD AUTO: 1 %
ERYTHROCYTE [DISTWIDTH] IN BLOOD BY AUTOMATED COUNT: 16.8 % (ref 10–15)
GLUCOSE SERPL-MCNC: 100 MG/DL (ref 70–99)
GLUCOSE UR STRIP-MCNC: NEGATIVE MG/DL
HCT VFR BLD AUTO: 35.8 % (ref 40–53)
HGB BLD-MCNC: 11.5 G/DL (ref 13.3–17.7)
HGB UR QL STRIP: NEGATIVE
HOLD SPECIMEN: NORMAL
IMM GRANULOCYTES # BLD: 0.4 10E3/UL
IMM GRANULOCYTES NFR BLD: 1 %
INTERPRETATION ECG - MUSE: NORMAL
KETONES UR STRIP-MCNC: NEGATIVE MG/DL
LEUKOCYTE ESTERASE UR QL STRIP: NEGATIVE
LIPASE SERPL-CCNC: 14 U/L (ref 13–60)
LYMPHOCYTES # BLD AUTO: 1.2 10E3/UL (ref 0.8–5.3)
LYMPHOCYTES NFR BLD AUTO: 5 %
MAGNESIUM SERPL-MCNC: 1.4 MG/DL (ref 1.7–2.3)
MCH RBC QN AUTO: 26 PG (ref 26.5–33)
MCHC RBC AUTO-ENTMCNC: 32.1 G/DL (ref 31.5–36.5)
MCV RBC AUTO: 81 FL (ref 78–100)
MONOCYTES # BLD AUTO: 1.4 10E3/UL (ref 0–1.3)
MONOCYTES NFR BLD AUTO: 5 %
MUCOUS THREADS #/AREA URNS LPF: PRESENT /LPF
NEUTROPHILS # BLD AUTO: 21.7 10E3/UL (ref 1.6–8.3)
NEUTROPHILS NFR BLD AUTO: 88 %
NITRATE UR QL: NEGATIVE
NRBC # BLD AUTO: 0 10E3/UL
NRBC BLD AUTO-RTO: 0 /100
NT-PROBNP SERPL-MCNC: ABNORMAL PG/ML (ref 0–450)
P AXIS - MUSE: 42 DEGREES
PH UR STRIP: 7 [PH] (ref 5–7)
PLATELET # BLD AUTO: 194 10E3/UL (ref 150–450)
POTASSIUM SERPL-SCNC: 3.7 MMOL/L (ref 3.4–5.3)
PR INTERVAL - MUSE: 194 MS
PROCALCITONIN SERPL IA-MCNC: 17.8 NG/ML
PROT SERPL-MCNC: 6.9 G/DL (ref 6.4–8.3)
QRS DURATION - MUSE: 94 MS
QT - MUSE: 382 MS
QTC - MUSE: 480 MS
R AXIS - MUSE: 21 DEGREES
RBC # BLD AUTO: 4.42 10E6/UL (ref 4.4–5.9)
RBC URINE: 0 /HPF
SODIUM SERPL-SCNC: 135 MMOL/L (ref 135–145)
SP GR UR STRIP: 1 (ref 1–1.03)
SYSTOLIC BLOOD PRESSURE - MUSE: NORMAL MMHG
T AXIS - MUSE: 175 DEGREES
TROPONIN T SERPL HS-MCNC: 45 NG/L
UROBILINOGEN UR STRIP-MCNC: 3 MG/DL
VENTRICULAR RATE- MUSE: 95 BPM
WBC # BLD AUTO: 24.8 10E3/UL (ref 4–11)
WBC URINE: <1 /HPF

## 2024-04-19 PROCEDURE — 85025 COMPLETE CBC W/AUTO DIFF WBC: CPT | Performed by: EMERGENCY MEDICINE

## 2024-04-19 PROCEDURE — 999N000157 HC STATISTIC RCP TIME EA 10 MIN

## 2024-04-19 PROCEDURE — 99223 1ST HOSP IP/OBS HIGH 75: CPT | Mod: GC | Performed by: INTERNAL MEDICINE

## 2024-04-19 PROCEDURE — 71045 X-RAY EXAM CHEST 1 VIEW: CPT | Mod: 26 | Performed by: RADIOLOGY

## 2024-04-19 PROCEDURE — 250N000011 HC RX IP 250 OP 636: Mod: JZ | Performed by: EMERGENCY MEDICINE

## 2024-04-19 PROCEDURE — 93010 ELECTROCARDIOGRAM REPORT: CPT | Performed by: EMERGENCY MEDICINE

## 2024-04-19 PROCEDURE — 94660 CPAP INITIATION&MGMT: CPT

## 2024-04-19 PROCEDURE — 99285 EMERGENCY DEPT VISIT HI MDM: CPT | Mod: 25 | Performed by: EMERGENCY MEDICINE

## 2024-04-19 PROCEDURE — 83880 ASSAY OF NATRIURETIC PEPTIDE: CPT | Performed by: EMERGENCY MEDICINE

## 2024-04-19 PROCEDURE — 83690 ASSAY OF LIPASE: CPT | Performed by: EMERGENCY MEDICINE

## 2024-04-19 PROCEDURE — 93005 ELECTROCARDIOGRAM TRACING: CPT | Performed by: EMERGENCY MEDICINE

## 2024-04-19 PROCEDURE — 96374 THER/PROPH/DIAG INJ IV PUSH: CPT | Performed by: EMERGENCY MEDICINE

## 2024-04-19 PROCEDURE — 84484 ASSAY OF TROPONIN QUANT: CPT | Performed by: EMERGENCY MEDICINE

## 2024-04-19 PROCEDURE — 71045 X-RAY EXAM CHEST 1 VIEW: CPT

## 2024-04-19 PROCEDURE — 250N000013 HC RX MED GY IP 250 OP 250 PS 637: Performed by: EMERGENCY MEDICINE

## 2024-04-19 PROCEDURE — 84145 PROCALCITONIN (PCT): CPT | Performed by: EMERGENCY MEDICINE

## 2024-04-19 PROCEDURE — 36415 COLL VENOUS BLD VENIPUNCTURE: CPT | Performed by: EMERGENCY MEDICINE

## 2024-04-19 PROCEDURE — 83735 ASSAY OF MAGNESIUM: CPT | Performed by: EMERGENCY MEDICINE

## 2024-04-19 PROCEDURE — 81001 URINALYSIS AUTO W/SCOPE: CPT | Performed by: EMERGENCY MEDICINE

## 2024-04-19 PROCEDURE — 99291 CRITICAL CARE FIRST HOUR: CPT | Mod: 25 | Performed by: EMERGENCY MEDICINE

## 2024-04-19 PROCEDURE — 82565 ASSAY OF CREATININE: CPT | Performed by: INTERNAL MEDICINE

## 2024-04-19 PROCEDURE — 250N000013 HC RX MED GY IP 250 OP 250 PS 637: Performed by: INTERNAL MEDICINE

## 2024-04-19 PROCEDURE — 87040 BLOOD CULTURE FOR BACTERIA: CPT | Performed by: EMERGENCY MEDICINE

## 2024-04-19 PROCEDURE — 120N000005 HC R&B MS OVERFLOW UMMC

## 2024-04-19 PROCEDURE — 80053 COMPREHEN METABOLIC PANEL: CPT | Performed by: EMERGENCY MEDICINE

## 2024-04-19 PROCEDURE — 250N000011 HC RX IP 250 OP 636: Performed by: INTERNAL MEDICINE

## 2024-04-19 RX ORDER — CARVEDILOL 25 MG/1
25 TABLET ORAL 2 TIMES DAILY
Status: DISCONTINUED | OUTPATIENT
Start: 2024-04-19 | End: 2024-04-20

## 2024-04-19 RX ORDER — HYDRALAZINE HYDROCHLORIDE 20 MG/ML
10 INJECTION INTRAMUSCULAR; INTRAVENOUS EVERY 4 HOURS PRN
Status: DISCONTINUED | OUTPATIENT
Start: 2024-04-19 | End: 2024-04-21 | Stop reason: HOSPADM

## 2024-04-19 RX ORDER — BUMETANIDE 0.25 MG/ML
2 INJECTION INTRAMUSCULAR; INTRAVENOUS EVERY 12 HOURS
Status: DISCONTINUED | OUTPATIENT
Start: 2024-04-19 | End: 2024-04-21 | Stop reason: HOSPADM

## 2024-04-19 RX ORDER — AZITHROMYCIN 250 MG/1
250 TABLET, FILM COATED ORAL DAILY
Status: DISCONTINUED | OUTPATIENT
Start: 2024-04-20 | End: 2024-04-20

## 2024-04-19 RX ORDER — ACETAMINOPHEN 650 MG/1
650 SUPPOSITORY RECTAL EVERY 4 HOURS PRN
Status: DISCONTINUED | OUTPATIENT
Start: 2024-04-19 | End: 2024-04-21 | Stop reason: HOSPADM

## 2024-04-19 RX ORDER — LIDOCAINE 40 MG/G
CREAM TOPICAL
Status: DISCONTINUED | OUTPATIENT
Start: 2024-04-19 | End: 2024-04-21 | Stop reason: HOSPADM

## 2024-04-19 RX ORDER — MAGNESIUM HYDROXIDE/ALUMINUM HYDROXICE/SIMETHICONE 120; 1200; 1200 MG/30ML; MG/30ML; MG/30ML
30 SUSPENSION ORAL EVERY 4 HOURS PRN
Status: DISCONTINUED | OUTPATIENT
Start: 2024-04-19 | End: 2024-04-21 | Stop reason: HOSPADM

## 2024-04-19 RX ORDER — MAGNESIUM SULFATE HEPTAHYDRATE 40 MG/ML
2 INJECTION, SOLUTION INTRAVENOUS ONCE
Status: COMPLETED | OUTPATIENT
Start: 2024-04-19 | End: 2024-04-19

## 2024-04-19 RX ORDER — POLYETHYLENE GLYCOL 3350 17 G/17G
17 POWDER, FOR SOLUTION ORAL DAILY
Status: DISCONTINUED | OUTPATIENT
Start: 2024-04-20 | End: 2024-04-21 | Stop reason: HOSPADM

## 2024-04-19 RX ORDER — AZITHROMYCIN 250 MG/1
500 TABLET, FILM COATED ORAL ONCE
Status: COMPLETED | OUTPATIENT
Start: 2024-04-19 | End: 2024-04-19

## 2024-04-19 RX ORDER — CEFTRIAXONE 1 G/1
1 INJECTION, POWDER, FOR SOLUTION INTRAMUSCULAR; INTRAVENOUS EVERY 24 HOURS
Status: DISCONTINUED | OUTPATIENT
Start: 2024-04-19 | End: 2024-04-20

## 2024-04-19 RX ORDER — FUROSEMIDE 10 MG/ML
40 INJECTION INTRAMUSCULAR; INTRAVENOUS ONCE
Status: COMPLETED | OUTPATIENT
Start: 2024-04-19 | End: 2024-04-19

## 2024-04-19 RX ORDER — LISINOPRIL 40 MG/1
40 TABLET ORAL DAILY
Status: DISCONTINUED | OUTPATIENT
Start: 2024-04-20 | End: 2024-04-21 | Stop reason: HOSPADM

## 2024-04-19 RX ORDER — LISINOPRIL 40 MG/1
40 TABLET ORAL ONCE
Qty: 1 TABLET | Refills: 0 | Status: COMPLETED | OUTPATIENT
Start: 2024-04-19 | End: 2024-04-19

## 2024-04-19 RX ORDER — LISINOPRIL 40 MG/1
40 TABLET ORAL ONCE
Status: DISCONTINUED | OUTPATIENT
Start: 2024-04-19 | End: 2024-04-19

## 2024-04-19 RX ORDER — SPIRONOLACTONE 25 MG/1
100 TABLET ORAL DAILY
Status: DISCONTINUED | OUTPATIENT
Start: 2024-04-20 | End: 2024-04-21 | Stop reason: HOSPADM

## 2024-04-19 RX ORDER — OXYCODONE HYDROCHLORIDE 5 MG/1
5 TABLET ORAL
Status: COMPLETED | OUTPATIENT
Start: 2024-04-19 | End: 2024-04-20

## 2024-04-19 RX ORDER — ENOXAPARIN SODIUM 100 MG/ML
40 INJECTION SUBCUTANEOUS EVERY 24 HOURS
Status: DISCONTINUED | OUTPATIENT
Start: 2024-04-19 | End: 2024-04-19

## 2024-04-19 RX ORDER — LABETALOL HYDROCHLORIDE 5 MG/ML
10 INJECTION, SOLUTION INTRAVENOUS ONCE
Status: COMPLETED | OUTPATIENT
Start: 2024-04-19 | End: 2024-04-19

## 2024-04-19 RX ORDER — ACETAMINOPHEN 325 MG/1
650 TABLET ORAL EVERY 4 HOURS PRN
Status: DISCONTINUED | OUTPATIENT
Start: 2024-04-19 | End: 2024-04-21 | Stop reason: HOSPADM

## 2024-04-19 RX ADMIN — HYDRALAZINE HYDROCHLORIDE 10 MG: 20 INJECTION INTRAMUSCULAR; INTRAVENOUS at 20:01

## 2024-04-19 RX ADMIN — MAGNESIUM SULFATE HEPTAHYDRATE 2 G: 2 INJECTION, SOLUTION INTRAVENOUS at 16:55

## 2024-04-19 RX ADMIN — ENOXAPARIN SODIUM 40 MG: 40 INJECTION SUBCUTANEOUS at 17:55

## 2024-04-19 RX ADMIN — FUROSEMIDE 40 MG: 10 INJECTION, SOLUTION INTRAMUSCULAR; INTRAVENOUS at 16:55

## 2024-04-19 RX ADMIN — APIXABAN 5 MG: 5 TABLET, FILM COATED ORAL at 20:09

## 2024-04-19 RX ADMIN — CEFTRIAXONE SODIUM 1 G: 1 INJECTION, POWDER, FOR SOLUTION INTRAMUSCULAR; INTRAVENOUS at 20:01

## 2024-04-19 RX ADMIN — LISINOPRIL 40 MG: 40 TABLET ORAL at 16:55

## 2024-04-19 RX ADMIN — BUMETANIDE 2 MG: 0.25 INJECTION INTRAMUSCULAR; INTRAVENOUS at 20:05

## 2024-04-19 RX ADMIN — LABETALOL HYDROCHLORIDE 10 MG: 5 INJECTION, SOLUTION INTRAVENOUS at 15:41

## 2024-04-19 RX ADMIN — AZITHROMYCIN DIHYDRATE 500 MG: 250 TABLET ORAL at 20:06

## 2024-04-19 ASSESSMENT — COLUMBIA-SUICIDE SEVERITY RATING SCALE - C-SSRS
1. IN THE PAST MONTH, HAVE YOU WISHED YOU WERE DEAD OR WISHED YOU COULD GO TO SLEEP AND NOT WAKE UP?: NO
6. HAVE YOU EVER DONE ANYTHING, STARTED TO DO ANYTHING, OR PREPARED TO DO ANYTHING TO END YOUR LIFE?: NO
2. HAVE YOU ACTUALLY HAD ANY THOUGHTS OF KILLING YOURSELF IN THE PAST MONTH?: NO

## 2024-04-19 ASSESSMENT — ACTIVITIES OF DAILY LIVING (ADL)
ADLS_ACUITY_SCORE: 37

## 2024-04-19 NOTE — ED PROVIDER NOTES
[place] EMERGENCY DEPARTMENT ([facility])    4/19/24       ED PROVIDER NOTE       History     Chief Complaint   Patient presents with    Chest Pain    Shortness of Breath     The history is provided by medical records, the patient and the EMS personnel.     Miguel Ángel Wilson is a 33 year old male with history of hypertension, atrial fibrillation, CKD, HFrEF who presents to the emergency department with concerns of chest pain and shortness of breath.  She called EMS today due to shortness of breath.  He was found to have oxygen saturation of 88% on room air.  He was placed on oxygen with improvement in his symptoms.  He states he has not taken his medication for the past 10 days as he ran out.  Patient has been trying to get into his primary care clinic and was supposed to follow-up today but his appointment was canceled.  Nitroglycerin was administered and route.  Point-of-care blood sugar was 126.  States that his dyspnea began yesterday.  He denies any fever or cough.  He had some central chest pain yesterday but not today.  Patient states he also developed some left upper quadrant abdominal pain yesterday and had some associated nausea and vomiting.  He has not had abdominal pain or vomiting today.  He denies any diarrhea or constipation.  He denies any dysuria, urgency, or frequency.    Past Medical History  Past Medical History:   Diagnosis Date    Atrial flutter with rapid ventricular response (H) 1/31/2022    Essential hypertension 1/31/2022    Hypertension     Morbid obesity (H) 1/31/2022    Tobacco use 1/31/2022     Past Surgical History:   Procedure Laterality Date    IR RENAL ANGIOGRAM LEFT  4/19/2023    IR RENAL ANGIOGRAM LEFT  4/24/2023    IR RENAL EMBOLIZATION  4/19/2023     apixaban ANTICOAGULANT (ELIQUIS) 5 MG tablet  carvedilol (COREG) 25 MG tablet  lisinopril (ZESTRIL) 40 MG tablet  polyethylene glycol (MIRALAX) 17 GM/Dose powder  spironolactone (ALDACTONE) 100 MG tablet  torsemide  (DEMADEX) 20 MG tablet      No Known Allergies  Family History  No family history on file.  Social History   Social History     Tobacco Use    Smoking status: Never     Passive exposure: Never    Smokeless tobacco: Never   Vaping Use    Vaping status: Never Used   Substance Use Topics    Alcohol use: No    Drug use: No         A complete review of systems was performed with pertinent positives and negatives noted in the HPI, and all other systems negative.    Physical Exam   BP: (!) 161/109  Pulse: 95  Temp: 98.3  F (36.8  C)  Resp: 22  SpO2: 99 %  Physical Exam  Vitals and nursing note reviewed.   Constitutional:       General: He is not in acute distress.     Appearance: He is well-developed. He is not diaphoretic.   HENT:      Head: Atraumatic.   Eyes:      General: No scleral icterus.     Pupils: Pupils are equal, round, and reactive to light.   Cardiovascular:      Heart sounds: Normal heart sounds.   Pulmonary:      Effort: No respiratory distress.      Breath sounds: Examination of the right-lower field reveals rales. Examination of the left-lower field reveals rales. Rales present.   Abdominal:      General: Bowel sounds are normal.      Palpations: Abdomen is soft.      Tenderness: There is no abdominal tenderness.   Musculoskeletal:         General: No tenderness.      Right lower leg: Edema present.      Left lower leg: Edema present.   Skin:     General: Skin is warm.      Findings: No rash.   Neurological:      Mental Status: He is alert.           ED Course, Procedures, & Data      Procedures            EKG Interpretation:      Interpreted by RUIZ RIVERA MD, MD  Time reviewed: 1350  Symptoms at time of EKG: SOA   Rhythm: normal sinus   Rate: 95  Axis: Normal  Ectopy: none  Conduction: normal  ST Segments/ T Waves: T wave inversion inferolateral  Q Waves: none  Comparison to prior: Biphasic T waves in V5.  Otherwise unchanged from previous    Clinical Impression: non-specific EKG       Results for  orders placed or performed during the hospital encounter of 04/19/24   XR Chest Port 1 View     Status: None    Narrative    Portable chest 4/19/2024 at 1558 hours    FINDINGS: Shoulder there    COMPARISON: 4/19/2023    FINDINGS: Mild perihilar vascular crowding noted which may indicate  mild peribronchial cuffing pulmonary venous congestion suggestive of  mild positive volume balance. Heart is mildly enlarged. Costophrenic  angles appear sharp. No consolidations. Atherosclerotic calcifications  in the thoracic aorta.      Impression    IMPRESSION: Subtle positive fluid volume balance.    BARRETT GUERRERO MD         SYSTEM ID:  [id number]   [place] Draw     Status: None    Narrative    The following orders were created for panel order [place] Draw.  Procedure                               Abnormality         Status                     ---------                               -----------         ------                     Extra Blue Top Tube[[id number]]                              Final result               Extra Red Top Tube[[id number]]                               Final result               Extra Green Top (Lithium...[[id number]]                      Final result               Extra Purple Top Tube[[id number]]                            Final result                 Please view results for these tests on the individual orders.   Extra Blue Top Tube     Status: None   Result Value Ref Range    Hold Specimen JIC    Extra Red Top Tube     Status: None   Result Value Ref Range    Hold Specimen JIC    Extra Green Top (Lithium Heparin) Tube     Status: None   Result Value Ref Range    Hold Specimen JIC    Extra Purple Top Tube     Status: None   Result Value Ref Range    Hold Specimen JIC    Comprehensive metabolic panel     Status: Abnormal   Result Value Ref Range    Sodium 135 135 - 145 mmol/L    Potassium 3.7 3.4 - 5.3 mmol/L    Carbon Dioxide (CO2) 26 22 - 29 mmol/L    Anion Gap 13 7 - 15 mmol/L    Urea Nitrogen 18.5  6.0 - 20.0 mg/dL    Creatinine 1.72 (H) 0.67 - 1.17 mg/dL    GFR Estimate 53 (L) >60 mL/min/1.73m2    Calcium 8.7 8.6 - 10.0 mg/dL    Chloride 96 (L) 98 - 107 mmol/L    Glucose 100 (H) 70 - 99 mg/dL    Alkaline Phosphatase 90 40 - 150 U/L    AST 31 0 - 45 U/L    ALT 20 0 - 70 U/L    Protein Total 6.9 6.4 - 8.3 g/dL    Albumin 3.8 3.5 - 5.2 g/dL    Bilirubin Total 2.0 (H) <=1.2 mg/dL   Lipase     Status: Normal   Result Value Ref Range    Lipase 14 13 - 60 U/L   Troponin T, High Sensitivity     Status: Abnormal   Result Value Ref Range    Troponin T, High Sensitivity 45 (H) <=22 ng/L   Nt probnp inpatient (BNP)     Status: Abnormal   Result Value Ref Range    N terminal Pro BNP Inpatient 16,530 (H) 0 - 450 pg/mL   Magnesium     Status: Abnormal   Result Value Ref Range    Magnesium 1.4 (L) 1.7 - 2.3 mg/dL   Extra Tube ([place] Draw)     Status: None    Narrative    The following orders were created for panel order Extra Tube ([place] Draw).  Procedure                               Abnormality         Status                     ---------                               -----------         ------                     Extra Green Top (Lithium...[[id number]]                      Final result                 Please view results for these tests on the individual orders.   Extra Green Top (Lithium Heparin) Tube     Status: None   Result Value Ref Range    Hold Specimen [facility]    CBC with platelets and differential     Status: Abnormal   Result Value Ref Range    WBC Count 24.8 (H) 4.0 - 11.0 10e3/uL    RBC Count 4.42 4.40 - 5.90 10e6/uL    Hemoglobin 11.5 (L) 13.3 - 17.7 g/dL    Hematocrit 35.8 (L) 40.0 - 53.0 %    MCV 81 78 - 100 fL    MCH 26.0 (L) 26.5 - 33.0 pg    MCHC 32.1 31.5 - 36.5 g/dL    RDW 16.8 (H) 10.0 - 15.0 %    Platelet Count 194 150 - 450 10e3/uL    % Neutrophils 88 %    % Lymphocytes 5 %    % Monocytes 5 %    % Eosinophils 1 %    % Basophils 0 %    % Immature Granulocytes 1 %    NRBCs per 100 WBC 0 <1 /100     Absolute Neutrophils 21.7 (H) 1.6 - 8.3 10e3/uL    Absolute Lymphocytes 1.2 0.8 - 5.3 10e3/uL    Absolute Monocytes 1.4 (H) 0.0 - 1.3 10e3/uL    Absolute Eosinophils 0.2 0.0 - 0.7 10e3/uL    Absolute Basophils 0.0 0.0 - 0.2 10e3/uL    Absolute Immature Granulocytes 0.4 <=0.4 10e3/uL    Absolute NRBCs 0.0 10e3/uL   EKG 12-lead, tracing only     Status: None (Preliminary result)   Result Value Ref Range    Systolic Blood Pressure  mmHg    Diastolic Blood Pressure  mmHg    Ventricular Rate 95 BPM    Atrial Rate 95 BPM    HI Interval 194 ms    QRS Duration 94 ms     ms    QTc 480 ms    P Axis 42 degrees    R AXIS 21 degrees    T Axis 175 degrees    Interpretation ECG       Sinus rhythm  Possible Left atrial enlargement  Left ventricular hypertrophy  ST & T wave abnormality, consider inferolateral ischemia  Prolonged QT  Abnormal ECG     CBC with platelets differential     Status: Abnormal    Narrative    The following orders were created for panel order CBC with platelets differential.  Procedure                               Abnormality         Status                     ---------                               -----------         ------                     CBC with platelets and d...[[id number]]  Abnormal            Final result                 Please view results for these tests on the individual orders.     Medications   magnesium sulfate 2 g in 50 mL sterile water intermittent infusion (2 g Intravenous $New Bag 4/19/24 1655)   labetalol (NORMODYNE/TRANDATE) injection 10 mg (10 mg Intravenous $Given 4/19/24 1541)   lisinopril (ZESTRIL) tablet 40 mg (40 mg Oral $Given 4/19/24 1655)   furosemide (LASIX) injection 40 mg (40 mg Intravenous $Given 4/19/24 1655)     Labs Ordered and Resulted from Time of ED Arrival to Time of ED Departure   COMPREHENSIVE METABOLIC PANEL - Abnormal       Result Value    Sodium 135      Potassium 3.7      Carbon Dioxide (CO2) 26      Anion Gap 13      Urea Nitrogen 18.5      Creatinine  1.72 (*)     GFR Estimate 53 (*)     Calcium 8.7      Chloride 96 (*)     Glucose 100 (*)     Alkaline Phosphatase 90      AST 31      ALT 20      Protein Total 6.9      Albumin 3.8      Bilirubin Total 2.0 (*)    TROPONIN T, HIGH SENSITIVITY - Abnormal    Troponin T, High Sensitivity 45 (*)    NT PROBNP INPATIENT - Abnormal    N terminal Pro BNP Inpatient 16,530 (*)    MAGNESIUM - Abnormal    Magnesium 1.4 (*)    CBC WITH PLATELETS AND DIFFERENTIAL - Abnormal    WBC Count 24.8 (*)     RBC Count 4.42      Hemoglobin 11.5 (*)     Hematocrit 35.8 (*)     MCV 81      MCH 26.0 (*)     MCHC 32.1      RDW 16.8 (*)     Platelet Count 194      % Neutrophils 88      % Lymphocytes 5      % Monocytes 5      % Eosinophils 1      % Basophils 0      % Immature Granulocytes 1      NRBCs per 100 WBC 0      Absolute Neutrophils 21.7 (*)     Absolute Lymphocytes 1.2      Absolute Monocytes 1.4 (*)     Absolute Eosinophils 0.2      Absolute Basophils 0.0      Absolute Immature Granulocytes 0.4      Absolute NRBCs 0.0     LIPASE - Normal    Lipase 14     PROCALCITONIN     XR Chest Port 1 View   Final Result   IMPRESSION: Subtle positive fluid volume balance.      BARRETT GUERRERO MD            SYSTEM ID:  [id number]         Accepted for admission to cardiology 1 service by Dr. Souleymane Verma.    Critical care was performed.   Critical Care Addendum  My initial assessment, based on my review of prehospital provider report, review of vital signs, focused history, and physical exam, established a high suspicion that Miguel Ángel Wilson has respiratory distress and severe hypertension, which requires immediate intervention, and therefore he is critically ill.     After the initial assessment, the care team initiated multiple lab tests and initiated medication therapy with labetalol and lasix  to provide stabilization care. Due to the critical nature of this patient, I reassessed vital signs, physical exam, review of cardiac rhythm monitor, mental  status, and respiratory status multiple times prior to his disposition.     Time also spent performing documentation, reviewing test results, and coordination of care.    Patient rechecked.  Reports he is feeling better on supplemental oxygen.  Denies any abdominal pain.  He again confirms that he has not had a fever nor cough or other infectious symptoms.  5:13 PM-procalcitonin level elevated so we will undertake infectious workup including blood cultures and urinalysis.     Critical care time (excluding teaching time and procedures): 30 minutes.     Assessment & Plan    33 year old male with history of hypertension, atrial fibrillation, HFrEF to the emergency department with shortness of breath and hypoxia.  He has been off of his medicines for approximately 10 days.  Last night, the patient had central chest pain as well as his left upper quadrant abdominal pain with associated nausea and vomiting.  He has no pain today.  He has not had a fever, cough, dysuria, rash, or any infectious symptoms.  The patient is hypertensive here in the emergency department requiring treatment with IV labetalol with improvement.  He was also given his home dose of oral lisinopril.  He is on supplemental oxygen to maintain normal oxygen saturations.  His EKG does not reveal any acute ischemic change and his troponin is 45 which is lower than previous troponins.  His BNP is elevated to 16,500 and he does have evidence for interstitial edema on his chest radiograph.  He has pitting lower extremity edema.  His presentation is consistent with heart failure exacerbation.  As he is requiring supplemental oxygen, he will require admission to the cardiology service.  Patient given IV Lasix here in the emergency department.  He does have a leukocytosis of 24,000 but no symptoms of infection.    I have reviewed the nursing notes. I have reviewed the findings, diagnosis, plan and need for follow up with the patient.    New Prescriptions    No  medications on file       Final diagnoses:   Acute on chronic congestive heart failure, unspecified heart failure type (H)     Chart documentation was completed with Dragon voice-recognition software. Even though reviewed, this chart may still contain some grammatical, spelling, and word errors.     I, Brennen Denton, am serving as a trained medical scribe to document services personally performed by Nitesh Gomez Md, MD based on the provider's statements to me on April 19, 2024.  This document has been checked and approved by the attending provider.    I, Nitesh Gomez Md, MD, was physically present and have reviewed and verified the accuracy of this note documented by Brennen Denton, medical scribe.      Nitesh Gomez Md, MD   [facility] EMERGENCY DEPARTMENT  4/19/2024        Nitesh Gomez MD  04/19/24 1705       Nitesh Gomez MD  04/19/24 1594

## 2024-04-19 NOTE — ED TRIAGE NOTES
Pt BIBA from home. SATS 88% on RA. Usually on RA. Hx HF. Out of all medications for last 10 days. Last two appointments where canceled. 324 asa 1 spray nitroglycerin given en route.

## 2024-04-19 NOTE — H&P
History and Physical    Cards 1      Date of Service: 24  Date of Admission: 2024  Patient Name: Miguel Ángel Wilson  : 1990  MRN: [id number]       Chief complaint:   Abdominal pain     History of Present Illness:   Miguel Ángel Wilson is a 33 year old male with PMH of Atrial flutter, HTN, HFrEF that presents complaining of SOB.   Patient refers that starting yesterday at 4pm, he felt a sudden abdominal pain, localized in the left upper quadrant, described as squeezing, breathing made it worse and associated to emesis and SOB. Pain started to get worse around 10pm but he decided to stay at home over the night and come to the ER during the day. At home he called EMS due to shortness of breath. He was found to have oxygen saturation of 88% on room air and was brought to the ER.  At arrival to the ER patient was without pain, but he was found to have acute hypoxemia and hypertensive, and he was started on o2 and blood pressure control.     At the moment of my assessment patient was on 4L NC and slightly tachycardic 90-95s. He told me that he has been out for 4-6 months or so of his home torsemide and other medications that he was supposed to be taking. He remembers being taking Elequis and metoprolol       Review of Symptoms:     Comprehensive 10 point review of systems was negative unless otherwise noted in the HPI.     Past Medical History:     Past Medical History:   Diagnosis Date    Atrial flutter with rapid ventricular response (H) 2022    Essential hypertension 2022    Hypertension     Morbid obesity (H) 2022    Tobacco use 2022       Past Surgical History:   Procedure Laterality Date    IR RENAL ANGIOGRAM LEFT  2023    IR RENAL ANGIOGRAM LEFT  2023    IR RENAL EMBOLIZATION  2023        Allergies:     No Known Allergies     Outpatient Medications:     Current Facility-Administered Medications   Medication Dose Route Frequency Provider Last Rate Last Admin     acetaminophen (TYLENOL) Suppository 650 mg  650 mg Rectal Q4H PRN Artem Avelar MD        acetaminophen (TYLENOL) tablet 650 mg  650 mg Oral Q4H PRN Artem Avelar MD        alum & mag hydroxide-simethicone (MAALOX) suspension 30 mL  30 mL Oral Q4H PRN Artem Avelar MD        enoxaparin ANTICOAGULANT (LOVENOX) injection 40 mg  40 mg Subcutaneous Q24H Artem Avelar MD        lidocaine (LMX4) cream   Topical Q1H PRN Artem Avelar MD        lidocaine 1 % 0.1-1 mL  0.1-1 mL Other Q1H PRN Artem Avelar MD        magnesium sulfate 2 g in 50 mL sterile water intermittent infusion  2 g Intravenous Once Nitesh Gomez MD 50 mL/hr at 04/19/24 1655 2 g at 04/19/24 1655    medication instruction   Does not apply Continuous PRN Artem Avelar MD        sodium chloride (PF) 0.9% PF flush 3 mL  3 mL Intracatheter Q8H Artem Avelar MD        sodium chloride (PF) 0.9% PF flush 3 mL  3 mL Intracatheter q1 min prn Artem Avelar MD         Current Outpatient Medications   Medication Sig Dispense Refill    apixaban ANTICOAGULANT (ELIQUIS) 5 MG tablet Take 1 tablet (5 mg) by mouth 2 times daily 60 tablet 0    carvedilol (COREG) 25 MG tablet Take 1 tablet (25 mg) by mouth 2 times daily for 360 days 180 tablet 3    lisinopril (ZESTRIL) 40 MG tablet Take 1 tablet (40 mg) by mouth daily 90 tablet 3    polyethylene glycol (MIRALAX) 17 GM/Dose powder Take 17 g by mouth daily 510 g 3    spironolactone (ALDACTONE) 100 MG tablet Take 1 tablet (100 mg) by mouth daily 90 tablet 3    torsemide (DEMADEX) 20 MG tablet Take 1 tablet (20 mg) by mouth 2 times daily 60 tablet 3        Family History:   No family history on file.     Social History:     Social History     Tobacco Use    Smoking status: Never     Passive exposure: Never    Smokeless tobacco: Never   Vaping Use    Vaping status: Never Used   Substance Use Topics    Alcohol use: No    Drug  use: No       Tobacco Use: Denies using smokeless tobacco and smoking.   Illicit Drug Use: Marihuana  Alcohol Use: Denies        Physical Exam:   Blood pressure (!) 134/110, pulse 100, temperature 98.3  F (36.8  C), temperature source Oral, resp. rate (!) 32, SpO2 100%.  Temp (24hrs), Av.3  F (36.8  C), Min:98.3  F (36.8  C), Max:98.3  F (36.8  C)      Gen: no acute distress  HEENT: no scleral icterus, pupils equal and reactive to light, moist mucous membranes, no nasal discharge.  NECK: no adenopathy, no asymmetry, masses, or scars, thyroid normal to palpation and no bruits, JVP not elevated  CARDIOVASCULAR: normal rate, regular rhythm. S1/S2 normal, no murmurs, rubs or gallops   RESPIRATORY: clear to auscultation bilaterally, no rales, rhonchi or wheezes, no use of accessory muscles, no retractions, respirations unlabored   ABDOMEN: soft, non-tender abdomen without rebound or guarding, no hepatosplenomegaly, no palpable masses, bowel sounds present  EXTREMITIES: peripheral pulses normal, no peripheral edema, warm, capillary refill < 2 seconds  Skin: no ecchymoses, no rashes  NEURO: oriented to person, place, year, and situation; CN II-XII grossly intact; 5/5 strength throughout; sensation to light touch intact throughout; coordination intact; negative Romberg; gait normal  PSYCH: affect appropriate      Data:   CMP  Recent Labs   Lab 24  1406 24  1343   NA  --  135   POTASSIUM  --  3.7   CHLORIDE  --  96*   CO2  --  26   ANIONGAP  --  13   GLC  --  100*   BUN  --  18.5   CR 1.72* 1.72*   GFRESTIMATED 53* 53*   EFREM  --  8.7   MAG  --  1.4*   PROTTOTAL  --  6.9   ALBUMIN  --  3.8   BILITOTAL  --  2.0*   ALKPHOS  --  90   AST  --  31   ALT  --  20     CBC  Recent Labs   Lab 24  1343   WBC 24.8*   RBC 4.42   HGB 11.5*   HCT 35.8*   MCV 81   MCH 26.0*   MCHC 32.1   RDW 16.8*        INRNo lab results found in last 7 days.  Arterial Blood GasNo lab results found in last 7 days.         Assessment/Plan:   Miguel Ángel Wilson is a 33 year old year old with PMHx of Atrial flutter, HTN, HFrEF who presents with acute on chronic HFrEF and uncontrolled hypertension     Patient came with clear acute on chornic HFrEF, probable reason of decompensation is due to non compliance to adequate medications and or on going infection. I do not have a clear source of infection but Xray looks suspicious to me. I am going to treat him empirically for CAP.      Neuro  - No concerns    Respirator/ID  Acute hypoxemic respiratory failure secondary to pulmonary edema/CAP  Sepsis (SOFA>2) due to atypical Community acquired pneumonia (xray does not show that significant)   - PCT and WBC elevated. Worsening creatinine   Plan:  Start ceftriaxone and Azithro    Cardiovascular:  Acute on chronic HFrEF due to NICM (Hypertensive? - no ischemic evaluation has been done)  Uncontrolled hypertension  Atrial flutter CHADSVASC:2     Plan:  Hold BB in acute decompensation  Restart ACEI/MRA  GDMT will be adjusted during hospitalization   Restart apixaban   Bumetanide 2mg BID IV   Hydralazine 10mg PRN for SBP >160.    Renal:  DORY over CKD  - Probably secondary to infection and or cardiorenal.     Plan:  Monitor I/O    Hematology:  No concerns.     Full code     Patient discussed with Dr Mai Pires who agrees with the plan detailed above. Please see attending addendum for changes to plan.     Artem Avelar MD   Cardiology Fellow     I very much appreciated the opportunity to see and assess Miguel Ángel Wilson in the hospital on morning after Admission for HF exacerbation. Has diuresed well o/n. Echo today I progress. I agree withthe note above which summarizes my findings and current recommendations.  Please do not hesitate to contact my office if you have any questions or concerns.      Shane Hatfield MD  Cardiac Arrhythmia Service  [facility]  [phone]

## 2024-04-20 ENCOUNTER — APPOINTMENT (OUTPATIENT)
Dept: CARDIOLOGY | Facility: CLINIC | Age: 34
End: 2024-04-20
Attending: INTERNAL MEDICINE
Payer: COMMERCIAL

## 2024-04-20 LAB
ALBUMIN SERPL BCG-MCNC: 4 G/DL (ref 3.5–5.2)
ALP SERPL-CCNC: 100 U/L (ref 40–150)
ALT SERPL W P-5'-P-CCNC: 26 U/L (ref 0–70)
ANION GAP SERPL CALCULATED.3IONS-SCNC: 11 MMOL/L (ref 7–15)
ANION GAP SERPL CALCULATED.3IONS-SCNC: 12 MMOL/L (ref 7–15)
ANION GAP SERPL CALCULATED.3IONS-SCNC: 13 MMOL/L (ref 7–15)
AST SERPL W P-5'-P-CCNC: 33 U/L (ref 0–45)
BILIRUB SERPL-MCNC: 1.5 MG/DL
BUN SERPL-MCNC: 20.8 MG/DL (ref 6–20)
BUN SERPL-MCNC: 20.8 MG/DL (ref 6–20)
BUN SERPL-MCNC: 20.9 MG/DL (ref 6–20)
CALCIUM SERPL-MCNC: 9 MG/DL (ref 8.6–10)
CHLORIDE SERPL-SCNC: 97 MMOL/L (ref 98–107)
CHLORIDE SERPL-SCNC: 97 MMOL/L (ref 98–107)
CHLORIDE SERPL-SCNC: 98 MMOL/L (ref 98–107)
CHOLEST SERPL-MCNC: 125 MG/DL
CREAT SERPL-MCNC: 1.47 MG/DL (ref 0.67–1.17)
CREAT SERPL-MCNC: 1.47 MG/DL (ref 0.67–1.17)
CREAT SERPL-MCNC: 1.61 MG/DL (ref 0.67–1.17)
DEPRECATED HCO3 PLAS-SCNC: 29 MMOL/L (ref 22–29)
DEPRECATED HCO3 PLAS-SCNC: 30 MMOL/L (ref 22–29)
DEPRECATED HCO3 PLAS-SCNC: 31 MMOL/L (ref 22–29)
EGFRCR SERPLBLD CKD-EPI 2021: 58 ML/MIN/1.73M2
EGFRCR SERPLBLD CKD-EPI 2021: 65 ML/MIN/1.73M2
EGFRCR SERPLBLD CKD-EPI 2021: 65 ML/MIN/1.73M2
ERYTHROCYTE [DISTWIDTH] IN BLOOD BY AUTOMATED COUNT: 17.2 % (ref 10–15)
GLUCOSE SERPL-MCNC: 115 MG/DL (ref 70–99)
GLUCOSE SERPL-MCNC: 116 MG/DL (ref 70–99)
GLUCOSE SERPL-MCNC: 116 MG/DL (ref 70–99)
HBA1C MFR BLD: 6.1 %
HCT VFR BLD AUTO: 42.6 % (ref 40–53)
HDLC SERPL-MCNC: 35 MG/DL
HGB BLD-MCNC: 13.5 G/DL (ref 13.3–17.7)
LACTATE SERPL-SCNC: 1 MMOL/L (ref 0.7–2)
LDLC SERPL CALC-MCNC: 72 MG/DL
LVEF ECHO: NORMAL
MAGNESIUM SERPL-MCNC: 1.6 MG/DL (ref 1.7–2.3)
MCH RBC QN AUTO: 25.5 PG (ref 26.5–33)
MCHC RBC AUTO-ENTMCNC: 31.7 G/DL (ref 31.5–36.5)
MCV RBC AUTO: 81 FL (ref 78–100)
NONHDLC SERPL-MCNC: 90 MG/DL
PLATELET # BLD AUTO: 201 10E3/UL (ref 150–450)
POTASSIUM SERPL-SCNC: 3.1 MMOL/L (ref 3.4–5.3)
POTASSIUM SERPL-SCNC: 3.3 MMOL/L (ref 3.4–5.3)
POTASSIUM SERPL-SCNC: 3.5 MMOL/L (ref 3.4–5.3)
POTASSIUM SERPL-SCNC: 3.5 MMOL/L (ref 3.4–5.3)
PROCALCITONIN SERPL IA-MCNC: 12.4 NG/ML
PROT SERPL-MCNC: 7.6 G/DL (ref 6.4–8.3)
RBC # BLD AUTO: 5.29 10E6/UL (ref 4.4–5.9)
SODIUM SERPL-SCNC: 139 MMOL/L (ref 135–145)
SODIUM SERPL-SCNC: 139 MMOL/L (ref 135–145)
SODIUM SERPL-SCNC: 140 MMOL/L (ref 135–145)
TRIGL SERPL-MCNC: 89 MG/DL
TROPONIN T SERPL HS-MCNC: 41 NG/L
WBC # BLD AUTO: 15.9 10E3/UL (ref 4–11)

## 2024-04-20 PROCEDURE — 83605 ASSAY OF LACTIC ACID: CPT | Performed by: INTERNAL MEDICINE

## 2024-04-20 PROCEDURE — 255N000002 HC RX 255 OP 636: Performed by: INTERNAL MEDICINE

## 2024-04-20 PROCEDURE — 83735 ASSAY OF MAGNESIUM: CPT

## 2024-04-20 PROCEDURE — 83036 HEMOGLOBIN GLYCOSYLATED A1C: CPT

## 2024-04-20 PROCEDURE — 80061 LIPID PANEL: CPT

## 2024-04-20 PROCEDURE — 36415 COLL VENOUS BLD VENIPUNCTURE: CPT | Performed by: INTERNAL MEDICINE

## 2024-04-20 PROCEDURE — 93306 TTE W/DOPPLER COMPLETE: CPT | Mod: 26 | Performed by: INTERNAL MEDICINE

## 2024-04-20 PROCEDURE — 999N000208 ECHOCARDIOGRAM COMPLETE

## 2024-04-20 PROCEDURE — 99232 SBSQ HOSP IP/OBS MODERATE 35: CPT | Mod: 25

## 2024-04-20 PROCEDURE — 84145 PROCALCITONIN (PCT): CPT

## 2024-04-20 PROCEDURE — 84484 ASSAY OF TROPONIN QUANT: CPT

## 2024-04-20 PROCEDURE — 36415 COLL VENOUS BLD VENIPUNCTURE: CPT

## 2024-04-20 PROCEDURE — 250N000013 HC RX MED GY IP 250 OP 250 PS 637

## 2024-04-20 PROCEDURE — 250N000011 HC RX IP 250 OP 636: Performed by: INTERNAL MEDICINE

## 2024-04-20 PROCEDURE — 250N000013 HC RX MED GY IP 250 OP 250 PS 637: Performed by: INTERNAL MEDICINE

## 2024-04-20 PROCEDURE — 120N000005 HC R&B MS OVERFLOW UMMC

## 2024-04-20 PROCEDURE — 85027 COMPLETE CBC AUTOMATED: CPT

## 2024-04-20 PROCEDURE — 82040 ASSAY OF SERUM ALBUMIN: CPT

## 2024-04-20 PROCEDURE — 80048 BASIC METABOLIC PNL TOTAL CA: CPT

## 2024-04-20 RX ORDER — MAGNESIUM OXIDE 400 MG/1
400 TABLET ORAL EVERY 4 HOURS
Status: COMPLETED | OUTPATIENT
Start: 2024-04-20 | End: 2024-04-20

## 2024-04-20 RX ORDER — POTASSIUM CHLORIDE 750 MG/1
40 TABLET, EXTENDED RELEASE ORAL ONCE
Status: COMPLETED | OUTPATIENT
Start: 2024-04-20 | End: 2024-04-20

## 2024-04-20 RX ORDER — CARVEDILOL 25 MG/1
25 TABLET ORAL 2 TIMES DAILY WITH MEALS
Status: DISCONTINUED | OUTPATIENT
Start: 2024-04-20 | End: 2024-04-21 | Stop reason: HOSPADM

## 2024-04-20 RX ADMIN — SPIRONOLACTONE 100 MG: 100 TABLET ORAL at 08:00

## 2024-04-20 RX ADMIN — HYDRALAZINE HYDROCHLORIDE 10 MG: 20 INJECTION INTRAMUSCULAR; INTRAVENOUS at 20:50

## 2024-04-20 RX ADMIN — LISINOPRIL 40 MG: 40 TABLET ORAL at 08:01

## 2024-04-20 RX ADMIN — HUMAN ALBUMIN MICROSPHERES AND PERFLUTREN 5 ML: 10; .22 INJECTION, SOLUTION INTRAVENOUS at 12:01

## 2024-04-20 RX ADMIN — CARVEDILOL 25 MG: 25 TABLET, FILM COATED ORAL at 09:59

## 2024-04-20 RX ADMIN — ACETAMINOPHEN 650 MG: 325 TABLET, FILM COATED ORAL at 12:31

## 2024-04-20 RX ADMIN — APIXABAN 5 MG: 5 TABLET, FILM COATED ORAL at 08:00

## 2024-04-20 RX ADMIN — MAGNESIUM OXIDE TAB 400 MG (241.3 MG ELEMENTAL MG) 400 MG: 400 (241.3 MG) TAB at 15:43

## 2024-04-20 RX ADMIN — BUMETANIDE 2 MG: 0.25 INJECTION INTRAMUSCULAR; INTRAVENOUS at 18:11

## 2024-04-20 RX ADMIN — ACETAMINOPHEN 650 MG: 325 TABLET, FILM COATED ORAL at 18:11

## 2024-04-20 RX ADMIN — MAGNESIUM OXIDE TAB 400 MG (241.3 MG ELEMENTAL MG) 400 MG: 400 (241.3 MG) TAB at 09:59

## 2024-04-20 RX ADMIN — POTASSIUM CHLORIDE 40 MEQ: 750 TABLET, EXTENDED RELEASE ORAL at 09:59

## 2024-04-20 RX ADMIN — OXYCODONE HYDROCHLORIDE 5 MG: 5 TABLET ORAL at 00:00

## 2024-04-20 RX ADMIN — AZITHROMYCIN DIHYDRATE 250 MG: 250 TABLET ORAL at 08:00

## 2024-04-20 RX ADMIN — EMPAGLIFLOZIN 10 MG: 10 TABLET, FILM COATED ORAL at 09:59

## 2024-04-20 RX ADMIN — BUMETANIDE 2 MG: 0.25 INJECTION INTRAMUSCULAR; INTRAVENOUS at 06:02

## 2024-04-20 RX ADMIN — CARVEDILOL 25 MG: 25 TABLET, FILM COATED ORAL at 18:11

## 2024-04-20 RX ADMIN — APIXABAN 5 MG: 5 TABLET, FILM COATED ORAL at 20:49

## 2024-04-20 RX ADMIN — HYDRALAZINE HYDROCHLORIDE 10 MG: 20 INJECTION INTRAMUSCULAR; INTRAVENOUS at 00:45

## 2024-04-20 RX ADMIN — ACETAMINOPHEN 650 MG: 325 TABLET, FILM COATED ORAL at 22:33

## 2024-04-20 ASSESSMENT — ACTIVITIES OF DAILY LIVING (ADL)
ADLS_ACUITY_SCORE: 37
ADLS_ACUITY_SCORE: 20
ADLS_ACUITY_SCORE: 37
ADLS_ACUITY_SCORE: 37
ADLS_ACUITY_SCORE: 20
ADLS_ACUITY_SCORE: 37
ADLS_ACUITY_SCORE: 20
ADLS_ACUITY_SCORE: 37
ADLS_ACUITY_SCORE: 20
ADLS_ACUITY_SCORE: 37
ADLS_ACUITY_SCORE: 20
ADLS_ACUITY_SCORE: 37
ADLS_ACUITY_SCORE: 20
ADLS_ACUITY_SCORE: 37

## 2024-04-20 NOTE — PROGRESS NOTES
Major Shift Events:       Neuro:  Neuro status intact.   Resp: Lungs diminished. Encouraged deep breathing and good respiratory toileting. Pt requesting RT to see him for a CPAP placement - RT called.   Cards: BP elevated. Bumex and Hydralazine given. Dr. Williamson updated on B/P 170's/110's. Trace edema in lower extremities.   GI/: 2 g Na diet with 2L H2O restrict. Voiding adequate amounts following diuretic. Contributing towards improvement with blood pressure.   Labs: Urine sent for UA/UC. Still needs respiratory sample to be sent.     Plan:  Continue to closely monitor.  For vital signs and complete assessments, please see documentation flowsheets.

## 2024-04-20 NOTE — PROGRESS NOTES
Shift: [id number]  Neuro:A&O X4, able to make needs known.   Respiratory: On CPAP overnight. On 2L NC. Shallow breathing/tachypnea noted.   Cardiac:Hypertensive 150's-160's. Hydralazine IV given x1.   GI/: Voiding adequately. 2 unmeasured urine events overnight even after educating on STRICT I&O's. No BM overnight.   Diet: 2g Na diet, 2L FR  Pain:Denies pain.   IV Access: R PIV.   Activity:Independent in room.     New changes this shift: None overnight.  Plan: Continue POC. Contact CARDS 1 with any questions/ concerns.

## 2024-04-20 NOTE — PROGRESS NOTES
Admission   Diagnosis: HF Exacerbation   Admitted from: [facility]   Via: stretcher   Accompanied by: family   Belongings: Placed in closet; valuables sent home with family   Admission paperwork: complete   Teaching: Call don't fall, use of console, meal times, visiting hours, orientation to unit, when to call for the RN (angina/sob/dizzyness, etc.), and the importance of safety.   Access: PIV   Telemetry:Placed on pt   Ht./Wt.: complete     Two nurse head-to-toe skin assessment performed by JERRY and ANDRES.  Skin issues noted: See PCS for assessment and treatment of wounds and surgical sites.

## 2024-04-20 NOTE — MEDICATION SCRIBE - ADMISSION MEDICATION HISTORY
Medication Scribe Admission Medication History    Admission medication history is complete. The information provided in this note is only as accurate as the sources available at the time of the update.    Information Source(s): Clinic records and Hospital records via  N/A    Pertinent Information:   In the notes section patient states that he has not taken his medication in 10 days. Also reported in the notes section by patient to provider that he has been out of his torsemide for 4-6 months as well as other medications  Patient's carvedilol 25 mg tablet take 1 tablet (25 mg) by mouth 2 times daily for 360 days ended on 4/17/2024    Changes made to PTA medication list:  Added: None  Deleted: carvedilol 25 MG tablet  Changed: None    Allergies reviewed with patient and updates made in EHR: yes    Medication History Completed By: Cecy Mario 4/19/2024 9:23 PM    PTA Med List   Medication Sig Last Dose    apixaban ANTICOAGULANT (ELIQUIS) 5 MG tablet Take 1 tablet (5 mg) by mouth 2 times daily 4/10/2024 at am/pm    lisinopril (ZESTRIL) 40 MG tablet Take 1 tablet (40 mg) by mouth daily 4/3/2024 at am    polyethylene glycol (MIRALAX) 17 GM/Dose powder Take 17 g by mouth daily 4/10/2024 at am    spironolactone (ALDACTONE) 100 MG tablet Take 1 tablet (100 mg) by mouth daily 4/10/2024 at am

## 2024-04-20 NOTE — PROGRESS NOTES
[facility]   Cardiology   Progress Note     ASSESSMENT/PLAN:  Miguel Ángel Wilson is a 33 year old year old with PMHx of Atrial flutter, HTN, CKD2, HFrEF who presents with acute on chronic HFrEF and uncontrolled hypertension.    # Acute on Chronic Systolic Heart Failure   # Uncontrolled HTN  Patient presented to ED 4/19 with shortness of breath and SpO2 88% on RA. He reported being out of his medications for the last 10 days. Workup in the ED notable for BNP 16,530, interstitial edema on CXR, Troponin 45 (past troponins 40s-80s), Procal 17.8, and WBC 15.9 (24.8). Per chart review, appears patient was first diagnosed with HF 2/2 to uncontrolled HTN in 2021.  - TTE 4/19: EF 30-35% with severe diffuse hypokinesis; IVC diameter and respiratory changes fall into an intermediate range suggesting an RA pressure of 8 mmHg  - Volume Status: Hypervolemic; Continue Bumex 2mg BID    EDW at last discharge 293lb vs weight on admission 308lb   Home Diuretic: Torsemide 20mg BID  - GDMT:    - BB: Resume PTA Coreg 25mg BID    - ACE/ARB/ARNI: Continue PTA lisinopril 40mg daily   - AA: continue PTA spironolactone 100mg daily    - SGLT2i: not on PTA, start Jardiance 10mg daily  - Strict I/Os  - Daily Weights  - Electrolyte replacement protocols ordered   - No ischemic evaluation to date per patient; plan for Lexiscan stress test as OP     # Concern for CAP  CXR with concern for CAP; elevated procalcitionin and WBC  - Empiric ceftriaxone + azithromycin started in ED, discontinued due to symptom resolution and lab improvement after diuresis  - Blood cultures with NGTD  - Sputum cultures ordered     # Hx Aflutter; CHADsVASc 2  NSR on admission  - Eliquis 5mg BID   - Telemetry     # DORY on CKD2  # Hypomagnesemia   # Hypokalemia  Cr on admission 1.72 which appears to be patient's baseline  - BID BMP with diuresis  - Electrolyte replacement protocols ordered     # DM2  Most recent A1c 6.7% (4/20/23); not on any  medications PTA  - Recheck A1c  - Start Jardiance as above    # PARVEEN  - CPAP ordered       FEN: 2g Na and 2L FR  Code status: Full Code   Prophylaxis:  DOAC  Isolation: None  Disposition: 1-2 days pending diuresis    Patient seen and discussed with Dr. Hatfield, who agrees with above plan.    Michelle Toscano, MS, PA-C  [facility] Cardiology Team  Pager [phone]/Vocera    I saw and evaluated Mr Wilson as part of a shared APRN/PA visit.    I personally reviewed the interval progress with diuresis.     Key management decisions made by me and carried out under my direction include: Continued HF therapy    Counseling and/or coordination of care performed by me:  Discussed strategies to avoid situations in whivch he has no meds at home with periods of failure to be able to remain adequately treated.   I spent 40 minutes face-to-face and/or coordinating care. Over 50% of the time on the unit was spent counseling the patient and/or coordinating care as documented above. Date of service 4/20/24 PM  YOGESH Hatfield MD      Interval History:  Patient reports feeling much better this morning. He reports that the pain he was feeling in his abdomen was more of a pressure/tightness sensation and has since resolved after significant UOP. He reports his breathing feels much better.     Physical Exam:  Temp:  [98.2  F (36.8  C)-98.8  F (37.1  C)] 98.8  F (37.1  C)  Pulse:  [] 95  Resp:  [20-32] 20  BP: (134-197)/() 151/108  SpO2:  [94 %-100 %] 94 %    I/O:   Intake/Output Summary (Last 24 hours) at 4/20/2024 0722  Last data filed at 4/19/2024 2359  Gross per 24 hour   Intake 340 ml   Output 4100 ml   Net -3760 ml     Wt:   Wt Readings from Last 5 Encounters:   04/20/24 139.9 kg (308 lb 8 oz)   04/23/23 133.1 kg (293 lb 8 oz)   03/10/22 140.2 kg (309 lb)   01/22/22 145.7 kg (321 lb 1.6 oz)     General: NAD  HEENT:  PERRLA, EOMI.   CV: RRR. No murmur appreciated. No rubs or gallops.  Resp: No increased work of breathing or use of accessory muscles,  breathing comfortably on room air.  Lung sounds slightly diminished bilaterally  Abdomen: Abdomen is soft. No distension, non-tender to palpation.    Extremities: Warm. 1+ pre-tibial edema. No cyanosis or clubbing.  Skin:  Warm and dry. No erythema, rashes, ulceration or diaphoresis.  Neuro: Alert and oriented x3.      Medications:  Current Facility-Administered Medications   Medication Dose Route Frequency Provider Last Rate Last Admin    apixaban ANTICOAGULANT (ELIQUIS) tablet 5 mg  5 mg Oral BID Artem Avelar MD   5 mg at 04/19/24 2009    azithromycin (ZITHROMAX) tablet 250 mg  250 mg Oral Daily Artem Avelar MD        bumetanide (BUMEX) injection 2 mg  2 mg Intravenous Q12H Artem Avelar MD   2 mg at 04/20/24 0602    [Held by provider] carvedilol (COREG) tablet 25 mg  25 mg Oral BID Artem Avelar MD        cefTRIAXone (ROCEPHIN) 1 g vial to attach to  mL bag for ADULTS or NS 50 mL bag for PEDS  1 g Intravenous Q24H Artem Avelar MD   Stopped at 04/19/24 2030    lisinopril (ZESTRIL) tablet 40 mg  40 mg Oral Daily Artem Avelar MD        polyethylene glycol (MIRALAX) Packet 17 g  17 g Oral Daily Artem Avelar MD        sodium chloride (PF) 0.9% PF flush 3 mL  3 mL Intracatheter Q8H Artem Avelar MD   3 mL at 04/20/24 0048    spironolactone (ALDACTONE) tablet 100 mg  100 mg Oral Daily Artem Avelar MD         Current Facility-Administered Medications   Medication Dose Route Frequency Provider Last Rate Last Admin    medication instruction   Does not apply Continuous PRN Artem Avelar MD           Labs:   CMP  Recent Labs   Lab 04/19/24  1406 04/19/24  1343   NA  --  135   POTASSIUM  --  3.7   CHLORIDE  --  96*   CO2  --  26   ANIONGAP  --  13   GLC  --  100*   BUN  --  18.5   CR 1.72* 1.72*   GFRESTIMATED 53* 53*   EFREM  --  8.7   MAG  --  1.4*   PROTTOTAL  --  6.9   ALBUMIN  --  3.8    BILITOTAL  --  2.0*   ALKPHOS  --  90   AST  --  31   ALT  --  20     CBC  Recent Labs   Lab 04/20/24  0644 04/19/24  1343   WBC 15.9* 24.8*   RBC 5.29 4.42   HGB 13.5 11.5*   HCT 42.6 35.8*   MCV 81 81   MCH 25.5* 26.0*   MCHC 31.7 32.1   RDW 17.2* 16.8*    194     INRNo lab results found in last 7 days.  Arterial Blood GasNo lab results found in last 7 days.    Diagnostics:  ECG 4/19/24:      Echo 4/19/24:  Interpretation Summary  Left ventricular size is normal.  The visual ejection fraction is 30-35%.  Right ventricular function, chamber size, wall motion, and thickness are  normal.  Right ventricular systolic pressure is 39mmHg above the right atrial pressure.  IVC diameter and respiratory changes fall into an intermediate range  suggesting an RA pressure of 8 mmHg.  No pericardial effusion is present.  ______________________________________________________________________________  Left Ventricle  Left ventricular size is normal. Mild to moderate concentric wall thickening  consistent with left ventricular hypertrophy is present. The visual ejection  fraction is 30-35%. Left ventricular diastolic function is indeterminate.  Severe diffuse hypokinesis is present.     Right Ventricle  Right ventricular function, chamber size, wall motion, and thickness are  normal.     Atria  The right atria appears normal. Severe left atrial enlargement is present.     Mitral Valve  The mitral valve is normal. Trace mitral insufficiency is present.     Aortic Valve  The valve leaflets are not well visualized. On Doppler interrogation, there is  no significant stenosis or regurgitation.     Tricuspid Valve  The tricuspid valve is normal. Trace to mild tricuspid insufficiency is  present. Right ventricular systolic pressure is 39mmHg above the right atrial  pressure.     Pulmonic Valve  The pulmonic valve is normal. Trace pulmonic insufficiency is present.     Vessels  The thoracic aorta is normal. The pulmonary artery  and bifurcation cannot be  assessed. IVC diameter and respiratory changes fall into an intermediate range  suggesting an RA pressure of 8 mmHg.     Pericardium  No pericardial effusion is present.     ______________________________________________________________________________  MMode/2D Measurements & Calculations  IVSd: 1.7 cm  LVIDd: 5.3 cm  LVIDs: 4.4 cm  LVPWd: 1.5 cm     FS: 17.7 %  LV mass(C)d: 379.4 grams  LV mass(C)dI: 147.2 grams/m2  Ao root diam: 4.0 cm  asc Aorta Diam: 3.6 cm  LVOT diam: 2.4 cm  LVOT area: 4.6 cm2  LA Volume (BP): 129.0 ml  LA Volume Index (BP): 50.0 ml/m2  RWT: 0.55  TAPSE: 1.8 cm     Doppler Measurements & Calculations  MV E max omar: 78.8 cm/sec  MV A max omar: 53.1 cm/sec  MV E/A: 1.5  MV dec slope: 631.0 cm/sec2  MV dec time: 0.13 sec  PA acc time: 0.14 sec  TR max omar: 312.7 cm/sec  TR max P.1 mmHg  E/E' av.8  Lateral E/e': 10.1  Medial E/e': 13.6  RV S Omar: 12.2 cm/sec         Recent Results (from the past 24 hour(s))   XR Chest Port 1 View    Narrative    Portable chest 2024 at 1558 hours    FINDINGS: Shoulder there    COMPARISON: 2023    FINDINGS: Mild perihilar vascular crowding noted which may indicate  mild peribronchial cuffing pulmonary venous congestion suggestive of  mild positive volume balance. Heart is mildly enlarged. Costophrenic  angles appear sharp. No consolidations. Atherosclerotic calcifications  in the thoracic aorta.      Impression    IMPRESSION: Subtle positive fluid volume balance.    BARRETT GUERRERO MD         SYSTEM ID:  [id number]       Medical Decision Making       60 MINUTES SPENT BY ME on the date of service doing chart review, history, exam, documentation & further activities per the note.

## 2024-04-20 NOTE — PLAN OF CARE
Neuro: A&Ox4.   Cardiac: SR. VSS.   Respiratory: Sating>90% on RA.  GI/: Adequate urine output. BM X1  Diet/appetite: Tolerating diet. Eating well.  Activity:  Up ad [place], up to chair and in halls.  Pain: At acceptable level on current regimen.   Skin: No new deficits noted.  LDA's: R PIV SL    Plan: Continue with POC. Notify primary team with changes. Probable discharge tomorrow morning after diuresis.

## 2024-04-21 ENCOUNTER — HOSPITAL ENCOUNTER (INPATIENT)
Facility: CLINIC | Age: 34
LOS: 1 days | Discharge: LEFT BEFORE DISCHARGE | End: 2024-04-22
Attending: EMERGENCY MEDICINE | Admitting: INTERNAL MEDICINE
Payer: COMMERCIAL

## 2024-04-21 ENCOUNTER — APPOINTMENT (OUTPATIENT)
Dept: GENERAL RADIOLOGY | Facility: CLINIC | Age: 34
End: 2024-04-21
Attending: EMERGENCY MEDICINE
Payer: COMMERCIAL

## 2024-04-21 VITALS
DIASTOLIC BLOOD PRESSURE: 121 MMHG | SYSTOLIC BLOOD PRESSURE: 154 MMHG | HEIGHT: 74 IN | TEMPERATURE: 97.8 F | HEART RATE: 84 BPM | OXYGEN SATURATION: 92 % | RESPIRATION RATE: 18 BRPM | WEIGHT: 308.5 LBS | BODY MASS INDEX: 39.59 KG/M2

## 2024-04-21 DIAGNOSIS — R09.02 HYPOXIA: ICD-10-CM

## 2024-04-21 DIAGNOSIS — I50.23 HYPERTENSIVE HEART DISEASE WITH ACUTE ON CHRONIC SYSTOLIC CONGESTIVE HEART FAILURE (H): Primary | ICD-10-CM

## 2024-04-21 DIAGNOSIS — I11.0 HYPERTENSIVE HEART DISEASE WITH ACUTE ON CHRONIC SYSTOLIC CONGESTIVE HEART FAILURE (H): Primary | ICD-10-CM

## 2024-04-21 DIAGNOSIS — F12.90 USE OF CANNABINOID EDIBLES: ICD-10-CM

## 2024-04-21 DIAGNOSIS — R11.0 NAUSEA: ICD-10-CM

## 2024-04-21 DIAGNOSIS — I50.23 ACUTE ON CHRONIC SYSTOLIC HEART FAILURE (H): ICD-10-CM

## 2024-04-21 DIAGNOSIS — R06.02 SHORTNESS OF BREATH: ICD-10-CM

## 2024-04-21 DIAGNOSIS — R60.0 LOCALIZED EDEMA: ICD-10-CM

## 2024-04-21 DIAGNOSIS — I16.0 HYPERTENSIVE URGENCY: ICD-10-CM

## 2024-04-21 LAB
ALBUMIN SERPL BCG-MCNC: 4.1 G/DL (ref 3.5–5.2)
ALP SERPL-CCNC: 96 U/L (ref 40–150)
ALT SERPL W P-5'-P-CCNC: 48 U/L (ref 0–70)
AMPHETAMINES UR QL SCN: ABNORMAL
ANION GAP SERPL CALCULATED.3IONS-SCNC: 12 MMOL/L (ref 7–15)
ANION GAP SERPL CALCULATED.3IONS-SCNC: 12 MMOL/L (ref 7–15)
AST SERPL W P-5'-P-CCNC: ABNORMAL U/L
BARBITURATES UR QL SCN: ABNORMAL
BASOPHILS # BLD AUTO: 0 10E3/UL (ref 0–0.2)
BASOPHILS NFR BLD AUTO: 1 %
BENZODIAZ UR QL SCN: ABNORMAL
BILIRUB SERPL-MCNC: 0.7 MG/DL
BUN SERPL-MCNC: 19.2 MG/DL (ref 6–20)
BUN SERPL-MCNC: 23.1 MG/DL (ref 6–20)
BZE UR QL SCN: ABNORMAL
CALCIUM SERPL-MCNC: 9.3 MG/DL (ref 8.6–10)
CALCIUM SERPL-MCNC: 9.5 MG/DL (ref 8.6–10)
CANNABINOIDS UR QL SCN: ABNORMAL
CHLORIDE SERPL-SCNC: 95 MMOL/L (ref 98–107)
CHLORIDE SERPL-SCNC: 99 MMOL/L (ref 98–107)
CREAT SERPL-MCNC: 1.5 MG/DL (ref 0.67–1.17)
CREAT SERPL-MCNC: 1.77 MG/DL (ref 0.67–1.17)
DEPRECATED HCO3 PLAS-SCNC: 29 MMOL/L (ref 22–29)
DEPRECATED HCO3 PLAS-SCNC: 30 MMOL/L (ref 22–29)
EGFRCR SERPLBLD CKD-EPI 2021: 52 ML/MIN/1.73M2
EGFRCR SERPLBLD CKD-EPI 2021: 63 ML/MIN/1.73M2
EOSINOPHIL # BLD AUTO: 0.1 10E3/UL (ref 0–0.7)
EOSINOPHIL NFR BLD AUTO: 1 %
ERYTHROCYTE [DISTWIDTH] IN BLOOD BY AUTOMATED COUNT: 16.8 % (ref 10–15)
ERYTHROCYTE [DISTWIDTH] IN BLOOD BY AUTOMATED COUNT: 17.2 % (ref 10–15)
FENTANYL UR QL: ABNORMAL
GLUCOSE SERPL-MCNC: 104 MG/DL (ref 70–99)
GLUCOSE SERPL-MCNC: 122 MG/DL (ref 70–99)
HCT VFR BLD AUTO: 45.6 % (ref 40–53)
HCT VFR BLD AUTO: 45.8 % (ref 40–53)
HGB BLD-MCNC: 14.1 G/DL (ref 13.3–17.7)
HGB BLD-MCNC: 14.1 G/DL (ref 13.3–17.7)
HOLD SPECIMEN: NORMAL
IMM GRANULOCYTES # BLD: 0 10E3/UL
IMM GRANULOCYTES NFR BLD: 0 %
LYMPHOCYTES # BLD AUTO: 1.1 10E3/UL (ref 0.8–5.3)
LYMPHOCYTES NFR BLD AUTO: 15 %
MAGNESIUM SERPL-MCNC: 1.8 MG/DL (ref 1.7–2.3)
MCH RBC QN AUTO: 25.1 PG (ref 26.5–33)
MCH RBC QN AUTO: 25.2 PG (ref 26.5–33)
MCHC RBC AUTO-ENTMCNC: 30.8 G/DL (ref 31.5–36.5)
MCHC RBC AUTO-ENTMCNC: 30.9 G/DL (ref 31.5–36.5)
MCV RBC AUTO: 82 FL (ref 78–100)
MCV RBC AUTO: 82 FL (ref 78–100)
MONOCYTES # BLD AUTO: 0.9 10E3/UL (ref 0–1.3)
MONOCYTES NFR BLD AUTO: 12 %
NEUTROPHILS # BLD AUTO: 5.2 10E3/UL (ref 1.6–8.3)
NEUTROPHILS NFR BLD AUTO: 71 %
NRBC # BLD AUTO: 0 10E3/UL
NRBC BLD AUTO-RTO: 0 /100
NT-PROBNP SERPL-MCNC: 1481 PG/ML (ref 0–450)
OPIATES UR QL SCN: ABNORMAL
PCP QUAL URINE (ROCHE): ABNORMAL
PLATELET # BLD AUTO: 188 10E3/UL (ref 150–450)
PLATELET # BLD AUTO: 239 10E3/UL (ref 150–450)
POTASSIUM SERPL-SCNC: 3.4 MMOL/L (ref 3.4–5.3)
POTASSIUM SERPL-SCNC: 3.9 MMOL/L (ref 3.4–5.3)
PROT SERPL-MCNC: 8.1 G/DL (ref 6.4–8.3)
RBC # BLD AUTO: 5.59 10E6/UL (ref 4.4–5.9)
RBC # BLD AUTO: 5.61 10E6/UL (ref 4.4–5.9)
SODIUM SERPL-SCNC: 137 MMOL/L (ref 135–145)
SODIUM SERPL-SCNC: 140 MMOL/L (ref 135–145)
TROPONIN T SERPL HS-MCNC: 48 NG/L
WBC # BLD AUTO: 7.3 10E3/UL (ref 4–11)
WBC # BLD AUTO: 8 10E3/UL (ref 4–11)

## 2024-04-21 PROCEDURE — 250N000011 HC RX IP 250 OP 636: Performed by: INTERNAL MEDICINE

## 2024-04-21 PROCEDURE — 96376 TX/PRO/DX INJ SAME DRUG ADON: CPT | Performed by: EMERGENCY MEDICINE

## 2024-04-21 PROCEDURE — 120N000002 HC R&B MED SURG/OB UMMC

## 2024-04-21 PROCEDURE — 250N000013 HC RX MED GY IP 250 OP 250 PS 637: Performed by: EMERGENCY MEDICINE

## 2024-04-21 PROCEDURE — 36415 COLL VENOUS BLD VENIPUNCTURE: CPT | Performed by: EMERGENCY MEDICINE

## 2024-04-21 PROCEDURE — 250N000011 HC RX IP 250 OP 636: Performed by: EMERGENCY MEDICINE

## 2024-04-21 PROCEDURE — 99285 EMERGENCY DEPT VISIT HI MDM: CPT | Mod: 25 | Performed by: EMERGENCY MEDICINE

## 2024-04-21 PROCEDURE — 85025 COMPLETE CBC W/AUTO DIFF WBC: CPT

## 2024-04-21 PROCEDURE — 93010 ELECTROCARDIOGRAM REPORT: CPT | Performed by: EMERGENCY MEDICINE

## 2024-04-21 PROCEDURE — 71045 X-RAY EXAM CHEST 1 VIEW: CPT

## 2024-04-21 PROCEDURE — 80048 BASIC METABOLIC PNL TOTAL CA: CPT

## 2024-04-21 PROCEDURE — 93005 ELECTROCARDIOGRAM TRACING: CPT | Performed by: EMERGENCY MEDICINE

## 2024-04-21 PROCEDURE — 84484 ASSAY OF TROPONIN QUANT: CPT | Performed by: EMERGENCY MEDICINE

## 2024-04-21 PROCEDURE — 80307 DRUG TEST PRSMV CHEM ANLYZR: CPT | Performed by: EMERGENCY MEDICINE

## 2024-04-21 PROCEDURE — 36415 COLL VENOUS BLD VENIPUNCTURE: CPT

## 2024-04-21 PROCEDURE — 99239 HOSP IP/OBS DSCHRG MGMT >30: CPT | Mod: 25

## 2024-04-21 PROCEDURE — 83735 ASSAY OF MAGNESIUM: CPT

## 2024-04-21 PROCEDURE — 71045 X-RAY EXAM CHEST 1 VIEW: CPT | Mod: 26 | Performed by: STUDENT IN AN ORGANIZED HEALTH CARE EDUCATION/TRAINING PROGRAM

## 2024-04-21 PROCEDURE — 84155 ASSAY OF PROTEIN SERUM: CPT | Performed by: EMERGENCY MEDICINE

## 2024-04-21 PROCEDURE — 250N000013 HC RX MED GY IP 250 OP 250 PS 637

## 2024-04-21 PROCEDURE — 96374 THER/PROPH/DIAG INJ IV PUSH: CPT | Performed by: EMERGENCY MEDICINE

## 2024-04-21 PROCEDURE — 83880 ASSAY OF NATRIURETIC PEPTIDE: CPT | Performed by: EMERGENCY MEDICINE

## 2024-04-21 PROCEDURE — 250N000013 HC RX MED GY IP 250 OP 250 PS 637: Performed by: INTERNAL MEDICINE

## 2024-04-21 PROCEDURE — 99291 CRITICAL CARE FIRST HOUR: CPT | Mod: 25 | Performed by: EMERGENCY MEDICINE

## 2024-04-21 PROCEDURE — 85027 COMPLETE CBC AUTOMATED: CPT | Performed by: EMERGENCY MEDICINE

## 2024-04-21 PROCEDURE — 250N000011 HC RX IP 250 OP 636

## 2024-04-21 RX ORDER — CALCIUM CARBONATE 500 MG/1
1000 TABLET, CHEWABLE ORAL 4 TIMES DAILY PRN
Status: DISCONTINUED | OUTPATIENT
Start: 2024-04-21 | End: 2024-04-22 | Stop reason: HOSPADM

## 2024-04-21 RX ORDER — ACETAMINOPHEN 325 MG/1
650 TABLET ORAL EVERY 4 HOURS PRN
Status: DISCONTINUED | OUTPATIENT
Start: 2024-04-21 | End: 2024-04-22 | Stop reason: HOSPADM

## 2024-04-21 RX ORDER — AMOXICILLIN 250 MG
1 CAPSULE ORAL 2 TIMES DAILY PRN
Status: DISCONTINUED | OUTPATIENT
Start: 2024-04-21 | End: 2024-04-22 | Stop reason: HOSPADM

## 2024-04-21 RX ORDER — CARVEDILOL 25 MG/1
25 TABLET ORAL 2 TIMES DAILY WITH MEALS
Qty: 180 TABLET | Refills: 3 | Status: SHIPPED | OUTPATIENT
Start: 2024-04-21

## 2024-04-21 RX ORDER — HYDRALAZINE HYDROCHLORIDE 20 MG/ML
5 INJECTION INTRAMUSCULAR; INTRAVENOUS ONCE
Status: COMPLETED | OUTPATIENT
Start: 2024-04-21 | End: 2024-04-21

## 2024-04-21 RX ORDER — POTASSIUM CHLORIDE 750 MG/1
20 TABLET, EXTENDED RELEASE ORAL DAILY
Status: DISCONTINUED | OUTPATIENT
Start: 2024-04-22 | End: 2024-04-22 | Stop reason: HOSPADM

## 2024-04-21 RX ORDER — CARVEDILOL 25 MG/1
25 TABLET ORAL ONCE
Status: COMPLETED | OUTPATIENT
Start: 2024-04-21 | End: 2024-04-21

## 2024-04-21 RX ORDER — AMOXICILLIN 250 MG
2 CAPSULE ORAL 2 TIMES DAILY PRN
Status: DISCONTINUED | OUTPATIENT
Start: 2024-04-21 | End: 2024-04-22 | Stop reason: HOSPADM

## 2024-04-21 RX ORDER — SPIRONOLACTONE 100 MG/1
100 TABLET, FILM COATED ORAL ONCE
Qty: 1 TABLET | Refills: 0 | Status: DISCONTINUED | OUTPATIENT
Start: 2024-04-21 | End: 2024-04-21

## 2024-04-21 RX ORDER — CARVEDILOL 25 MG/1
25 TABLET ORAL ONCE
Status: DISCONTINUED | OUTPATIENT
Start: 2024-04-21 | End: 2024-04-21

## 2024-04-21 RX ORDER — LIDOCAINE 40 MG/G
CREAM TOPICAL
Status: DISCONTINUED | OUTPATIENT
Start: 2024-04-21 | End: 2024-04-22 | Stop reason: HOSPADM

## 2024-04-21 RX ORDER — HYDRALAZINE HYDROCHLORIDE 20 MG/ML
10 INJECTION INTRAMUSCULAR; INTRAVENOUS ONCE
Status: COMPLETED | OUTPATIENT
Start: 2024-04-21 | End: 2024-04-21

## 2024-04-21 RX ORDER — LISINOPRIL 40 MG/1
40 TABLET ORAL DAILY
Status: DISCONTINUED | OUTPATIENT
Start: 2024-04-22 | End: 2024-04-22 | Stop reason: HOSPADM

## 2024-04-21 RX ORDER — POTASSIUM CHLORIDE 1500 MG/1
20 TABLET, EXTENDED RELEASE ORAL DAILY
Qty: 90 TABLET | Refills: 3 | Status: SHIPPED | OUTPATIENT
Start: 2024-04-21

## 2024-04-21 RX ORDER — SPIRONOLACTONE 100 MG/1
100 TABLET, FILM COATED ORAL DAILY
Status: DISCONTINUED | OUTPATIENT
Start: 2024-04-22 | End: 2024-04-22 | Stop reason: HOSPADM

## 2024-04-21 RX ORDER — CARVEDILOL 25 MG/1
25 TABLET ORAL 2 TIMES DAILY WITH MEALS
Status: DISCONTINUED | OUTPATIENT
Start: 2024-04-22 | End: 2024-04-22 | Stop reason: HOSPADM

## 2024-04-21 RX ORDER — POLYETHYLENE GLYCOL 3350 17 G/17G
17 POWDER, FOR SOLUTION ORAL DAILY
Status: DISCONTINUED | OUTPATIENT
Start: 2024-04-22 | End: 2024-04-22 | Stop reason: HOSPADM

## 2024-04-21 RX ORDER — POTASSIUM CHLORIDE 750 MG/1
20 TABLET, EXTENDED RELEASE ORAL DAILY
Status: DISCONTINUED | OUTPATIENT
Start: 2024-04-21 | End: 2024-04-21 | Stop reason: HOSPADM

## 2024-04-21 RX ORDER — LISINOPRIL 40 MG/1
40 TABLET ORAL ONCE
Qty: 1 TABLET | Refills: 0 | Status: DISCONTINUED | OUTPATIENT
Start: 2024-04-21 | End: 2024-04-21

## 2024-04-21 RX ORDER — TORSEMIDE 20 MG/1
20 TABLET ORAL ONCE
Qty: 1 TABLET | Refills: 0 | Status: COMPLETED | OUTPATIENT
Start: 2024-04-21 | End: 2024-04-21

## 2024-04-21 RX ORDER — TORSEMIDE 20 MG/1
20 TABLET ORAL
Status: DISCONTINUED | OUTPATIENT
Start: 2024-04-22 | End: 2024-04-22 | Stop reason: HOSPADM

## 2024-04-21 RX ADMIN — SPIRONOLACTONE 100 MG: 100 TABLET ORAL at 08:04

## 2024-04-21 RX ADMIN — APIXABAN 5 MG: 5 TABLET, FILM COATED ORAL at 22:15

## 2024-04-21 RX ADMIN — CARVEDILOL 25 MG: 25 TABLET, FILM COATED ORAL at 08:04

## 2024-04-21 RX ADMIN — LISINOPRIL 40 MG: 40 TABLET ORAL at 08:04

## 2024-04-21 RX ADMIN — CARVEDILOL 25 MG: 25 TABLET, FILM COATED ORAL at 17:55

## 2024-04-21 RX ADMIN — POTASSIUM CHLORIDE 20 MEQ: 750 TABLET, EXTENDED RELEASE ORAL at 09:21

## 2024-04-21 RX ADMIN — HYDRALAZINE HYDROCHLORIDE 5 MG: 20 INJECTION INTRAMUSCULAR; INTRAVENOUS at 17:57

## 2024-04-21 RX ADMIN — EMPAGLIFLOZIN 10 MG: 10 TABLET, FILM COATED ORAL at 08:04

## 2024-04-21 RX ADMIN — NICARDIPINE HYDROCHLORIDE 2.5 MG/HR: 0.2 INJECTION, SOLUTION INTRAVENOUS at 21:48

## 2024-04-21 RX ADMIN — NICARDIPINE HYDROCHLORIDE 2.5 MG/HR: 0.2 INJECTION, SOLUTION INTRAVENOUS at 21:56

## 2024-04-21 RX ADMIN — APIXABAN 5 MG: 5 TABLET, FILM COATED ORAL at 08:04

## 2024-04-21 RX ADMIN — BUMETANIDE 2 MG: 0.25 INJECTION INTRAMUSCULAR; INTRAVENOUS at 05:45

## 2024-04-21 RX ADMIN — HYDRALAZINE HYDROCHLORIDE 10 MG: 20 INJECTION INTRAMUSCULAR; INTRAVENOUS at 18:57

## 2024-04-21 RX ADMIN — TORSEMIDE 20 MG: 20 TABLET ORAL at 17:56

## 2024-04-21 ASSESSMENT — ACTIVITIES OF DAILY LIVING (ADL)
ADLS_ACUITY_SCORE: 37
ADLS_ACUITY_SCORE: 20
ADLS_ACUITY_SCORE: 20
ADLS_ACUITY_SCORE: 37
ADLS_ACUITY_SCORE: 20
ADLS_ACUITY_SCORE: 20
ADLS_ACUITY_SCORE: 37
ADLS_ACUITY_SCORE: 37
ADLS_ACUITY_SCORE: 20
ADLS_ACUITY_SCORE: 20
ADLS_ACUITY_SCORE: 37
ADLS_ACUITY_SCORE: 20
ADLS_ACUITY_SCORE: 20
ADLS_ACUITY_SCORE: 37
ADLS_ACUITY_SCORE: 37
ADLS_ACUITY_SCORE: 20

## 2024-04-21 NOTE — ED PROVIDER NOTES
"ED Provider Note  [facility]      History     Chief Complaint   Patient presents with    Shortness of Breath     HPI    Miguel Ángel Wilson is a 33 year old male who has a history of chronic heart failure, DM2, uncontrolled hypertension, history of atrial flutter, recent admission on 4/19/2024 for acute on chronic systolic heart failure with uncontrolled hypertension and need for diuresis, was just admitted on the cardiology service and discharged earlier today (against medical advice).  Recent TTE from 4/19/2024 shows EF of 30 to 35% with diffuse hypokinesis.  It appears that the patient wanted to discharge a little bit sooner than the service thought he should, but it is noteworthy that the discharge note states that the patient was adamant about discharging and that his ride was already coming to pick him up and he felt better.    According to the triage note, he went home, took THC edibles and over the next hour became short of breath and hypertensive to 190/110.  He says he did not get any of his home medications today.  Per EMS glucose was 156, patient was given 2.5 mg of droperidol en route for nausea.    Patient is having difficulty giving much of the history and is quite irritable when asked questions.  He states he got home today about 0930.  He did not  any of his medications from the pharmacy upon discharge.  He has not taken any of his regular medications today.  He then took 5 marijuana edibles and \"blacked out \".  He does not know what happened after that.  He believes his mother called 911.  He is complaining of shortness of breath, but denies chest pain, denies any palpitations, denies any abdominal pain.  He did have some nausea for which he received droperidol and route from EMS.  No vomiting, no diarrhea.  No leg swelling.    According to the documentation from the hospital this morning, the patient did receive his lisinopril and spironolactone prior to discharge this " morning.    Further record review shows that the patient does frequently leave the hospital early or threatened to leave AMA and is high risk for noncompliance with treatment.    Past Medical History:   Diagnosis Date    Atrial flutter with rapid ventricular response (H) 1/31/2022    Essential hypertension 1/31/2022    Hypertension     Morbid obesity (H) 1/31/2022    Tobacco use 1/31/2022       Past Surgical History:   Procedure Laterality Date    IR RENAL ANGIOGRAM LEFT  4/19/2023    IR RENAL ANGIOGRAM LEFT  4/24/2023    IR RENAL EMBOLIZATION  4/19/2023       No family history on file.    Social History     Tobacco Use    Smoking status: Never     Passive exposure: Never    Smokeless tobacco: Never   Substance Use Topics    Alcohol use: No         Past Medical History  Past Medical History:   Diagnosis Date    Atrial flutter with rapid ventricular response (H) 1/31/2022    Essential hypertension 1/31/2022    Hypertension     Morbid obesity (H) 1/31/2022    Tobacco use 1/31/2022     Past Surgical History:   Procedure Laterality Date    IR RENAL ANGIOGRAM LEFT  4/19/2023    IR RENAL ANGIOGRAM LEFT  4/24/2023    IR RENAL EMBOLIZATION  4/19/2023     apixaban ANTICOAGULANT (ELIQUIS) 5 MG tablet  carvedilol (COREG) 25 MG tablet  empagliflozin (JARDIANCE) 10 MG TABS tablet  lisinopril (ZESTRIL) 40 MG tablet  polyethylene glycol (MIRALAX) 17 GM/Dose powder  potassium chloride nicole ER (KLOR-CON M20) 20 MEQ CR tablet  spironolactone (ALDACTONE) 100 MG tablet  torsemide (DEMADEX) 20 MG tablet      No Known Allergies  Family History  No family history on file.  Social History   Social History     Tobacco Use    Smoking status: Never     Passive exposure: Never    Smokeless tobacco: Never   Vaping Use    Vaping status: Never Used   Substance Use Topics    Alcohol use: No    Drug use: No         A medically appropriate review of systems was performed with pertinent positives and negatives noted in the HPI, and all other systems  negative.    Physical Exam   BP: (!) 172/114  Pulse: 96  Temp: 98  F (36.7  C)  Resp: 22  SpO2: 98 %  Physical Exam  Vitals and nursing note reviewed.   Constitutional:       General: He is not in acute distress.     Appearance: He is not diaphoretic.      Comments: Adult male, obese, irritable, not overly cooperative with answering questions   HENT:      Head: Atraumatic.      Mouth/Throat:      Mouth: Mucous membranes are moist.      Pharynx: Oropharynx is clear. No oropharyngeal exudate.      Comments: Poor dention  Eyes:      General: No scleral icterus.     Pupils: Pupils are equal, round, and reactive to light.   Cardiovascular:      Rate and Rhythm: Normal rate.      Pulses: Normal pulses.      Heart sounds: No murmur heard.  Pulmonary:      Effort: No respiratory distress.      Comments: Somewhat diminished breath sounds bilaterally, no wheezing, rhonchi, or crackles appreciated  Abdominal:      General: Bowel sounds are normal.      Palpations: Abdomen is soft.      Tenderness: There is no abdominal tenderness.      Comments: Obese, soft, nontender to palpation   Musculoskeletal:         General: No tenderness.      Right lower leg: Edema present.      Left lower leg: Edema present.   Skin:     General: Skin is warm.      Findings: No rash.   Psychiatric:      Comments: Irritable, somewhat confused (did receive some droperidol from EMS and route to the ED) not overly cooperative with answering questions           ED Course, Procedures, & Data      Procedures         EKG Interpretation:      Interpreted by Beti Rodríguez MD  Time reviewed:1715   Symptoms at time of EKG: None   Rhythm: Normal sinus   Rate: Normal  Axis: Normal  Ectopy: None  Conduction: Normal  ST Segments/ T Waves: ST depression noted in lead I, II, V5, and V6.  0.5 to 1 mm ST elevation in aVR.  T wave inversion noted in V6, lead I, lead II, aVL, all consistent with previous  Q Waves: None  Comparison to prior: No old EKG  available    Clinical Impression: no acute changes           Results for orders placed or performed during the hospital encounter of 04/21/24   XR Chest Port 1 View     Status: None    Narrative    Exam: XR CHEST PORT 1 VIEW, 4/21/2024 5:26 PM    Indication: SOB, hx of CHF    Comparison: 4/19/2024    Findings:   Portable semiupright chest radiograph. Normal trachea. Cardiomegaly,  normal central vasculature. No pneumothorax or pleural effusion. No  dense consolidation. Mild streaky interstitial opacities in the  perihilar regions. Normal upper abdomen. No displaced fracture.      Impression    Impression:     Cardiomegaly with mild atelectasis. No acute cardiopulmonary  abnormality suspected.     I have personally reviewed the examination and initial interpretation  and I agree with the findings.    PRANAY MUNIZ DO         SYSTEM ID:  [id number]   [place] Draw     Status: None    Narrative    The following orders were created for panel order [place] Draw.  Procedure                               Abnormality         Status                     ---------                               -----------         ------                     Extra Blue Top Tube[[id number]]                              Final result               Extra Red Top Tube[[id number]]                               Final result               Extra Green Top (Lithium...[[id number]]                      Final result               Extra Purple Top Tube[[id number]]                            Final result                 Please view results for these tests on the individual orders.   Extra Blue Top Tube     Status: None   Result Value Ref Range    Hold Specimen JIC    Extra Red Top Tube     Status: None   Result Value Ref Range    Hold Specimen JIC    Extra Green Top (Lithium Heparin) Tube     Status: None   Result Value Ref Range    Hold Specimen JIC    Extra Purple Top Tube     Status: None   Result Value Ref Range    Hold Specimen JIC    Nt probnp inpatient      Status: Abnormal   Result Value Ref Range    N terminal Pro BNP Inpatient 1,481 (H) 0 - 450 pg/mL   Troponin T, High Sensitivity     Status: Abnormal   Result Value Ref Range    Troponin T, High Sensitivity 48 (H) <=22 ng/L   Comprehensive metabolic panel     Status: Abnormal   Result Value Ref Range    Sodium 137 135 - 145 mmol/L    Potassium 3.9 3.4 - 5.3 mmol/L    Carbon Dioxide (CO2) 30 (H) 22 - 29 mmol/L    Anion Gap 12 7 - 15 mmol/L    Urea Nitrogen 23.1 (H) 6.0 - 20.0 mg/dL    Creatinine 1.77 (H) 0.67 - 1.17 mg/dL    GFR Estimate 52 (L) >60 mL/min/1.73m2    Calcium 9.5 8.6 - 10.0 mg/dL    Chloride 95 (L) 98 - 107 mmol/L    Glucose 122 (H) 70 - 99 mg/dL    Alkaline Phosphatase 96 40 - 150 U/L    AST      ALT 48 0 - 70 U/L    Protein Total 8.1 6.4 - 8.3 g/dL    Albumin 4.1 3.5 - 5.2 g/dL    Bilirubin Total 0.7 <=1.2 mg/dL   CBC with platelets and differential     Status: Abnormal   Result Value Ref Range    WBC Count 7.3 4.0 - 11.0 10e3/uL    RBC Count 5.61 4.40 - 5.90 10e6/uL    Hemoglobin 14.1 13.3 - 17.7 g/dL    Hematocrit 45.8 40.0 - 53.0 %    MCV 82 78 - 100 fL    MCH 25.1 (L) 26.5 - 33.0 pg    MCHC 30.8 (L) 31.5 - 36.5 g/dL    RDW 17.2 (H) 10.0 - 15.0 %    Platelet Count 239 150 - 450 10e3/uL    % Neutrophils 71 %    % Lymphocytes 15 %    % Monocytes 12 %    % Eosinophils 1 %    % Basophils 1 %    % Immature Granulocytes 0 %    NRBCs per 100 WBC 0 <1 /100    Absolute Neutrophils 5.2 1.6 - 8.3 10e3/uL    Absolute Lymphocytes 1.1 0.8 - 5.3 10e3/uL    Absolute Monocytes 0.9 0.0 - 1.3 10e3/uL    Absolute Eosinophils 0.1 0.0 - 0.7 10e3/uL    Absolute Basophils 0.0 0.0 - 0.2 10e3/uL    Absolute Immature Granulocytes 0.0 <=0.4 10e3/uL    Absolute NRBCs 0.0 10e3/uL   Urine Drug Screen Panel     Status: Abnormal   Result Value Ref Range    Amphetamines Urine Screen Negative Screen Negative    Barbituates Urine Screen Negative Screen Negative    Benzodiazepine Urine Screen Negative Screen Negative    Cannabinoids  Urine Screen Positive (A) Screen Negative    Cocaine Urine Screen Negative Screen Negative    Fentanyl Qual Urine Screen Negative Screen Negative    Opiates Urine Screen Negative Screen Negative    PCP Urine Screen Negative Screen Negative   EKG 12-lead, tracing only     Status: None (Preliminary result)   Result Value Ref Range    Systolic Blood Pressure  mmHg    Diastolic Blood Pressure  mmHg    Ventricular Rate 95 BPM    Atrial Rate 95 BPM    NY Interval 192 ms    QRS Duration 94 ms     ms    QTc 477 ms    P Axis 51 degrees    R AXIS 29 degrees    T Axis 171 degrees    Interpretation ECG       Sinus rhythm  Biatrial enlargement  Left ventricular hypertrophy with repolarization abnormality  Abnormal ECG     CBC with Platelets & Differential     Status: Abnormal    Narrative    The following orders were created for panel order CBC with Platelets & Differential.  Procedure                               Abnormality         Status                     ---------                               -----------         ------                     CBC with platelets and d...[[id number]]  Abnormal            Final result                 Please view results for these tests on the individual orders.   Urine Drug Screen     Status: Abnormal    Narrative    The following orders were created for panel order Urine Drug Screen.  Procedure                               Abnormality         Status                     ---------                               -----------         ------                     Urine Drug Screen Panel[[id number]]      Abnormal            Final result                 Please view results for these tests on the individual orders.     Medications   lidocaine 1 % 0.1-1 mL (has no administration in time range)   lidocaine (LMX4) cream (has no administration in time range)   sodium chloride (PF) 0.9% PF flush 3 mL (3 mLs Intracatheter $Given 4/21/24 4239)   sodium chloride (PF) 0.9% PF flush 3 mL (has no administration  in time range)   senna-docusate (SENOKOT-S/PERICOLACE) 8.6-50 MG per tablet 1 tablet (has no administration in time range)     Or   senna-docusate (SENOKOT-S/PERICOLACE) 8.6-50 MG per tablet 2 tablet (has no administration in time range)   calcium carbonate (TUMS) chewable tablet 1,000 mg (has no administration in time range)   Patient is already receiving anticoagulation with heparin, enoxaparin (LOVENOX), warfarin (COUMADIN)  or other anticoagulant medication (has no administration in time range)   acetaminophen (TYLENOL) tablet 650 mg (has no administration in time range)     Or   acetaminophen (TYLENOL) Suppository 650 mg (has no administration in time range)   niCARdipine 40 mg in 200 mL NS (CARDENE) infusion (5 mg/hr Intravenous Rate/Dose Verify 4/22/24 0015)   apixaban ANTICOAGULANT (ELIQUIS) tablet 5 mg (5 mg Oral $Given 4/21/24 2215)   carvedilol (COREG) tablet 25 mg (has no administration in time range)   polyethylene glycol (MIRALAX) Packet 17 g (has no administration in time range)   torsemide (DEMADEX) tablet 20 mg (has no administration in time range)   empagliflozin (JARDIANCE) tablet 10 mg (has no administration in time range)   lisinopril (ZESTRIL) tablet 40 mg (has no administration in time range)   potassium chloride nicole ER (KLOR-CON M10) CR tablet 20 mEq (has no administration in time range)   spironolactone (ALDACTONE) tablet 100 mg (has no administration in time range)   torsemide (DEMADEX) tablet 20 mg (20 mg Oral $Given 4/21/24 1756)   carvedilol (COREG) tablet 25 mg (25 mg Oral $Given 4/21/24 1755)   hydrALAZINE (APRESOLINE) injection 5 mg (5 mg Intravenous $Given 4/21/24 1757)   hydrALAZINE (APRESOLINE) injection 10 mg (10 mg Intravenous $Given 4/21/24 1857)     Labs Ordered and Resulted from Time of ED Arrival to Time of ED Departure   NT PROBNP INPATIENT - Abnormal       Result Value    N terminal Pro BNP Inpatient 1,481 (*)    TROPONIN T, HIGH SENSITIVITY - Abnormal    Troponin T, High  Sensitivity 48 (*)    COMPREHENSIVE METABOLIC PANEL - Abnormal    Sodium 137      Potassium 3.9      Carbon Dioxide (CO2) 30 (*)     Anion Gap 12      Urea Nitrogen 23.1 (*)     Creatinine 1.77 (*)     GFR Estimate 52 (*)     Calcium 9.5      Chloride 95 (*)     Glucose 122 (*)     Alkaline Phosphatase 96      AST        ALT 48      Protein Total 8.1      Albumin 4.1      Bilirubin Total 0.7     CBC WITH PLATELETS AND DIFFERENTIAL - Abnormal    WBC Count 7.3      RBC Count 5.61      Hemoglobin 14.1      Hematocrit 45.8      MCV 82      MCH 25.1 (*)     MCHC 30.8 (*)     RDW 17.2 (*)     Platelet Count 239      % Neutrophils 71      % Lymphocytes 15      % Monocytes 12      % Eosinophils 1      % Basophils 1      % Immature Granulocytes 0      NRBCs per 100 WBC 0      Absolute Neutrophils 5.2      Absolute Lymphocytes 1.1      Absolute Monocytes 0.9      Absolute Eosinophils 0.1      Absolute Basophils 0.0      Absolute Immature Granulocytes 0.0      Absolute NRBCs 0.0     URINE DRUG SCREEN PANEL - Abnormal    Amphetamines Urine Screen Negative      Barbituates Urine Screen Negative      Benzodiazepine Urine Screen Negative      Cannabinoids Urine Screen Positive (*)     Cocaine Urine Screen Negative      Fentanyl Qual Urine Screen Negative      Opiates Urine Screen Negative      PCP Urine Screen Negative     TROPONIN T, HIGH SENSITIVITY     XR Chest Port 1 View   Final Result   Impression:       Cardiomegaly with mild atelectasis. No acute cardiopulmonary   abnormality suspected.       I have personally reviewed the examination and initial interpretation   and I agree with the findings.      PRANAY MUNIZ DO            SYSTEM ID:  [id number]           Based upon patient's evaluation he is critically ill and does require critical care.  Approximately 60 minutes is spent in assessment, reassessment, record review, entering and interpretation of orders, discussion with consultants, and documentation.      Assessment &  Plan    Patient presents to the emergency department today for the above complaints.  Record was reviewed, he was just in the hospital for heart failure and hypoxia requiring diuresis and left earlier this morning despite the fact that the cardiology service did discuss with him the need for him to stay longer.  He elected to leave, went home and consumed marijuana edibles, then re-presented to the ED with hypertensive urgency and hypoxia.    Upon initial evaluation he is severely hypertensive with a blood pressure of 182/120, he is hypoxic requiring up to 2 L of oxygen via nasal cannula for hypoxia.  We did give the patient his regular antihypertensive medications (ED pharmacist did confirm that he did receive his morning medications in the hospital today) and then we gave an initial dose of 5 mg of hydralazine with very little effect.  Repeat dose of 10 mg of hydralazine was given with again very little effect.    Workup today shows improved BNP at 1481, down from the 16,000 range when he was admitted 2 days ago.  CBC essentially unremarkable, CMP shows a creatinine of 1.77, consistent with previous, and normal electrolytes.  Troponin elevated at 48.  EKG does not show any ischemia.    Chest x-ray per my read shows no acute infiltrate or obvious pulmonary edema.  Radiology agrees that there is cardiomegaly with atelectasis but no acute cardiopulmonary abnormality.    Given ongoing hypertension and hypoxia the patient will need to be admitted to the hospital at this time.  I have spoken to Dr. Hatfield of the cardiology service, and we will elect to start a nicardipine drip at this time.  Patient will be readmitted to the cardiology service.    I have reviewed the nursing notes. I have reviewed the findings, diagnosis, plan and need for follow up with the patient.    New Prescriptions    No medications on file       Final diagnoses:   Hypertensive urgency   Hypoxia   Acute on chronic systolic heart failure (H)   Use  of cannabinoid edibles       Beti Rodríguez MD  [facility] EMERGENCY DEPARTMENT  4/21/2024     Beti Rodríguez MD  04/22/24 0031

## 2024-04-21 NOTE — PROGRESS NOTES
DISCHARGE                           04/21/24    9:23 AM    ----------------------------------------------------------------------------  Discharged to: Home  Via: private transportation  Accompanied by: Family  Discharge Instructions: diet, activity, medications, follow up appointments, when to call the MD, aftercare instructions.  Prescriptions: To be filled by pt preferred pharmacy; medication list reviewed & sent with pt  Follow Up Appointments: arranged; information given  Belongings: All sent with pt  IV: d/c'd  Telemetry: d/c'd  Pt exhibits understanding of above discharge instructions; all questions answered.    Discharge Paperwork: sent home with patient.    Pt left without discharge orders completed. Provider is aware and ok with expedited discharge. Complete AVS will be on MyChart; discussed with pt how to access MyChart and access AVS.

## 2024-04-21 NOTE — PLAN OF CARE
Shift: [id number]  Neuro:A&O x 4. Calls appropriately.   Respiratory: Shallow breathing. Tachypneic. On RA. CPAP at bedtime. SpO2 >95%.  Cardiac: SR/ST. Denies chest pain. Afebrile.   GI/: Voids spontaenously. Urinal at bedside. No BM overnight.   Diet: 2g Na diet. 2L FR.   Pain: Endorses 5/10 mouth/jaw pain. Prn tylenol given.   IV Access:R PIV SL.  Activity:Independent.    New changes this shift: Prn hydralazine given at 2050 for 166/122 BP.  Plan: Continue POC.     Problem: Heart Failure  Goal: Effective Oxygenation and Ventilation  Intervention: Promote Airway Secretion Clearance  Recent Flowsheet Documentation  Taken 4/21/2024 0420 by Michael Valverde, JACK  Cough And Deep Breathing: done independently per patient  Activity Management: activity adjusted per tolerance  Taken 4/21/2024 0055 by Michael Valverde, RN  Activity Management: activity adjusted per tolerance  Taken 4/20/2024 2030 by Michael Valverde, JACK  Cough And Deep Breathing: done independently per patient  Activity Management: activity adjusted per tolerance    Problem: Heart Failure  Goal: Effective Breathing Pattern During Sleep  Intervention: Monitor and Manage Obstructive Sleep Apnea  Recent Flowsheet Documentation  Taken 4/20/2024 2030 by Michael Valverde, RN  Medication Review/Management: medications reviewed

## 2024-04-21 NOTE — ED TRIAGE NOTES
Discharged from [place] today after being admitted for heart falure with low spo2 and sob. At home took edibles and over the hour got sob and hypertensive of 190/110. Did not get any of home meds today. Bg 156, 18 gg iv co of nausea and got 2.5 mg of duparitdol

## 2024-04-22 VITALS
HEART RATE: 94 BPM | SYSTOLIC BLOOD PRESSURE: 118 MMHG | OXYGEN SATURATION: 98 % | DIASTOLIC BLOOD PRESSURE: 80 MMHG | RESPIRATION RATE: 20 BRPM | TEMPERATURE: 97.9 F

## 2024-04-22 LAB
ALBUMIN SERPL BCG-MCNC: 4.1 G/DL (ref 3.5–5.2)
ALP SERPL-CCNC: 100 U/L (ref 40–150)
ALT SERPL W P-5'-P-CCNC: 37 U/L (ref 0–70)
ANION GAP SERPL CALCULATED.3IONS-SCNC: 12 MMOL/L (ref 7–15)
AST SERPL W P-5'-P-CCNC: 30 U/L (ref 0–45)
ATRIAL RATE - MUSE: 95 BPM
ATRIAL RATE - MUSE: 99 BPM
BILIRUB SERPL-MCNC: 0.9 MG/DL
BUN SERPL-MCNC: 24.4 MG/DL (ref 6–20)
CALCIUM SERPL-MCNC: 9.7 MG/DL (ref 8.6–10)
CHLORIDE SERPL-SCNC: 98 MMOL/L (ref 98–107)
CREAT SERPL-MCNC: 1.71 MG/DL (ref 0.67–1.17)
DEPRECATED HCO3 PLAS-SCNC: 29 MMOL/L (ref 22–29)
DIASTOLIC BLOOD PRESSURE - MUSE: NORMAL MMHG
DIASTOLIC BLOOD PRESSURE - MUSE: NORMAL MMHG
EGFRCR SERPLBLD CKD-EPI 2021: 54 ML/MIN/1.73M2
ERYTHROCYTE [DISTWIDTH] IN BLOOD BY AUTOMATED COUNT: 17.8 % (ref 10–15)
GLUCOSE SERPL-MCNC: 100 MG/DL (ref 70–99)
HCT VFR BLD AUTO: 50 % (ref 40–53)
HGB BLD-MCNC: 15.4 G/DL (ref 13.3–17.7)
INTERPRETATION ECG - MUSE: NORMAL
INTERPRETATION ECG - MUSE: NORMAL
MAGNESIUM SERPL-MCNC: 1.9 MG/DL (ref 1.7–2.3)
MCH RBC QN AUTO: 25.2 PG (ref 26.5–33)
MCHC RBC AUTO-ENTMCNC: 30.8 G/DL (ref 31.5–36.5)
MCV RBC AUTO: 82 FL (ref 78–100)
P AXIS - MUSE: 51 DEGREES
P AXIS - MUSE: 52 DEGREES
PLATELET # BLD AUTO: 268 10E3/UL (ref 150–450)
POTASSIUM SERPL-SCNC: 3.3 MMOL/L (ref 3.4–5.3)
PR INTERVAL - MUSE: 180 MS
PR INTERVAL - MUSE: 192 MS
PROT SERPL-MCNC: 8.4 G/DL (ref 6.4–8.3)
QRS DURATION - MUSE: 94 MS
QRS DURATION - MUSE: 94 MS
QT - MUSE: 376 MS
QT - MUSE: 380 MS
QTC - MUSE: 477 MS
QTC - MUSE: 482 MS
R AXIS - MUSE: 27 DEGREES
R AXIS - MUSE: 29 DEGREES
RBC # BLD AUTO: 6.11 10E6/UL (ref 4.4–5.9)
SODIUM SERPL-SCNC: 139 MMOL/L (ref 135–145)
SYSTOLIC BLOOD PRESSURE - MUSE: NORMAL MMHG
SYSTOLIC BLOOD PRESSURE - MUSE: NORMAL MMHG
T AXIS - MUSE: 171 DEGREES
T AXIS - MUSE: 183 DEGREES
TROPONIN T SERPL HS-MCNC: 44 NG/L
VENTRICULAR RATE- MUSE: 95 BPM
VENTRICULAR RATE- MUSE: 99 BPM
WBC # BLD AUTO: 8.6 10E3/UL (ref 4–11)

## 2024-04-22 PROCEDURE — 85027 COMPLETE CBC AUTOMATED: CPT

## 2024-04-22 PROCEDURE — 84484 ASSAY OF TROPONIN QUANT: CPT

## 2024-04-22 PROCEDURE — 250N000011 HC RX IP 250 OP 636

## 2024-04-22 PROCEDURE — 83735 ASSAY OF MAGNESIUM: CPT | Performed by: INTERNAL MEDICINE

## 2024-04-22 PROCEDURE — 36415 COLL VENOUS BLD VENIPUNCTURE: CPT

## 2024-04-22 PROCEDURE — 80053 COMPREHEN METABOLIC PANEL: CPT

## 2024-04-22 RX ORDER — FUROSEMIDE 10 MG/ML
40 INJECTION INTRAMUSCULAR; INTRAVENOUS ONCE
Status: COMPLETED | OUTPATIENT
Start: 2024-04-22 | End: 2024-04-22

## 2024-04-22 RX ADMIN — FUROSEMIDE 40 MG: 10 INJECTION, SOLUTION INTRAMUSCULAR; INTRAVENOUS at 00:47

## 2024-04-22 ASSESSMENT — ACTIVITIES OF DAILY LIVING (ADL)
ADLS_ACUITY_SCORE: 37

## 2024-04-22 NOTE — PROVIDER NOTIFICATION
"Provider notified: Cards 1 JOSE ARMANDO via page    \"Pt is packing their things and ready to leave AMA, pt saying their ride will be here at 8. Do you want to come talk to them?\"  "

## 2024-04-22 NOTE — MEDICATION SCRIBE - ADMISSION MEDICATION HISTORY
Medication Scribe Admission Medication History    Admission medication history is complete. The information provided in this note is only as accurate as the sources available at the time of the update.    Information Source(s): Patient via in-person    Pertinent Information: Pt reported taking medications on PTA medication list, stated he has not taken Torsemide 20 mg tabs for couple weeks because he rand out while waiting for an appointment, appointment was changed, still did not get Torsemide, which cause him to come to ER,   reported Jardiiance 10 mg tabs is a new added prescription, got first dose here in the ER.  Changes made to PTA medication list:  Added: None  Deleted: None  Changed: None    Allergies reviewed with patient and updates made in EHR: yes    Medication History Completed By: Rakel Cruz 4/22/2024 6:34 AM    PTA Med List   Medication Sig Last Dose    apixaban ANTICOAGULANT (ELIQUIS) 5 MG tablet Take 1 tablet (5 mg) by mouth 2 times daily 4/19/2024    carvedilol (COREG) 25 MG tablet Take 1 tablet (25 mg) by mouth 2 times daily (with meals) 4/19/2024    empagliflozin (JARDIANCE) 10 MG TABS tablet Take 1 tablet (10 mg) by mouth daily 4/21/2024    lisinopril (ZESTRIL) 40 MG tablet Take 1 tablet (40 mg) by mouth daily 4/19/2024    polyethylene glycol (MIRALAX) 17 GM/Dose powder Take 17 g by mouth daily Past Month    potassium chloride nicole ER (KLOR-CON M20) 20 MEQ CR tablet Take 1 tablet (20 mEq) by mouth daily Past Month    spironolactone (ALDACTONE) 100 MG tablet Take 1 tablet (100 mg) by mouth daily Past Month    torsemide (DEMADEX) 20 MG tablet Take 1 tablet (20 mg) by mouth 2 times daily Past Month

## 2024-04-22 NOTE — H&P
History and Physical Cardiology     Miguel Ángel Wilson MRN:[id number]   YOB: 1990  Date of Admission:4/21/2024  Primary care provider: No Ref-Primary, Physician           Assessment and Plan:   Miguel Ángel Wilson is a 33 year old year old with PMHx of Atrial flutter, HTN, CKD2, HFrEF who is admitted with severe symptomatic hypertension      Severe symptomatic hypertension  Chronic HFrEF  Was discharged earlier in the day (per review seems as if patient was adamant about discharging). He went home took marijuana edibles and became short of breath.   - nicardipine gtt; goal SBP < 160  - continue PTA carvedilol and lisinopril  - s/p 10 and 5 mg of IV hydral  - TTE 4/19: EF 30-35% with severe diffuse hypokinesis; IVC diameter and respiratory changes fall into an intermediate range suggesting an RA pressure of 8 mmHg     - Volume Status: Hypervolemic              Home Diuretic: Resume PTA Torsemide 20mg BID  - GDMT:               - BB: Continue PTA Coreg 25mg BID               - ACE/ARB/ARNI: Continue PTA lisinopril 40mg daily              - AA: continue PTA spironolactone 100mg daily               - SGLT2i: Continue Jardiance 10mg daily  - No ischemic evaluation to date per patient; plan for Lexiscan stress test as OP     Chronic aflutter  - PTA apixaban    DMII  - PTA jardiance  - hypoglycemia protocol    Type II MI  - trend trop    DORY on CKD II  Hypokalemia  - BMP  - PTA kcl    PARVEEN  - home CPAP    CODE: Full  DVT: DOAC  FEN: Cardiac  Disposition: Home in 2-3 days    Gómez Negrete DO  Internal Medicine PGY-3    To be staffed in the AM    I very much appreciated the opportunity to  assess Miguel Ángel Wilson in the hospital ED.Patient had left earlier today against advice. BP was high then but he insisted on leaving. Now returns same day (4/21/24) with severe symptomatic hypertension. I agree with the note above which summarizes my findings and current recommendations.  Please do not hesitate to contact my  office if you have any questions or concerns.      Shane Hatfield MD  Cardiac Arrhythmia Service  [facility]  [phone]     HISTORY OF PRESENT ILLNESS:  Miguel Ángel Wilson is a 33 year old male who has a history of chronic heart failure, DM2, uncontrolled hypertension, history of a flutter, recent admission on 4/19/2024 for acute on chronic systolic heart failure with uncontrolled hypertension and need for diuresis who was discharged earlier today.  Recent TTE from 4/19/2024 shows EF of 30 to 35% with diffuse hypokinesis.     Of note, patient wanted to adamantly be discharged on 4/21 even though the medical team thought he needed more management. Patient returned home from the hospital, ingested few marijuana edibles and began experiencing SOB and chest pain. He did not  any of his antihypertensives     Patient was trying to sleep and did not contribute fully to the interview. However, he did deny ongoing chest pain, sob, prior to presentation headaches, vision changes, nausea, vomiting, fever, chills, sweats.     PAST MEDICAL HISTORY:    Past Medical History:   Diagnosis Date    Atrial flutter with rapid ventricular response (H) 1/31/2022    Essential hypertension 1/31/2022    Hypertension     Morbid obesity (H) 1/31/2022    Tobacco use 1/31/2022       Past Surgical History:   Procedure Laterality Date    IR RENAL ANGIOGRAM LEFT  4/19/2023    IR RENAL ANGIOGRAM LEFT  4/24/2023    IR RENAL EMBOLIZATION  4/19/2023        MEDICATIONS:  PTA Meds  Prior to Admission medications    Medication Sig Last Dose Taking? Auth Provider Long Term End Date   apixaban ANTICOAGULANT (ELIQUIS) 5 MG tablet Take 1 tablet (5 mg) by mouth 2 times daily   Shane Higgins MD PhD No    carvedilol (COREG) 25 MG tablet Take 1 tablet (25 mg) by mouth 2 times daily (with meals)   Michelle Toscano PA-C Yes    empagliflozin (JARDIANCE) 10 MG TABS tablet Take 1 tablet (10 mg) by mouth daily   Michelle Toscano PA-C     lisinopril  (ZESTRIL) 40 MG tablet Take 1 tablet (40 mg) by mouth daily   Shanon Valentino, RIKY Yes    polyethylene glycol (MIRALAX) 17 GM/Dose powder Take 17 g by mouth daily   Nelda Martinez MD     potassium chloride nicole ER (KLOR-CON M20) 20 MEQ CR tablet Take 1 tablet (20 mEq) by mouth daily   Michelle Toscano PA-C     spironolactone (ALDACTONE) 100 MG tablet Take 1 tablet (100 mg) by mouth daily   Nelda Martinez MD Yes    torsemide (DEMADEX) 20 MG tablet Take 1 tablet (20 mg) by mouth 2 times daily   Nelda Martinez MD Yes       Current Meds  Current Facility-Administered Medications   Medication Dose Route Frequency Provider Last Rate Last Admin    apixaban ANTICOAGULANT (ELIQUIS) tablet 5 mg  5 mg Oral BID Gómez Negrete MD   5 mg at 04/21/24 2215    [START ON 4/22/2024] carvedilol (COREG) tablet 25 mg  25 mg Oral BID w/meals Gómez Negrete MD        [START ON 4/22/2024] polyethylene glycol (MIRALAX) Packet 17 g  17 g Oral Daily Gómez Negrete MD        sodium chloride (PF) 0.9% PF flush 3 mL  3 mL Intracatheter Q8H Gómez Negrete MD   3 mL at 04/21/24 2150    [START ON 4/22/2024] torsemide (DEMADEX) tablet 20 mg  20 mg Oral BID Gómez Negrete MD           ALLERGIES:    No Known Allergies    REVIEW OF SYSTEMS:  A 10 point review of systems was negative except as noted above.    SOCIAL HISTORY:   Tobacco: Denies  Alcohol: denies  Illicit Drugs: Marijuana, edibles and smkoing    FAMILY MEDICAL HISTORY:   No family history on file.    PHYSICAL EXAM:   BP (!) 178/107   Pulse 78   Temp 98  F (36.7  C) (Oral)   Resp 20   SpO2 94%    Date 04/21/24 0700 - 04/22/24 0659   Shift 8374-3839 [id number] [id number] 24 Hour Total   INTAKE   P.O.  250  250   Shift Total  250  250   OUTPUT   Urine  1800  1800   Shift Total  1800  1800   Weight (kg)            General: Laying in bed, sleeping  Head: NC/AT  Eyes: non-icteric sclera, EOMI  Pulmonary: CTAB  CV: RRR, no m/r/g, JVD  difficult to appreciate due to body habitus  GI: soft, non-tender, non-distended  Skin: no rashes seen  EXT: no edema, WWP  Neuro: A&Ox3

## 2024-04-24 ENCOUNTER — PATIENT OUTREACH (OUTPATIENT)
Dept: CARE COORDINATION | Facility: CLINIC | Age: 34
End: 2024-04-24
Payer: COMMERCIAL

## 2024-04-24 LAB
BACTERIA BLD CULT: NO GROWTH
BACTERIA BLD CULT: NO GROWTH

## 2024-04-24 NOTE — DISCHARGE SUMMARY
Patient left against advice  . The note above summarizes my findings and current recommendations.  Please do not hesitate to contact my office if you have any questions or concerns.      Shane Hatfield MD  Cardiac Arrhythmia Service  [facility]  [phone]

## 2024-04-24 NOTE — PROGRESS NOTES
Connected Care Resource Center Contact  [facility]/Voicemail     Clinical Data: Post-Discharge Outreach     Outreach attempted x 2.  Unable to leave message on patient's voicemail,   Plan:  [facility] will do no further outreaches at this time.       ELIANA Cisneros  [facility] Care Resource [place], [facility]    *Connected Care Resource Team does NOT follow patient ongoing. Referrals are identified based on internal discharge reports and the outreach is to ensure patient has an understanding of their discharge instructions.

## 2024-07-29 ENCOUNTER — HOSPITAL ENCOUNTER (EMERGENCY)
Facility: CLINIC | Age: 34
Discharge: HOME | End: 2024-07-30
Attending: STUDENT IN AN ORGANIZED HEALTH CARE EDUCATION/TRAINING PROGRAM | Admitting: STUDENT IN AN ORGANIZED HEALTH CARE EDUCATION/TRAINING PROGRAM
Payer: COMMERCIAL

## 2024-07-29 DIAGNOSIS — I10 ESSENTIAL HYPERTENSION: ICD-10-CM

## 2024-07-29 DIAGNOSIS — Z76.0 ENCOUNTER FOR MEDICATION REFILL: ICD-10-CM

## 2024-07-29 DIAGNOSIS — I16.1 HYPERTENSIVE EMERGENCY WITHOUT CONGESTIVE HEART FAILURE: ICD-10-CM

## 2024-07-29 DIAGNOSIS — Z79.01 LONG TERM (CURRENT) USE OF ANTICOAGULANTS: ICD-10-CM

## 2024-07-29 DIAGNOSIS — I50.20 HEART FAILURE WITH REDUCED EJECTION FRACTION, NYHA CLASS I (H): ICD-10-CM

## 2024-07-29 PROCEDURE — 99284 EMERGENCY DEPT VISIT MOD MDM: CPT | Performed by: STUDENT IN AN ORGANIZED HEALTH CARE EDUCATION/TRAINING PROGRAM

## 2024-07-29 PROCEDURE — 93005 ELECTROCARDIOGRAM TRACING: CPT | Performed by: STUDENT IN AN ORGANIZED HEALTH CARE EDUCATION/TRAINING PROGRAM

## 2024-07-29 PROCEDURE — 93010 ELECTROCARDIOGRAM REPORT: CPT | Performed by: STUDENT IN AN ORGANIZED HEALTH CARE EDUCATION/TRAINING PROGRAM

## 2024-07-29 RX ORDER — LISINOPRIL 40 MG/1
40 TABLET ORAL DAILY
Qty: 90 TABLET | Refills: 3 | Status: SHIPPED | OUTPATIENT
Start: 2024-07-29

## 2024-07-29 RX ORDER — TORSEMIDE 20 MG/1
20 TABLET ORAL DAILY
Status: DISCONTINUED | OUTPATIENT
Start: 2024-07-30 | End: 2024-07-30 | Stop reason: HOSPADM

## 2024-07-29 RX ORDER — POLYETHYLENE GLYCOL 3350 17 G/17G
17 POWDER, FOR SOLUTION ORAL DAILY
Qty: 510 G | Refills: 3 | Status: SHIPPED | OUTPATIENT
Start: 2024-07-29

## 2024-07-29 RX ORDER — SPIRONOLACTONE 100 MG/1
100 TABLET, FILM COATED ORAL DAILY
Qty: 90 TABLET | Refills: 3 | Status: SHIPPED | OUTPATIENT
Start: 2024-07-29

## 2024-07-29 RX ORDER — TORSEMIDE 20 MG/1
20 TABLET ORAL
Qty: 60 TABLET | Refills: 3 | Status: SHIPPED | OUTPATIENT
Start: 2024-07-29

## 2024-07-29 ASSESSMENT — COLUMBIA-SUICIDE SEVERITY RATING SCALE - C-SSRS
1. IN THE PAST MONTH, HAVE YOU WISHED YOU WERE DEAD OR WISHED YOU COULD GO TO SLEEP AND NOT WAKE UP?: NO
2. HAVE YOU ACTUALLY HAD ANY THOUGHTS OF KILLING YOURSELF IN THE PAST MONTH?: NO
6. HAVE YOU EVER DONE ANYTHING, STARTED TO DO ANYTHING, OR PREPARED TO DO ANYTHING TO END YOUR LIFE?: NO

## 2024-07-30 VITALS
TEMPERATURE: 98.2 F | SYSTOLIC BLOOD PRESSURE: 187 MMHG | BODY MASS INDEX: 39.78 KG/M2 | WEIGHT: 310 LBS | RESPIRATION RATE: 17 BRPM | HEIGHT: 74 IN | DIASTOLIC BLOOD PRESSURE: 100 MMHG | OXYGEN SATURATION: 97 % | HEART RATE: 106 BPM

## 2024-07-30 LAB
ANION GAP SERPL CALCULATED.3IONS-SCNC: 11 MMOL/L (ref 7–15)
ATRIAL RATE - MUSE: 109 BPM
BASOPHILS # BLD AUTO: 0.1 10E3/UL (ref 0–0.2)
BASOPHILS NFR BLD AUTO: 1 %
BUN SERPL-MCNC: 18.9 MG/DL (ref 6–20)
CALCIUM SERPL-MCNC: 8.9 MG/DL (ref 8.8–10.4)
CHLORIDE SERPL-SCNC: 103 MMOL/L (ref 98–107)
CREAT SERPL-MCNC: 1.63 MG/DL (ref 0.67–1.17)
DIASTOLIC BLOOD PRESSURE - MUSE: NORMAL MMHG
EGFRCR SERPLBLD CKD-EPI 2021: 57 ML/MIN/1.73M2
EOSINOPHIL # BLD AUTO: 0.1 10E3/UL (ref 0–0.7)
EOSINOPHIL NFR BLD AUTO: 2 %
ERYTHROCYTE [DISTWIDTH] IN BLOOD BY AUTOMATED COUNT: 17.5 % (ref 10–15)
GLUCOSE SERPL-MCNC: 101 MG/DL (ref 70–99)
HCO3 SERPL-SCNC: 27 MMOL/L (ref 22–29)
HCT VFR BLD AUTO: 40.3 % (ref 40–53)
HGB BLD-MCNC: 11.7 G/DL (ref 13.3–17.7)
HOLD SPECIMEN: NORMAL
IMM GRANULOCYTES # BLD: 0 10E3/UL
IMM GRANULOCYTES NFR BLD: 0 %
INTERPRETATION ECG - MUSE: NORMAL
LYMPHOCYTES # BLD AUTO: 1.7 10E3/UL (ref 0.8–5.3)
LYMPHOCYTES NFR BLD AUTO: 30 %
MCH RBC QN AUTO: 22.5 PG (ref 26.5–33)
MCHC RBC AUTO-ENTMCNC: 29 G/DL (ref 31.5–36.5)
MCV RBC AUTO: 78 FL (ref 78–100)
MONOCYTES # BLD AUTO: 0.7 10E3/UL (ref 0–1.3)
MONOCYTES NFR BLD AUTO: 12 %
NEUTROPHILS # BLD AUTO: 3.1 10E3/UL (ref 1.6–8.3)
NEUTROPHILS NFR BLD AUTO: 55 %
NRBC # BLD AUTO: 0 10E3/UL
NRBC BLD AUTO-RTO: 0 /100
P AXIS - MUSE: 44 DEGREES
PLATELET # BLD AUTO: 221 10E3/UL (ref 150–450)
POTASSIUM SERPL-SCNC: 3.4 MMOL/L (ref 3.4–5.3)
PR INTERVAL - MUSE: 184 MS
QRS DURATION - MUSE: 96 MS
QT - MUSE: 340 MS
QTC - MUSE: 457 MS
R AXIS - MUSE: 22 DEGREES
RBC # BLD AUTO: 5.2 10E6/UL (ref 4.4–5.9)
SODIUM SERPL-SCNC: 141 MMOL/L (ref 135–145)
SYSTOLIC BLOOD PRESSURE - MUSE: NORMAL MMHG
T AXIS - MUSE: 207 DEGREES
VENTRICULAR RATE- MUSE: 109 BPM
WBC # BLD AUTO: 5.6 10E3/UL (ref 4–11)

## 2024-07-30 PROCEDURE — 250N000013 HC RX MED GY IP 250 OP 250 PS 637: Performed by: STUDENT IN AN ORGANIZED HEALTH CARE EDUCATION/TRAINING PROGRAM

## 2024-07-30 PROCEDURE — 80048 BASIC METABOLIC PNL TOTAL CA: CPT | Performed by: STUDENT IN AN ORGANIZED HEALTH CARE EDUCATION/TRAINING PROGRAM

## 2024-07-30 PROCEDURE — 85025 COMPLETE CBC W/AUTO DIFF WBC: CPT | Performed by: STUDENT IN AN ORGANIZED HEALTH CARE EDUCATION/TRAINING PROGRAM

## 2024-07-30 PROCEDURE — 36415 COLL VENOUS BLD VENIPUNCTURE: CPT | Performed by: STUDENT IN AN ORGANIZED HEALTH CARE EDUCATION/TRAINING PROGRAM

## 2024-07-30 RX ADMIN — TORSEMIDE 20 MG: 20 TABLET ORAL at 01:17

## 2024-07-30 RX ADMIN — APIXABAN 5 MG: 5 TABLET, FILM COATED ORAL at 00:42

## 2024-07-30 ASSESSMENT — ACTIVITIES OF DAILY LIVING (ADL): ADLS_ACUITY_SCORE: 37

## 2024-07-30 NOTE — ED PROVIDER NOTES
[place] EMERGENCY DEPARTMENT ([facility])    7/29/24       ED PROVIDER NOTE    History     Chief Complaint   Patient presents with    Medication Refill     The history is provided by the patient and medical records.     Miguel Ángel Wilson is a 33 year old male with PMH notable for DM2, atrial flutter, poorly controlled HTN, CKD, HFrEF (EF 35-40% per ECHO 04/19/24) with recent admission (04/19-04/21) 2/2 acute on chronic HFrEF and uncontrolled HTN, who presents to the Emergency Department for medication refill.    Patient reports half of his medications, including Eliquis, lisinopril, torsemide, from the last ED visit were sent to the wrong pharmacy. He has been off those medications for ~3 months.   He denies developing new or worsening symptoms. He denies other medical concerns.     ECHO COMPLETE (04/19/24):   Interpretation Summary  Left ventricular function is moderately reduced. The ejection fraction is 35- 40%  Right ventricle is not well visualized, but global function is probably normal (S' 10.1cm, TAPSE 2.1cm).  No significant valve abnormalities.  Estimated pulmonary artery systolic pressure is 50 mmHg.  IVC diameter and respiratory changes fall into an intermediate range suggesting an RA pressure of 8 mmHg.    Past Medical History  Past Medical History:   Diagnosis Date    Atrial flutter with rapid ventricular response (H) 1/31/2022    Essential hypertension 1/31/2022    Hypertension     Morbid obesity (H) 1/31/2022    Tobacco use 1/31/2022     Past Surgical History:   Procedure Laterality Date    IR RENAL ANGIOGRAM LEFT  4/19/2023    IR RENAL ANGIOGRAM LEFT  4/24/2023    IR RENAL EMBOLIZATION  4/19/2023     apixaban ANTICOAGULANT (ELIQUIS) 5 MG tablet  carvedilol (COREG) 25 MG tablet  empagliflozin (JARDIANCE) 10 MG TABS tablet  lisinopril (ZESTRIL) 40 MG tablet  polyethylene glycol (MIRALAX) 17 GM/Dose powder  potassium chloride nicole ER (KLOR-CON M20) 20 MEQ CR tablet  spironolactone (ALDACTONE)  "100 MG tablet  torsemide (DEMADEX) 20 MG tablet      No Known Allergies  Family History  No family history on file.  Social History   Social History     Tobacco Use    Smoking status: Never     Passive exposure: Never    Smokeless tobacco: Never   Vaping Use    Vaping status: Never Used   Substance Use Topics    Alcohol use: No    Drug use: No      A medically appropriate review of systems was performed with pertinent positives and negatives noted in the HPI, and all other systems negative.    Physical Exam   BP: 98/59  Pulse: 108  Temp: 98.2  F (36.8  C)  Resp: 18  Height: 188 cm (6' 2\")  Weight: 140.6 kg (310 lb)  SpO2: 96 %  Physical Exam  Constitutional:       General: He is not in acute distress.     Appearance: Normal appearance. He is not toxic-appearing.   HENT:      Head: Atraumatic.   Eyes:      General: No scleral icterus.     Conjunctiva/sclera: Conjunctivae normal.   Cardiovascular:      Rate and Rhythm: Normal rate.      Heart sounds: Normal heart sounds.   Pulmonary:      Effort: Pulmonary effort is normal. No respiratory distress.      Breath sounds: Normal breath sounds.   Abdominal:      Palpations: Abdomen is soft.      Tenderness: There is no abdominal tenderness.   Musculoskeletal:         General: No deformity.      Cervical back: Neck supple.   Skin:     General: Skin is warm.   Neurological:      Mental Status: He is alert.           ED Course, Procedures, & Data      Procedures            EKG Interpretation:      Interpreted by Shankar Gordon MD  Time reviewed: 1243  Symptoms at time of EKG: Medication refill. Asymptomatic.   Rhythm: sinus tachycardia  Rate: 109 BPM  Axis: normal  Ectopy: none  Conduction: normal  ST Segments/ T Waves: No ST-T wave changes  Q Waves: none  Comparison to prior: Unchanged    Clinical Impression: Left ventricular hypertrophy. Sinus tachycardia.       No results found for any visits on 07/29/24.  Medications - No data to display  Labs Ordered and Resulted from " Time of ED Arrival to Time of ED Departure - No data to display  No orders to display          Critical care was not performed.     Medical Decision Making  The patient's presentation was of moderate complexity (2 or more stable chronic illnesses).    The patient's evaluation involved:  review of external note(s) from 3+ sources (see separate area of note for details)  review of 3+ test result(s) ordered prior to this encounter (see separate area of note for details)  ordering and/or review of 3+ test(s) in this encounter (see separate area of note for details)    The patient's management necessitated moderate risk (discharging home with his home medications, instructed to follow-up with his primary care doctor, review and interpretation of multiple lab tests).    Assessment & Plan    Here in the emergency room although I considered not doing any workup as patient came in for medication refills, as patient has been taking potassium but no potassium excreting diuretics for the last 2 months, ordered labs and EKG to make sure his potassium level was extremely elevated  Labs and EKG as reviewed interpreted by me is reassuring, no acute electrolyte abnormality seen on labs, or signs of hyperkalemia on EKG  As patient is asymptomatic and is otherwise reassuring labs and EKG, will discharge him home with his medications prescribed to the correct pharmacy, and a primary care referral and instructed to follow-up with his primary care doctor in the next 3 to 4 days    I have reviewed the nursing notes. I have reviewed the findings, diagnosis, plan and need for follow up with the patient.    New Prescriptions    No medications on file       Final diagnoses:   None       Shankar Gordon MD  [facility] EMERGENCY DEPARTMENT  7/29/2024           Shankar Gordon MD  07/30/24 0143

## 2024-07-30 NOTE — DISCHARGE INSTRUCTIONS
You were seen in the emergency room for a medication refill, and I prescribed all of your medications to the [facility]  I have set you up with a visit with a primary care doctor as it does not appear that you have been following up with 1, and they should be calling you within the next few days  If you have any new symptoms, please return immediately to the emergency room

## 2024-07-30 NOTE — ED TRIAGE NOTES
"Chief Complaint: Patient presents to the ED after some medications from previous visit were sent to the wrong pharmacy.     Onset of Symptoms: Ongoing    Home Remedies: NA    Triage Vital Signs: BP 98/59   Pulse 108   Temp 98.2  F (36.8  C) (Oral)   Resp 18   Ht 1.88 m (6' 2\")   Wt 140.6 kg (310 lb)   SpO2 96%   BMI 39.80 kg/m      Cruz Norton RN  7/29/2024     Triage Assessment (Adult)       Row Name 07/29/24 3682          Triage Assessment    Airway WDL WDL        Respiratory WDL    Respiratory WDL WDL        Skin Circulation/Temperature WDL    Skin Circulation/Temperature WDL WDL        Cardiac WDL    Cardiac WDL WDL        Peripheral/Neurovascular WDL    Peripheral Neurovascular WDL WDL        Cognitive/Neuro/Behavioral WDL    Cognitive/Neuro/Behavioral WDL WDL                     "

## 2024-09-15 ENCOUNTER — HEALTH MAINTENANCE LETTER (OUTPATIENT)
Age: 34
End: 2024-09-15

## 2025-01-11 ENCOUNTER — HEALTH MAINTENANCE LETTER (OUTPATIENT)
Age: 35
End: 2025-01-11

## 2025-04-26 ENCOUNTER — HEALTH MAINTENANCE LETTER (OUTPATIENT)
Age: 35
End: 2025-04-26

## 2025-07-03 ENCOUNTER — APPOINTMENT (OUTPATIENT)
Dept: CT IMAGING | Facility: CLINIC | Age: 35
End: 2025-07-03
Attending: STUDENT IN AN ORGANIZED HEALTH CARE EDUCATION/TRAINING PROGRAM

## 2025-07-03 ENCOUNTER — APPOINTMENT (OUTPATIENT)
Dept: GENERAL RADIOLOGY | Facility: CLINIC | Age: 35
End: 2025-07-03
Attending: STUDENT IN AN ORGANIZED HEALTH CARE EDUCATION/TRAINING PROGRAM

## 2025-07-03 ENCOUNTER — HOSPITAL ENCOUNTER (EMERGENCY)
Facility: CLINIC | Age: 35
Discharge: HOME | End: 2025-07-03
Attending: STUDENT IN AN ORGANIZED HEALTH CARE EDUCATION/TRAINING PROGRAM

## 2025-07-03 VITALS
WEIGHT: 315 LBS | TEMPERATURE: 97.6 F | HEART RATE: 85 BPM | HEIGHT: 74 IN | OXYGEN SATURATION: 95 % | RESPIRATION RATE: 16 BRPM | SYSTOLIC BLOOD PRESSURE: 207 MMHG | BODY MASS INDEX: 40.43 KG/M2 | DIASTOLIC BLOOD PRESSURE: 157 MMHG

## 2025-07-03 DIAGNOSIS — I10 ESSENTIAL HYPERTENSION: ICD-10-CM

## 2025-07-03 DIAGNOSIS — I50.9 ACUTE ON CHRONIC CONGESTIVE HEART FAILURE, UNSPECIFIED HEART FAILURE TYPE (H): ICD-10-CM

## 2025-07-03 DIAGNOSIS — Z79.01 LONG TERM (CURRENT) USE OF ANTICOAGULANTS: ICD-10-CM

## 2025-07-03 DIAGNOSIS — I50.20 HEART FAILURE WITH REDUCED EJECTION FRACTION, NYHA CLASS I (H): ICD-10-CM

## 2025-07-03 DIAGNOSIS — I16.1 HYPERTENSIVE EMERGENCY WITHOUT CONGESTIVE HEART FAILURE: ICD-10-CM

## 2025-07-03 DIAGNOSIS — I50.23 ACUTE ON CHRONIC SYSTOLIC CONGESTIVE HEART FAILURE (H): ICD-10-CM

## 2025-07-03 DIAGNOSIS — M54.6 ACUTE MIDLINE THORACIC BACK PAIN: ICD-10-CM

## 2025-07-03 LAB
ALBUMIN UR-MCNC: 30 MG/DL
ANION GAP SERPL CALCULATED.3IONS-SCNC: 15 MMOL/L (ref 7–15)
APPEARANCE UR: CLEAR
APTT PPP: 34 SECONDS (ref 22–38)
BASOPHILS # BLD AUTO: 0 10E3/UL (ref 0–0.2)
BASOPHILS NFR BLD AUTO: 1 %
BILIRUB UR QL STRIP: NEGATIVE
BUN SERPL-MCNC: 21.6 MG/DL (ref 6–20)
CALCIUM SERPL-MCNC: 9.1 MG/DL (ref 8.8–10.4)
CHLORIDE SERPL-SCNC: 100 MMOL/L (ref 98–107)
COLOR UR AUTO: ABNORMAL
CREAT SERPL-MCNC: 1.84 MG/DL (ref 0.67–1.17)
D DIMER PPP FEU-MCNC: 0.53 UG/ML FEU (ref 0–0.5)
EGFRCR SERPLBLD CKD-EPI 2021: 49 ML/MIN/1.73M2
EOSINOPHIL # BLD AUTO: 0.1 10E3/UL (ref 0–0.7)
EOSINOPHIL NFR BLD AUTO: 1 %
ERYTHROCYTE [DISTWIDTH] IN BLOOD BY AUTOMATED COUNT: 18.6 % (ref 10–15)
GLUCOSE SERPL-MCNC: 103 MG/DL (ref 70–99)
GLUCOSE UR STRIP-MCNC: NEGATIVE MG/DL
HCO3 SERPL-SCNC: 25 MMOL/L (ref 22–29)
HCT VFR BLD AUTO: 40.6 % (ref 40–53)
HGB BLD-MCNC: 12.8 G/DL (ref 13.3–17.7)
HGB UR QL STRIP: NEGATIVE
HYALINE CASTS: 3 /LPF
IMM GRANULOCYTES # BLD: 0 10E3/UL
IMM GRANULOCYTES NFR BLD: 0 %
INR PPP: 1.07 (ref 0.85–1.15)
KETONES UR STRIP-MCNC: NEGATIVE MG/DL
LEUKOCYTE ESTERASE UR QL STRIP: NEGATIVE
LYMPHOCYTES # BLD AUTO: 1.4 10E3/UL (ref 0.8–5.3)
LYMPHOCYTES NFR BLD AUTO: 21 %
MAGNESIUM SERPL-MCNC: 1.7 MG/DL (ref 1.7–2.3)
MCH RBC QN AUTO: 27.6 PG (ref 26.5–33)
MCHC RBC AUTO-ENTMCNC: 31.5 G/DL (ref 31.5–36.5)
MCV RBC AUTO: 88 FL (ref 78–100)
MONOCYTES # BLD AUTO: 0.6 10E3/UL (ref 0–1.3)
MONOCYTES NFR BLD AUTO: 9 %
MUCOUS THREADS #/AREA URNS LPF: PRESENT /LPF
NEUTROPHILS # BLD AUTO: 4.4 10E3/UL (ref 1.6–8.3)
NEUTROPHILS NFR BLD AUTO: 68 %
NITRATE UR QL: NEGATIVE
NRBC # BLD AUTO: 0 10E3/UL
NRBC BLD AUTO-RTO: 0 /100
NT-PROBNP SERPL-MCNC: ABNORMAL PG/ML (ref 0–93)
PH UR STRIP: 7 [PH] (ref 5–7)
PLATELET # BLD AUTO: 153 10E3/UL (ref 150–450)
POTASSIUM SERPL-SCNC: 4.2 MMOL/L (ref 3.4–5.3)
PROTHROMBIN TIME: 14.3 SECONDS (ref 11.8–14.8)
RBC # BLD AUTO: 4.63 10E6/UL (ref 4.4–5.9)
RBC URINE: 1 /HPF
SODIUM SERPL-SCNC: 140 MMOL/L (ref 135–145)
SP GR UR STRIP: 1.01 (ref 1–1.03)
TROPONIN T SERPL HS-MCNC: 49 NG/L
UROBILINOGEN UR STRIP-MCNC: NORMAL MG/DL
WBC # BLD AUTO: 6.5 10E3/UL (ref 4–11)
WBC URINE: <1 /HPF

## 2025-07-03 PROCEDURE — 85379 FIBRIN DEGRADATION QUANT: CPT | Performed by: STUDENT IN AN ORGANIZED HEALTH CARE EDUCATION/TRAINING PROGRAM

## 2025-07-03 PROCEDURE — 93005 ELECTROCARDIOGRAM TRACING: CPT | Performed by: STUDENT IN AN ORGANIZED HEALTH CARE EDUCATION/TRAINING PROGRAM

## 2025-07-03 PROCEDURE — 85730 THROMBOPLASTIN TIME PARTIAL: CPT | Performed by: STUDENT IN AN ORGANIZED HEALTH CARE EDUCATION/TRAINING PROGRAM

## 2025-07-03 PROCEDURE — 36415 COLL VENOUS BLD VENIPUNCTURE: CPT | Performed by: STUDENT IN AN ORGANIZED HEALTH CARE EDUCATION/TRAINING PROGRAM

## 2025-07-03 PROCEDURE — 74177 CT ABD & PELVIS W/CONTRAST: CPT

## 2025-07-03 PROCEDURE — 96374 THER/PROPH/DIAG INJ IV PUSH: CPT | Mod: 59 | Performed by: STUDENT IN AN ORGANIZED HEALTH CARE EDUCATION/TRAINING PROGRAM

## 2025-07-03 PROCEDURE — 81003 URINALYSIS AUTO W/O SCOPE: CPT | Performed by: STUDENT IN AN ORGANIZED HEALTH CARE EDUCATION/TRAINING PROGRAM

## 2025-07-03 PROCEDURE — 85004 AUTOMATED DIFF WBC COUNT: CPT | Performed by: STUDENT IN AN ORGANIZED HEALTH CARE EDUCATION/TRAINING PROGRAM

## 2025-07-03 PROCEDURE — 81001 URINALYSIS AUTO W/SCOPE: CPT | Performed by: STUDENT IN AN ORGANIZED HEALTH CARE EDUCATION/TRAINING PROGRAM

## 2025-07-03 PROCEDURE — 71046 X-RAY EXAM CHEST 2 VIEWS: CPT

## 2025-07-03 PROCEDURE — 250N000009 HC RX 250: Performed by: STUDENT IN AN ORGANIZED HEALTH CARE EDUCATION/TRAINING PROGRAM

## 2025-07-03 PROCEDURE — 85610 PROTHROMBIN TIME: CPT | Performed by: STUDENT IN AN ORGANIZED HEALTH CARE EDUCATION/TRAINING PROGRAM

## 2025-07-03 PROCEDURE — 76604 US EXAM CHEST: CPT | Mod: 26 | Performed by: STUDENT IN AN ORGANIZED HEALTH CARE EDUCATION/TRAINING PROGRAM

## 2025-07-03 PROCEDURE — 85018 HEMOGLOBIN: CPT | Performed by: STUDENT IN AN ORGANIZED HEALTH CARE EDUCATION/TRAINING PROGRAM

## 2025-07-03 PROCEDURE — 80048 BASIC METABOLIC PNL TOTAL CA: CPT | Performed by: STUDENT IN AN ORGANIZED HEALTH CARE EDUCATION/TRAINING PROGRAM

## 2025-07-03 PROCEDURE — 76604 US EXAM CHEST: CPT | Performed by: STUDENT IN AN ORGANIZED HEALTH CARE EDUCATION/TRAINING PROGRAM

## 2025-07-03 PROCEDURE — 99285 EMERGENCY DEPT VISIT HI MDM: CPT | Mod: 25 | Performed by: STUDENT IN AN ORGANIZED HEALTH CARE EDUCATION/TRAINING PROGRAM

## 2025-07-03 PROCEDURE — 93010 ELECTROCARDIOGRAM REPORT: CPT | Performed by: STUDENT IN AN ORGANIZED HEALTH CARE EDUCATION/TRAINING PROGRAM

## 2025-07-03 PROCEDURE — 83880 ASSAY OF NATRIURETIC PEPTIDE: CPT | Performed by: STUDENT IN AN ORGANIZED HEALTH CARE EDUCATION/TRAINING PROGRAM

## 2025-07-03 PROCEDURE — 250N000011 HC RX IP 250 OP 636: Performed by: STUDENT IN AN ORGANIZED HEALTH CARE EDUCATION/TRAINING PROGRAM

## 2025-07-03 PROCEDURE — 250N000013 HC RX MED GY IP 250 OP 250 PS 637: Performed by: STUDENT IN AN ORGANIZED HEALTH CARE EDUCATION/TRAINING PROGRAM

## 2025-07-03 PROCEDURE — 84484 ASSAY OF TROPONIN QUANT: CPT | Performed by: STUDENT IN AN ORGANIZED HEALTH CARE EDUCATION/TRAINING PROGRAM

## 2025-07-03 PROCEDURE — 83735 ASSAY OF MAGNESIUM: CPT | Performed by: STUDENT IN AN ORGANIZED HEALTH CARE EDUCATION/TRAINING PROGRAM

## 2025-07-03 RX ORDER — ACETAMINOPHEN 500 MG
1000 TABLET ORAL ONCE
Status: COMPLETED | OUTPATIENT
Start: 2025-07-03 | End: 2025-07-03

## 2025-07-03 RX ORDER — OXYCODONE HYDROCHLORIDE 5 MG/1
5 TABLET ORAL ONCE
Refills: 0 | Status: COMPLETED | OUTPATIENT
Start: 2025-07-03 | End: 2025-07-03

## 2025-07-03 RX ORDER — SPIRONOLACTONE 100 MG/1
100 TABLET, FILM COATED ORAL DAILY
Qty: 30 TABLET | Refills: 0 | Status: SHIPPED | OUTPATIENT
Start: 2025-07-03 | End: 2025-08-02

## 2025-07-03 RX ORDER — METHOCARBAMOL 500 MG/1
500 TABLET, FILM COATED ORAL ONCE
Status: COMPLETED | OUTPATIENT
Start: 2025-07-03 | End: 2025-07-03

## 2025-07-03 RX ORDER — LISINOPRIL 40 MG/1
40 TABLET ORAL DAILY
Qty: 30 TABLET | Refills: 0 | Status: SHIPPED | OUTPATIENT
Start: 2025-07-03 | End: 2025-08-02

## 2025-07-03 RX ORDER — SPIRONOLACTONE 100 MG/1
100 TABLET, FILM COATED ORAL DAILY
Status: DISPENSED | OUTPATIENT
Start: 2025-07-03

## 2025-07-03 RX ORDER — CARVEDILOL 25 MG/1
25 TABLET ORAL 2 TIMES DAILY WITH MEALS
Status: DISPENSED | OUTPATIENT
Start: 2025-07-03

## 2025-07-03 RX ORDER — FUROSEMIDE 10 MG/ML
80 INJECTION INTRAMUSCULAR; INTRAVENOUS ONCE
Status: COMPLETED | OUTPATIENT
Start: 2025-07-03 | End: 2025-07-03

## 2025-07-03 RX ORDER — TORSEMIDE 20 MG/1
20 TABLET ORAL
Qty: 60 TABLET | Refills: 0 | Status: SHIPPED | OUTPATIENT
Start: 2025-07-03 | End: 2025-08-02

## 2025-07-03 RX ORDER — CARVEDILOL 25 MG/1
25 TABLET ORAL 2 TIMES DAILY WITH MEALS
Qty: 60 TABLET | Refills: 0 | Status: SHIPPED | OUTPATIENT
Start: 2025-07-03 | End: 2025-08-02

## 2025-07-03 RX ORDER — LISINOPRIL 40 MG/1
40 TABLET ORAL DAILY
Status: DISPENSED | OUTPATIENT
Start: 2025-07-03

## 2025-07-03 RX ORDER — IOPAMIDOL 755 MG/ML
500 INJECTION, SOLUTION INTRAVASCULAR ONCE
Status: COMPLETED | OUTPATIENT
Start: 2025-07-03 | End: 2025-07-03

## 2025-07-03 RX ADMIN — LISINOPRIL 40 MG: 40 TABLET ORAL at 22:25

## 2025-07-03 RX ADMIN — FUROSEMIDE 80 MG: 10 INJECTION, SOLUTION INTRAMUSCULAR; INTRAVENOUS at 20:41

## 2025-07-03 RX ADMIN — OXYCODONE HYDROCHLORIDE 5 MG: 5 TABLET ORAL at 19:22

## 2025-07-03 RX ADMIN — SODIUM CHLORIDE 90 ML: 9 INJECTION, SOLUTION INTRAVENOUS at 20:51

## 2025-07-03 RX ADMIN — METHOCARBAMOL 500 MG: 500 TABLET ORAL at 19:22

## 2025-07-03 RX ADMIN — ACETAMINOPHEN 1000 MG: 500 TABLET ORAL at 19:22

## 2025-07-03 RX ADMIN — CARVEDILOL 25 MG: 25 TABLET, FILM COATED ORAL at 21:53

## 2025-07-03 RX ADMIN — IOPAMIDOL 90 ML: 755 INJECTION, SOLUTION INTRAVENOUS at 20:50

## 2025-07-03 RX ADMIN — SPIRONOLACTONE 100 MG: 100 TABLET, FILM COATED ORAL at 21:53

## 2025-07-03 ASSESSMENT — ACTIVITIES OF DAILY LIVING (ADL)
ADLS_ACUITY_SCORE: 54

## 2025-07-03 ASSESSMENT — ENCOUNTER SYMPTOMS: BACK PAIN: 1

## 2025-07-03 NOTE — Clinical Note
Miguel Ángel Wilson was seen and treated in our emergency department on 7/3/2025.  He may return to work on 07/04/2025.  Mr. Wilson was seen in the [facility] ED tonight for severe back pain. His work-up revealed an exacerbation of some chronic cardiac conditions which will need close follow-up with primary care and Cardiology. We are also recommending he follow-up with physical therapy regarding his back.    He will need to limit physical activity and take frequent breaks if he develops any discomfort until he is seen by Cardiology. If he develops discomfort that does not resolve he will need to return to the ED.     If you have any questions or concerns, please don't hesitate to call.      Krishna Montemayor MD

## 2025-07-03 NOTE — ED PROVIDER NOTES
"ED Provider Note  [facility]      History     Chief Complaint   Patient presents with    Back Pain     R sided mid back for 2 days, tried icy hot and ibuprofen, massage gun. Pain 10/10 sharp, pain increases with respirations.          Back Pain    Miguel Ángel Wilson is a 34 year old male with history of HFrEF (last TTE (04/20/2024) with LVEF 35-40%), DM2, uncontrolled hypertension, history of atrial flutter who presents to the ED due to back pain. Patient states he developed upper right back pain 2 days ago while at work that gradually has become worse. No history of heavy lifting, no recent injury or trauma to the back. Tried hot pads at home which only helped minimally. Patient notes the pain gets worse when walking around, better at rest. Also has intermittently felt sweaty and short of breath. Patient has been out of his home medications for several months due to insurance issue. No history of MI or angina. No nausea/vomiting, no recent illness, no fevers/chills.     {History Review Selection (Optional):769302}  {ROS Selection (Optional):608025}    Physical Exam   BP: (!) 180/131  Pulse: 94  Temp: 97.6  F (36.4  C)  Resp: 16  Height: 188 cm (6' 2\")  Weight: (!) 145.2 kg (320 lb)  SpO2: 98 %  Physical Exam  Constitutional:       Appearance: He is obese. He is diaphoretic.   Cardiovascular:      Rate and Rhythm: Normal rate and regular rhythm.   Pulmonary:      Effort: Pulmonary effort is normal.   Abdominal:      General: Abdomen is flat.      Palpations: Abdomen is soft.   Musculoskeletal:      Cervical back: Normal range of motion and neck supple.   Neurological:      General: No focal deficit present.      Mental Status: He is alert.   Psychiatric:         Mood and Affect: Mood normal.       ***    ED Course, Procedures, & Data      Procedures  POCUS is ordered, but not resulted ***   {ED Course Selections (Optional):582036}  {ED Sepsis CMS Documentation (Optional):657975::\" \"}  ED " Course:   1903: EKG obtained, no evidence of ischemia  1937: D dimer resulted mildly elevated 0.53, CT imaging ordered  2025: Bedside US with decreased LV wall motion and significantly decreased E-point septal separation  Imaging:   XR Chest: IMPRESSION: Stable cardiomegaly. Pulmonary vascular congestion with probable mild interstitial edema. No significant pleural fluid. No pneumothorax. No acute osseous abnormality.   POC US ECHO LIMITED (07/03/2025 8:02 PM)   CT Aortic survey w/ contrast: pending     Results for orders placed or performed during the hospital encounter of 07/03/25   XR Chest 2 Views    Impression    IMPRESSION: Stable cardiomegaly. Pulmonary vascular congestion with probable mild interstitial edema. No significant pleural fluid. No pneumothorax. No acute osseous abnormality.   Result Value Ref Range    Troponin T, High Sensitivity 49 (H) <=22 ng/L   Basic metabolic panel   Result Value Ref Range    Sodium 140 135 - 145 mmol/L    Potassium 4.2 3.4 - 5.3 mmol/L    Chloride 100 98 - 107 mmol/L    Carbon Dioxide (CO2) 25 22 - 29 mmol/L    Anion Gap 15 7 - 15 mmol/L    Urea Nitrogen 21.6 (H) 6.0 - 20.0 mg/dL    Creatinine 1.84 (H) 0.67 - 1.17 mg/dL    GFR Estimate 49 (L) >60 mL/min/1.73m2    Calcium 9.1 8.8 - 10.4 mg/dL    Glucose 103 (H) 70 - 99 mg/dL   INR   Result Value Ref Range    INR 1.07 0.85 - 1.15    PT 14.3 11.8 - 14.8 Seconds   Partial thromboplastin time   Result Value Ref Range    aPTT 34 22 - 38 Seconds   D dimer quantitative   Result Value Ref Range    D-Dimer Quantitative 0.53 (H) 0.00 - 0.50 ug/mL FEU   Result Value Ref Range    Magnesium 1.7 1.7 - 2.3 mg/dL   NT-proBNP   Result Value Ref Range    NT-proBNP 10,210 (H) 0 - 93 pg/mL   UA with Microscopic reflex to Culture    Specimen: Urine, Clean Catch   Result Value Ref Range    Color Urine Straw Colorless, Straw, Light Yellow, Yellow    Appearance Urine Clear Clear    Glucose Urine Negative Negative mg/dL    Bilirubin Urine Negative  Negative    Ketones Urine Negative Negative mg/dL    Specific Gravity Urine 1.007 1.003 - 1.035    Blood Urine Negative Negative    pH Urine 7.0 5.0 - 7.0    Protein Albumin Urine 30 (A) Negative mg/dL    Urobilinogen Urine Normal Normal mg/dL    Nitrite Urine Negative Negative    Leukocyte Esterase Urine Negative Negative    Mucus Urine Present (A) None Seen /LPF    RBC Urine 1 <=2 /HPF    WBC Urine <1 <=5 /HPF    Hyaline Casts Urine 3 (H) <=2 /LPF   CBC with platelets and differential   Result Value Ref Range    WBC Count 6.5 4.0 - 11.0 10e3/uL    RBC Count 4.63 4.40 - 5.90 10e6/uL    Hemoglobin 12.8 (L) 13.3 - 17.7 g/dL    Hematocrit 40.6 40.0 - 53.0 %    MCV 88 78 - 100 fL    MCH 27.6 26.5 - 33.0 pg    MCHC 31.5 31.5 - 36.5 g/dL    RDW 18.6 (H) 10.0 - 15.0 %    Platelet Count 153 150 - 450 10e3/uL    % Neutrophils 68 %    % Lymphocytes 21 %    % Monocytes 9 %    % Eosinophils 1 %    % Basophils 1 %    % Immature Granulocytes 0 %    NRBCs per 100 WBC 0 <1 /100    Absolute Neutrophils 4.4 1.6 - 8.3 10e3/uL    Absolute Lymphocytes 1.4 0.8 - 5.3 10e3/uL    Absolute Monocytes 0.6 0.0 - 1.3 10e3/uL    Absolute Eosinophils 0.1 0.0 - 0.7 10e3/uL    Absolute Basophils 0.0 0.0 - 0.2 10e3/uL    Absolute Immature Granulocytes 0.0 <=0.4 10e3/uL    Absolute NRBCs 0.0 10e3/uL     Medications   apixaban ANTICOAGULANT (ELIQUIS) tablet 5 mg ( Oral Automatically Held 7/6/25 2000)   carvedilol (COREG) tablet 25 mg (has no administration in time range)   lisinopril (ZESTRIL) tablet 40 mg (has no administration in time range)   spironolactone (ALDACTONE) tablet 100 mg (has no administration in time range)   iopamidol (ISOVUE-370) solution 500 mL (has no administration in time range)   sodium chloride 0.9 % bag for CT scan flush (has no administration in time range)   acetaminophen (TYLENOL) tablet 1,000 mg (1,000 mg Oral $Given 7/3/25 1922)   oxyCODONE (ROXICODONE) tablet 5 mg (5 mg Oral $Given 7/3/25 1922)   methocarbamol (ROBAXIN)  tablet 500 mg (500 mg Oral $Given 7/3/25 1922)   furosemide (LASIX) injection 80 mg (80 mg Intravenous $Given 7/3/25 2041)          {Critical Care Performed?:720328}    Assessment & Plan      Patient 34 year old male with history of HFrEF (last TTE (04/20/2024) LVEF 35-40%), DM2, uncontrolled hypertension, history of atrial flutter (previously on Eliquis but not taking) who presented to the ED due to back pain. Due to patient's cardiac history, not having access to home medications for several months, and quality of pain that worsens with exertion and improves with rest, initiated workup to rule out cardiac etiology of patient's pain. Reassuringly, EKG without evidence of ischemia. D-dimer elevated, CT-A ordered. ProBNP elevated, CXR with stable cardiomegaly and pulmonary vascular congestion. POCUS revealed decreased LV squeeze. Consistent with HFrEF exacerbation. Discussed findings with patient and recommended inpatient admission for diuresis and medication management. Patient unfortunately facing significant financial constraints and feels he cannot miss work tomorrow. Discussed increased risk for arrhythmia and possibility of complications including sudden MI and death. Patient expressed understanding of risks and would still like to go home this evening with outpatient follow up. He will return to the ED  if worsening symptoms. Spoke with cardiology, placed urgent referral. Will refill his home meds, holding previously prescribed Jardiance. He will  these medications tonight at 24 hour pharmacy.     I have reviewed the nursing notes. I have reviewed the findings, diagnosis, plan and need for follow up with the patient.    New Prescriptions    No medications on file       Final diagnoses:   Acute on chronic systolic congestive heart failure (H)   Acute midline thoracic back pain     Medical student involved: Megan Beh, MS4        --    ED Attending Physician Attestation    I Krishna Montemayor MD, cared for  this patient with the Medical Student. I performed, or re-performed, the physical exam and medical decision-making. I have verified the accuracy of all the medical student findings and documentation above, and have edited as necessary.    Summary of HPI, PE, ED Course   Patient is a 34 year old male evaluated in the emergency department for ***. Exam and ED course notable for ***. After the completion of care in the emergency department, the patient was discharged AMA.  I had an extensive conversation with the patient regarding the serious of his chronic disease and what appears to be progression due to his lost to follow-up after insurance lapse.  I warned the patient of the possibility of sudden cardiac death from spontaneous ventricular arrhythmias as well as the possibility of progression into severely decompensated heart failure, development of kidney failure, and numerous other risk factors should he not closely follow-up with the [facility] and cardiology in the next week.  I told the patient that by choosing to discharge he was promising me that should his symptoms worsen he would return to the emergency department as soon as possible and preferably present to the [facility] emergency department at [address] [place] [address] due to the availability of advanced cardiology services there.  The patient is appreciative of my concern and states he will do this.    I did speak with our pharmacist tonight regarding his prescription strengths.  He unfortunately has a history of stopping medication starting medications and leaving AGAINST MEDICAL ADVICE.  I think simply resuming him on his home scripts is the best action at this time.  He received doses of medication tonight and was monitored afterwards for short while to make sure he tolerated them.      Krishna Montemayor Jr., MD   [facility] EMERGENCY DEPARTMENT  7/3/2025   and medical decision-making. I have verified the accuracy of all the medical student findings and documentation above, and have edited as necessary.    Summary of HPI, PE, ED Course   Patient is a 34 year old male evaluated in the emergency department for back pain occurring while at work. Exam and ED course notable for symptoms concerning for angina, decompensated heart failure.  The patient has a troponin elevation that is likely from demand in the setting of heart failure exacerbation.  His EKG has numerous ST segment changes that appear unchanged from July of last year suggesting this is not representative of acute plaque rupture.  The patient is also not having progressive symptoms concerning for this.  Bedside ultrasound shows an ejection fraction that I believe is decreased from his prior poor ejection fraction of 35 to 40%.  The patient is maintaining good oxygen saturations but I wonder if he has been working harder to breathe especially with activity and could be having some intercostal accessory muscle spasm causing his back pain.  He received an aggressive evaluation tonight to attempt to identify imminently life-threatening causes including CT angiography of the chest which is negative.  We did try to obtain delta troponin to ensure a flat trend in this high-sensitivity troponin however the second sample hemolyzed and patient told me that he needed to leave the department.  I strongly recommended admission however due to the patient's financial situation he states he cannot miss work tomorrow.  After the completion of care in the emergency department, the patient was discharged AMA.  I had an extensive conversation with the patient regarding the serious of his chronic disease and what appears to be progression due to his lost to follow-up after insurance lapse.  I warned the patient of the possibility of sudden cardiac death from spontaneous ventricular arrhythmias as well as the possibility of  progression into severely decompensated heart failure, development of kidney failure, and numerous other risk factors should he not closely follow-up with the [facility] clinic and cardiology in the next week.  I told the patient that by choosing to discharge he was promising me that should his symptoms worsen he would return to the emergency department as soon as possible and preferably present to the [facility] emergency department at [address] [place] [address] due to the availability of advanced cardiology services there.  The patient is appreciative of my concern and states he will do this.    I did speak with our pharmacist tonight regarding his prescription strengths.  He unfortunately has a history of stopping medication starting medications and leaving AGAINST MEDICAL ADVICE.  I think simply resuming him on his home scripts is the best action at this time.  He received doses of medication tonight and was monitored afterwards for short while to make sure he tolerated them.    Krishna Montemayor Jr., MD   [facility] EMERGENCY DEPARTMENT  7/3/2025     Krishna Montemayor MD  07/04/25 0043

## 2025-07-03 NOTE — ED TRIAGE NOTES
R sided mid back for 2 days, tried icy hot and ibuprofen, massage gun. Pain 10/10 sharp, pain increases with respirations.      Triage Assessment (Adult)       Row Name 07/03/25 9686          Triage Assessment    Airway WDL WDL        Respiratory WDL    Respiratory WDL WDL        Skin Circulation/Temperature WDL    Skin Circulation/Temperature WDL WDL        Cardiac WDL    Cardiac WDL WDL        Peripheral/Neurovascular WDL    Peripheral Neurovascular WDL WDL        Cognitive/Neuro/Behavioral WDL    Cognitive/Neuro/Behavioral WDL WDL

## 2025-07-04 LAB
ATRIAL RATE - MUSE: 93 BPM
DIASTOLIC BLOOD PRESSURE - MUSE: NORMAL MMHG
INTERPRETATION ECG - MUSE: NORMAL
P AXIS - MUSE: 52 DEGREES
PR INTERVAL - MUSE: 188 MS
QRS DURATION - MUSE: 96 MS
QT - MUSE: 384 MS
QTC - MUSE: 477 MS
R AXIS - MUSE: 22 DEGREES
SYSTOLIC BLOOD PRESSURE - MUSE: NORMAL MMHG
T AXIS - MUSE: 190 DEGREES
VENTRICULAR RATE- MUSE: 93 BPM

## 2025-07-04 RX ORDER — METHOCARBAMOL 500 MG/1
500 TABLET, FILM COATED ORAL 4 TIMES DAILY PRN
Qty: 12 TABLET | Refills: 0 | Status: SHIPPED | OUTPATIENT
Start: 2025-07-04 | End: 2025-07-07

## 2025-07-04 RX ORDER — OXYCODONE HYDROCHLORIDE 5 MG/1
5 TABLET ORAL EVERY 6 HOURS PRN
Qty: 12 TABLET | Refills: 0 | Status: SHIPPED | OUTPATIENT
Start: 2025-07-04 | End: 2025-07-07

## 2025-07-04 RX ORDER — ACETAMINOPHEN 500 MG
1000 TABLET ORAL EVERY 6 HOURS PRN
Qty: 112 TABLET | Refills: 0 | Status: SHIPPED | OUTPATIENT
Start: 2025-07-04 | End: 2025-07-18

## 2025-07-04 NOTE — DISCHARGE INSTRUCTIONS
Thank you for coming to the [facility].  You were seen in the emergency department.  Your workup today does not suggest an immediately dangerous cause of your back pain however it did reveal significant cardiac disease.  We recommended admission for IV diuresis and evaluation by the cardiology service which you were unable to do due to financial concerns and need to work tomorrow.    We are prescribing 30-day supplies of your existing medication prescriptions.  We would like you to call your primary care team at the [facility] first thing Monday morning to schedule follow-up appointment.  You have been referred to the [facility] cardiology team as well and you should receive a call about scheduling follow-up in 1 to 2 days.    Before you follow-up with primary care and cardiology if you notice significantly worsening pain, worsening shortness of breath, any lightheadedness, near loss of consciousness, or syncopal episode/fainting you should immediately present to the emergency department.  If you return to the [facility] we strongly recommend you go to the [address] [place] [address] due to the availability of advanced cardiology services.

## 2025-07-07 ENCOUNTER — TELEPHONE (OUTPATIENT)
Dept: CARDIOLOGY | Facility: CLINIC | Age: 35
End: 2025-07-07

## 2025-07-07 NOTE — TELEPHONE ENCOUNTER
This encounter is being sent to inform the clinic that this patient has a referral from Krishna Montemayor MD  for the diagnoses of Acute on chronic systolic congestive heart failure (H) [I50.23]  and has requested that this patient be seen within 3-5 days and/or with HF.  Based on the availability of our provider(s), we are unable to accommodate this request.    Were all sites offered this patient?  Yes    Does scheduling algorithm request to schedule next available?  Patient appointment has not been scheduled.  Please review the referral request for accommodation and contact the patient.  If unable to accommodate, please resubmit a referral and indicate a preferred partner or affiliate location using Provider Finder or Scheduling Instructions field.     NEW HF and patient needs the above appointment. Please assist patient in 3-5 days. Thanks

## 2025-07-11 NOTE — TELEPHONE ENCOUNTER
7/8/25 - Called pt/LVM  7/11/25 - Called/spoke with patient.  He does not have documentation of insurance coverage in chart.  He asked us to check to see if his M[place]ure ([employer]) is active.  I asked if he had a card with ID#, but he reported he does not have one.     *Found in care everywhere a [facility] Medicaid number [id number].  Will check to see if Registration and enter it to see if it comes up active.    7/14/25 - Called Registration.  They entered his Medicaid # but it came up that he was not covered.  Called patient to let him know and gave him the phone number to call [facility] [employer] ([phone]) to find out how to renew it. We will call him back in a few days to see if he was able to reach them.      7/16/25 - Spoke with patient.  He did not call Medicaid to find out if and why his Medicaid is not active.  He only stated he thought it was ok.  Repeated that it does not come up as active when checked in our computer.  He said he would call them.    7/17/25 - Called pt/LVM with phone number for [facility] Medicaid number to call to renew his Medicaid.  In addition, with the type of plan he has, he may need to be seen at his [facility] clinic.

## 2025-08-09 ENCOUNTER — HEALTH MAINTENANCE LETTER (OUTPATIENT)
Age: 35
End: 2025-08-09

## (undated) RX ORDER — LIDOCAINE HYDROCHLORIDE 10 MG/ML
INJECTION, SOLUTION EPIDURAL; INFILTRATION; INTRACAUDAL; PERINEURAL
Status: DISPENSED
Start: 2023-04-24

## (undated) RX ORDER — LIDOCAINE HYDROCHLORIDE 10 MG/ML
INJECTION, SOLUTION EPIDURAL; INFILTRATION; INTRACAUDAL; PERINEURAL
Status: DISPENSED
Start: 2023-04-18

## (undated) RX ORDER — HEPARIN SODIUM 200 [USP'U]/100ML
INJECTION, SOLUTION INTRAVENOUS
Status: DISPENSED
Start: 2023-04-18

## (undated) RX ORDER — FENTANYL CITRATE 50 UG/ML
INJECTION, SOLUTION INTRAMUSCULAR; INTRAVENOUS
Status: DISPENSED
Start: 2023-04-18

## (undated) RX ORDER — CEFAZOLIN SODIUM/WATER 3 G/30 ML
SYRINGE (ML) INTRAVENOUS
Status: DISPENSED
Start: 2023-04-24

## (undated) RX ORDER — FENTANYL CITRATE 50 UG/ML
INJECTION, SOLUTION INTRAMUSCULAR; INTRAVENOUS
Status: DISPENSED
Start: 2023-04-24

## (undated) RX ORDER — PROPOFOL 10 MG/ML
INJECTION, EMULSION INTRAVENOUS
Status: DISPENSED
Start: 2023-04-24